# Patient Record
Sex: FEMALE | Race: WHITE | NOT HISPANIC OR LATINO
[De-identification: names, ages, dates, MRNs, and addresses within clinical notes are randomized per-mention and may not be internally consistent; named-entity substitution may affect disease eponyms.]

---

## 2019-04-26 ENCOUNTER — RECORD ABSTRACTING (OUTPATIENT)
Age: 69
End: 2019-04-26

## 2019-04-26 DIAGNOSIS — Z87.820 PERSONAL HISTORY OF TRAUMATIC BRAIN INJURY: ICD-10-CM

## 2019-05-06 ENCOUNTER — APPOINTMENT (OUTPATIENT)
Dept: FAMILY MEDICINE | Facility: CLINIC | Age: 69
End: 2019-05-06
Payer: MEDICARE

## 2019-05-06 VITALS
DIASTOLIC BLOOD PRESSURE: 64 MMHG | SYSTOLIC BLOOD PRESSURE: 122 MMHG | HEART RATE: 94 BPM | HEIGHT: 65 IN | WEIGHT: 135 LBS | RESPIRATION RATE: 14 BRPM | OXYGEN SATURATION: 97 % | BODY MASS INDEX: 22.49 KG/M2

## 2019-05-06 DIAGNOSIS — Z80.42 FAMILY HISTORY OF MALIGNANT NEOPLASM OF PROSTATE: ICD-10-CM

## 2019-05-06 DIAGNOSIS — K44.9 DIAPHRAGMATIC HERNIA W/OUT OBSTRUCTION OR GANGRENE: ICD-10-CM

## 2019-05-06 DIAGNOSIS — K83.5 BILIARY CYST: ICD-10-CM

## 2019-05-06 DIAGNOSIS — S06.0X9S CONCUSSION WITH LOSS OF CONSCIOUSNESS OF UNSPECIFIED DURATION, SEQUELA: ICD-10-CM

## 2019-05-06 DIAGNOSIS — K21.9 GASTRO-ESOPHAGEAL REFLUX DISEASE W/OUT ESOPHAGITIS: ICD-10-CM

## 2019-05-06 LAB — CYTOLOGY CVX/VAG DOC THIN PREP: NORMAL

## 2019-05-06 PROCEDURE — 36415 COLL VENOUS BLD VENIPUNCTURE: CPT

## 2019-05-06 PROCEDURE — 99214 OFFICE O/P EST MOD 30 MIN: CPT | Mod: 25

## 2019-05-06 NOTE — HEALTH RISK ASSESSMENT
[Intercurrent hospitalizations] : was admitted to the hospital  [Any fall with injury in past year] : Patient reported fall with injury in the past year [] : No [de-identified] : TBI [facility] Alize JONES [de-identified] : neurology [de-identified] : no [de-identified] : no [de-identified] : 29/24/018

## 2019-05-06 NOTE — PHYSICAL EXAM
[Well Nourished] : well nourished [Well Developed] : well developed [Normal Sclera/Conjunctiva] : normal sclera/conjunctiva [PERRL] : pupils equal round and reactive to light [EOMI] : extraocular movements intact [Normal Outer Ear/Nose] : the outer ears and nose were normal in appearance [Normal TMs] : both tympanic membranes were normal [Normal Oropharynx] : the oropharynx was normal [No JVD] : no jugular venous distention [No Lymphadenopathy] : no lymphadenopathy [Supple] : supple [Regular Rhythm] : with a regular rhythm [Normal Rate] : normal rate  [Clear to Auscultation] : lungs were clear to auscultation bilaterally [Normal S1, S2] : normal S1 and S2 [de-identified] : poor balance, fatigue

## 2019-05-06 NOTE — HISTORY OF PRESENT ILLNESS
[FreeTextEntry1] : Pt presents for follow up, has not been seen since 2016 [de-identified] : 70 yo wf here for follow up. She moved back to [place] with her son. \par \par I was in icu in  september and I got pulled by my dog and i just hit the ground and  i don’t remember much but there were witnesses. I had TBI. in [place].  I had MRI and CT and she had bleeding on brain and transferred her to [facility] in [facility] They brought in a neurology and said I had MS. They kept saying I had seizures. THey put me on seizure meds and they made me hallucinate. They released me and told me to follow up with neurology. i had blurred vision I could hardly walk and was so off balance. I hit my left side and the right side of my head hurt so much. By tristan I felt better but not 100% .  I get up in the am and have energy and by 11 I could sleep for hours . Also my magnesium was non existing so he put me on magnesium 500 mg every night. I stopped it 3 weeks ago. I got back to [place] 3 weeks ago. Headaches are better but still there. I used to shake so bad but i am better. My balance is still off.

## 2019-05-07 ENCOUNTER — TRANSCRIPTION ENCOUNTER (OUTPATIENT)
Age: 69
End: 2019-05-07

## 2019-05-07 LAB
ALBUMIN SERPL ELPH-MCNC: 4.3 G/DL
ALP BLD-CCNC: 129 U/L
ALT SERPL-CCNC: 17 U/L
ANION GAP SERPL CALC-SCNC: 12 MMOL/L
AST SERPL-CCNC: 22 U/L
BASOPHILS # BLD AUTO: 0.06 K/UL
BASOPHILS NFR BLD AUTO: 0.7 %
BILIRUB SERPL-MCNC: 0.3 MG/DL
BUN SERPL-MCNC: 14 MG/DL
CALCIUM SERPL-MCNC: 9.9 MG/DL
CHLORIDE SERPL-SCNC: 102 MMOL/L
CO2 SERPL-SCNC: 29 MMOL/L
CREAT SERPL-MCNC: 0.75 MG/DL
EOSINOPHIL # BLD AUTO: 0.25 K/UL
EOSINOPHIL NFR BLD AUTO: 2.8 %
ESTIMATED AVERAGE GLUCOSE: 126 MG/DL
GLUCOSE SERPL-MCNC: 111 MG/DL
HBA1C MFR BLD HPLC: 6 %
HCT VFR BLD CALC: 44.7 %
HGB BLD-MCNC: 13.9 G/DL
IMM GRANULOCYTES NFR BLD AUTO: 0.2 %
LYMPHOCYTES # BLD AUTO: 2.65 K/UL
LYMPHOCYTES NFR BLD AUTO: 29.9 %
MAGNESIUM SERPL-MCNC: 1.5 MG/DL
MAN DIFF?: NORMAL
MCHC RBC-ENTMCNC: 30.2 PG
MCHC RBC-ENTMCNC: 31.1 GM/DL
MCV RBC AUTO: 97.2 FL
MONOCYTES # BLD AUTO: 0.64 K/UL
MONOCYTES NFR BLD AUTO: 7.2 %
NEUTROPHILS # BLD AUTO: 5.24 K/UL
NEUTROPHILS NFR BLD AUTO: 59.2 %
PLATELET # BLD AUTO: 262 K/UL
POTASSIUM SERPL-SCNC: 4 MMOL/L
PROT SERPL-MCNC: 6.7 G/DL
RBC # BLD: 4.6 M/UL
RBC # FLD: 13.1 %
SODIUM SERPL-SCNC: 143 MMOL/L
TSH SERPL-ACNC: 2.65 UIU/ML
WBC # FLD AUTO: 8.86 K/UL

## 2019-06-24 DIAGNOSIS — R69 ILLNESS, UNSPECIFIED: ICD-10-CM

## 2019-08-14 ENCOUNTER — RX RENEWAL (OUTPATIENT)
Age: 69
End: 2019-08-14

## 2019-11-05 ENCOUNTER — RX RENEWAL (OUTPATIENT)
Age: 69
End: 2019-11-05

## 2019-11-11 ENCOUNTER — APPOINTMENT (OUTPATIENT)
Dept: FAMILY MEDICINE | Facility: CLINIC | Age: 69
End: 2019-11-11
Payer: MEDICARE

## 2019-11-11 VITALS
BODY MASS INDEX: 20.99 KG/M2 | OXYGEN SATURATION: 97 % | SYSTOLIC BLOOD PRESSURE: 112 MMHG | HEART RATE: 95 BPM | WEIGHT: 126 LBS | DIASTOLIC BLOOD PRESSURE: 70 MMHG | HEIGHT: 65 IN | RESPIRATION RATE: 14 BRPM

## 2019-11-11 DIAGNOSIS — Z12.39 ENCOUNTER FOR OTHER SCREENING FOR MALIGNANT NEOPLASM OF BREAST: ICD-10-CM

## 2019-11-11 PROCEDURE — 36415 COLL VENOUS BLD VENIPUNCTURE: CPT

## 2019-11-11 PROCEDURE — 99214 OFFICE O/P EST MOD 30 MIN: CPT | Mod: 25

## 2019-11-11 NOTE — REVIEW OF SYSTEMS
[Fatigue] : fatigue [Negative] : Respiratory [Recent Change In Weight] : ~T recent weight change [Headache] : headache [Anxiety] : anxiety [FreeTextEntry2] : lost 9 ounds

## 2019-11-11 NOTE — PHYSICAL EXAM
[Well Developed] : well developed [PERRL] : pupils equal round and reactive to light [EOMI] : extraocular movements intact [Normal Sclera/Conjunctiva] : normal sclera/conjunctiva [Normal Oropharynx] : the oropharynx was normal [No JVD] : no jugular venous distention [Normal TMs] : both tympanic membranes were normal [Supple] : supple [Clear to Auscultation] : lungs were clear to auscultation bilaterally [No Lymphadenopathy] : no lymphadenopathy [Regular Rhythm] : with a regular rhythm [Normal Rate] : normal rate  [Normal S1, S2] : normal S1 and S2 [Ill-Appearing] : ill-appearing [de-identified] : migraine facies [de-identified] :  nasal passages erythema and purulent discharge, frontal sinuses are tender bilaterally. postnasal drip. [de-identified] : poor balance, fatigue

## 2019-11-11 NOTE — HISTORY OF PRESENT ILLNESS
[FreeTextEntry1] : Pt presents for follow up,   [de-identified] : 70 yo wf here for follow up. \par Saturday and sunday I could hardly get up my head hurt so bad and I was nauseas too. 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 I am in such a situation that is financially strapped that I will have to move by January I will run out of money. My kids cant help me. i have to nowhere to go. I called the town. They said to call the housing and resource service. They gave me 8 apartment to call. I am so nervous I cant eat I cant sleep. \par \par \par On previous visit she She moved back to [place] with her son. \par \par I was in icu in  september and I got pulled by my dog and i just hit the ground and  i don’t remember much but there were witnesses. I had TBI. in [place].  I had MRI and CT and she had bleeding on brain and transferred [facility] in [facility] They brought in a neurology and said I had MS. They kept saying I had seizures. THey put me on seizure meds and they made me hallucinate. They released me and told me to follow up with neurology. i had blurred vision I could hardly walk and was so off balance. I hit my left side and the right side of my head hurt so much. By tristan I felt better but not 100% .  I get up in the am and have energy and by 11 I could sleep for hours . Also my magnesium was non existing so he put me on magnesium 500 mg every night. I stopped it 3 weeks ago. I got back to [place] 3 weeks ago. Headaches are better but still there. I used to shake so bad but i am better. My balance is still off. \par \par \par she never saw neurology. I had to put my mother in a senior place. I have a problem with my skilled nursing. I had only 1/2 the amount i was expected.

## 2019-11-11 NOTE — HEALTH RISK ASSESSMENT
[Intercurrent hospitalizations] : was admitted to the hospital  [No] : No [Any fall with injury in past year] : Patient reported fall with injury in the past year [] : No [de-identified] : TBI [facility] Alize JONES [de-identified] : no [Audit-CScore] : 0 [de-identified] : neurology [de-identified] : no [de-identified] : 29/24/018

## 2019-11-11 NOTE — ASSESSMENT
[FreeTextEntry1] : Labs drawn/ specimens obtained  in office on this date of service  for evaluation of   assessed conditions -   cbc magnesium    to be run at [facility].\par  \par Take antibiotics,  rest,  increase fluids, wash hands to prevent the spread of  infection . Take mucinex   over the counter. Take tylenol or advil for fever or body aches. Follow up if no better or worse respiratory symptoms.\par \par  will refer to [facility] - case mgmt \par \par We spent sufficient time to discuss aspects of care; questions were answered  to patient's satisfaction.The diagnosis and care plan were discussed with patient in detail.  Patient test results were  reviewed and explained in full. All questions and concerns  were answered to the best of my knowledge.\par

## 2019-11-12 LAB
ALBUMIN SERPL ELPH-MCNC: 4.4 G/DL
ALP BLD-CCNC: 131 U/L
ALT SERPL-CCNC: 16 U/L
ANION GAP SERPL CALC-SCNC: 16 MMOL/L
AST SERPL-CCNC: 21 U/L
BASOPHILS # BLD AUTO: 0.03 K/UL
BASOPHILS NFR BLD AUTO: 0.3 %
BILIRUB SERPL-MCNC: 0.4 MG/DL
BUN SERPL-MCNC: 8 MG/DL
CALCIUM SERPL-MCNC: 10.2 MG/DL
CHLORIDE SERPL-SCNC: 101 MMOL/L
CO2 SERPL-SCNC: 27 MMOL/L
CREAT SERPL-MCNC: 0.74 MG/DL
EOSINOPHIL # BLD AUTO: 0.09 K/UL
EOSINOPHIL NFR BLD AUTO: 1 %
ESTIMATED AVERAGE GLUCOSE: 117 MG/DL
GLUCOSE SERPL-MCNC: 107 MG/DL
HBA1C MFR BLD HPLC: 5.7 %
HCT VFR BLD CALC: 44.8 %
HGB BLD-MCNC: 14.2 G/DL
IMM GRANULOCYTES NFR BLD AUTO: 0.5 %
LYMPHOCYTES # BLD AUTO: 2.5 K/UL
LYMPHOCYTES NFR BLD AUTO: 28.3 %
MAGNESIUM SERPL-MCNC: 1.6 MG/DL
MAN DIFF?: NORMAL
MCHC RBC-ENTMCNC: 30.1 PG
MCHC RBC-ENTMCNC: 31.7 GM/DL
MCV RBC AUTO: 95.1 FL
MONOCYTES # BLD AUTO: 0.62 K/UL
MONOCYTES NFR BLD AUTO: 7 %
NEUTROPHILS # BLD AUTO: 5.54 K/UL
NEUTROPHILS NFR BLD AUTO: 62.9 %
PLATELET # BLD AUTO: 279 K/UL
POTASSIUM SERPL-SCNC: 3.6 MMOL/L
PROT SERPL-MCNC: 6.8 G/DL
RBC # BLD: 4.71 M/UL
RBC # FLD: 12.9 %
SODIUM SERPL-SCNC: 144 MMOL/L
WBC # FLD AUTO: 8.82 K/UL

## 2019-12-23 ENCOUNTER — APPOINTMENT (OUTPATIENT)
Dept: FAMILY MEDICINE | Facility: CLINIC | Age: 69
End: 2019-12-23
Payer: MEDICARE

## 2019-12-23 VITALS
HEIGHT: 65 IN | HEART RATE: 105 BPM | TEMPERATURE: 98.8 F | DIASTOLIC BLOOD PRESSURE: 80 MMHG | SYSTOLIC BLOOD PRESSURE: 132 MMHG | OXYGEN SATURATION: 95 % | WEIGHT: 126 LBS | RESPIRATION RATE: 14 BRPM | BODY MASS INDEX: 20.99 KG/M2

## 2019-12-23 PROCEDURE — 99213 OFFICE O/P EST LOW 20 MIN: CPT

## 2019-12-23 RX ORDER — NEBULIZER ACCESSORIES
KIT MISCELLANEOUS
Qty: 1 | Refills: 0 | Status: ACTIVE | OUTPATIENT
Start: 2019-12-23

## 2019-12-23 NOTE — HEALTH RISK ASSESSMENT
[No] : In the past 12 months have you used drugs other than those required for medical reasons? No [No falls in past year] : Patient reported no falls in the past year [de-identified] : no [] : No [de-identified] : no

## 2019-12-23 NOTE — HEALTH RISK ASSESSMENT
[No] : In the past 12 months have you used drugs other than those required for medical reasons? No [No falls in past year] : Patient reported no falls in the past year [] : No [de-identified] : no [de-identified] : no

## 2019-12-23 NOTE — PHYSICAL EXAM
[Ill-Appearing] : ill-appearing [Normal] : no jugular venous distention, supple, no lymphadenopathy and the thyroid was normal and there were no nodules present [Barky Cough] : a barky cough was heard [Scattered Wheezes] : scattered wheezing was heard [Rales / Crackles Left] : no rales or crackles were heard over the left lung [Rhonchi Bilateral] : rhonchi were heard diffusely over both lungs [Tachycardia] : tachycardic [Normal S1] : normal S1 [Normal S2] : normal S2 [de-identified] : coughing incessantly

## 2019-12-23 NOTE — REVIEW OF SYSTEMS
[Chills] : chills [Fatigue] : fatigue [Shortness Of Breath] : shortness of breath [Palpitations] : palpitations [Wheezing] : wheezing [Cough] : cough [Negative] : ENT

## 2019-12-23 NOTE — ASSESSMENT
[FreeTextEntry1] : REFER PATIENT TO ER  FOR FURTHER EVALUATION RO PNEUMONIA, HYPOXIA, BACK PAIN, SHORTNESS OF BREATH.\par PATIENT AGREES WITH PLAN

## 2019-12-23 NOTE — PHYSICAL EXAM
[Ill-Appearing] : ill-appearing [Normal] : no jugular venous distention, supple, no lymphadenopathy and the thyroid was normal and there were no nodules present [Barky Cough] : a barky cough was heard [Scattered Wheezes] : scattered wheezing was heard [Rales / Crackles Left] : no rales or crackles were heard over the left lung [Rhonchi Bilateral] : rhonchi were heard diffusely over both lungs [Tachycardia] : tachycardic [Normal S1] : normal S1 [Normal S2] : normal S2 [de-identified] : coughing incessantly

## 2019-12-23 NOTE — HISTORY OF PRESENT ILLNESS
[FreeTextEntry8] : pt c/o -ST, +Cough with green phlegm, +HA, +ear pain, +sinus pressure, +congestion, +temp, +SOB, +wheezing  X since thursday,.  My heart is beating fast for 2 days. I have back pain in the right upper yesterday. I cant breath I cant catch your breath\par

## 2019-12-26 ENCOUNTER — MEDICATION RENEWAL (OUTPATIENT)
Age: 69
End: 2019-12-26

## 2019-12-26 RX ORDER — LEVALBUTEROL HYDROCHLORIDE 0.63 MG/3ML
0.63 SOLUTION RESPIRATORY (INHALATION)
Refills: 0 | Status: COMPLETED | OUTPATIENT
Start: 2019-12-26

## 2019-12-26 RX ORDER — SOFT LENS DISINFECTANT
SOLUTION, NON-ORAL MISCELLANEOUS
Qty: 1 | Refills: 0 | Status: ACTIVE | COMMUNITY
Start: 2019-12-23 | End: 1900-01-01

## 2019-12-27 ENCOUNTER — MEDICATION RENEWAL (OUTPATIENT)
Age: 69
End: 2019-12-27

## 2020-01-02 ENCOUNTER — RX RENEWAL (OUTPATIENT)
Age: 70
End: 2020-01-02

## 2020-01-31 ENCOUNTER — RX RENEWAL (OUTPATIENT)
Age: 70
End: 2020-01-31

## 2020-04-11 ENCOUNTER — APPOINTMENT (OUTPATIENT)
Dept: FAMILY MEDICINE | Facility: CLINIC | Age: 70
End: 2020-04-11
Payer: MEDICARE

## 2020-04-11 PROCEDURE — G2012 BRIEF CHECK IN BY MD/QHP: CPT

## 2020-04-13 ENCOUNTER — RX RENEWAL (OUTPATIENT)
Age: 70
End: 2020-04-13

## 2020-04-13 RX ORDER — METHYLPREDNISOLONE 4 MG/1
4 TABLET ORAL
Qty: 21 | Refills: 0 | Status: COMPLETED | COMMUNITY
Start: 2019-11-11 | End: 2020-04-13

## 2020-04-24 ENCOUNTER — NON-APPOINTMENT (OUTPATIENT)
Age: 70
End: 2020-04-24

## 2020-04-24 ENCOUNTER — RX RENEWAL (OUTPATIENT)
Age: 70
End: 2020-04-24

## 2020-04-24 ENCOUNTER — APPOINTMENT (OUTPATIENT)
Dept: FAMILY MEDICINE | Facility: CLINIC | Age: 70
End: 2020-04-24
Payer: MEDICARE

## 2020-04-24 ENCOUNTER — TRANSCRIPTION ENCOUNTER (OUTPATIENT)
Age: 70
End: 2020-04-24

## 2020-04-24 VITALS — HEIGHT: 65 IN | SYSTOLIC BLOOD PRESSURE: 160 MMHG | DIASTOLIC BLOOD PRESSURE: 100 MMHG | RESPIRATION RATE: 14 BRPM

## 2020-04-24 VITALS
DIASTOLIC BLOOD PRESSURE: 90 MMHG | HEART RATE: 90 BPM | SYSTOLIC BLOOD PRESSURE: 160 MMHG | OXYGEN SATURATION: 97 % | RESPIRATION RATE: 20 BRPM

## 2020-04-24 DIAGNOSIS — J18.0 BRONCHOPNEUMONIA, UNSPECIFIED ORGANISM: ICD-10-CM

## 2020-04-24 PROCEDURE — 99214 OFFICE O/P EST MOD 30 MIN: CPT

## 2020-04-24 RX ORDER — OMEPRAZOLE 20 MG/1
20 CAPSULE, DELAYED RELEASE ORAL
Qty: 90 | Refills: 0 | Status: COMPLETED | COMMUNITY
Start: 2019-05-06

## 2020-04-24 RX ORDER — ALBUTEROL SULFATE 90 UG/1
108 (90 BASE) AEROSOL, METERED RESPIRATORY (INHALATION)
Qty: 18 | Refills: 0 | Status: COMPLETED | COMMUNITY
Start: 2019-12-23

## 2020-04-24 RX ORDER — PREDNISONE 20 MG/1
20 TABLET ORAL
Qty: 8 | Refills: 0 | Status: COMPLETED | COMMUNITY
Start: 2019-12-23

## 2020-04-24 NOTE — PHYSICAL EXAM
[Tremulous] : was tremulous [Normal] : no respiratory distress, lungs were clear to auscultation bilaterally and no accessory muscle use [Anxious] : anxious

## 2020-04-24 NOTE — HISTORY OF PRESENT ILLNESS
[FreeTextEntry8] : patient c/o high blood pressure\par \par I am renting  a  basement  apartment of a home . I have been there for three weeks. Since I was there it has been so noisy. The kids are noisy and the furniture is dragging on the floor. The kids ride their bikes in the house. I talked to the landlord and she said she would try to be more quiet . But it was not any better. I get up in the am and leave and sit in my car all day and then go in and the noise is unbearable. They wont let me end my lease. My bp is fine in the car. When I go in the house it goes up because I cant take the noise level. I cant even eat because of the noise.  SHe takes lisinopril 20 mg qd but then takes another when her bp is high and she will take xanax occasssionally\par I have another place set up . I am moving out.  I have to . I get such high blood pressure. I want to get checked. she denies cp . she recovered from the bronchitis in [facility] by mid January.\par

## 2020-04-24 NOTE — ASSESSMENT
[FreeTextEntry1] : EKG performed on this day in the office and reviewed by PHI Glover. \par follow up with cardiology for abnormal ekg. \par  take lisinopril 20 mg bid and add metoprolol er 50 mg qd for anxiety and bp effect. ( she did not tolerate norvasc in the past) \par Activity as tolerated\par Go to ER if worse chest pain or shortness of breath.\par  As per usual protocol the patient was advised in regards to the risks of driving when on medications with side effects of dizziness or drowsiness. Patient has been assessed  for increase risk for respiratory depression. Patient denies suicidal ideations or plan.  Istop checked.\par

## 2020-05-07 ENCOUNTER — APPOINTMENT (OUTPATIENT)
Dept: CARDIOLOGY | Facility: CLINIC | Age: 70
End: 2020-05-07
Payer: MEDICARE

## 2020-05-07 VITALS
HEART RATE: 94 BPM | WEIGHT: 129 LBS | DIASTOLIC BLOOD PRESSURE: 96 MMHG | OXYGEN SATURATION: 99 % | SYSTOLIC BLOOD PRESSURE: 150 MMHG | HEIGHT: 65 IN | BODY MASS INDEX: 21.49 KG/M2

## 2020-05-07 DIAGNOSIS — Z87.891 PERSONAL HISTORY OF NICOTINE DEPENDENCE: ICD-10-CM

## 2020-05-07 DIAGNOSIS — R79.0 ABNORMAL LVL OF BLOOD MINERAL: ICD-10-CM

## 2020-05-07 DIAGNOSIS — Z78.9 OTHER SPECIFIED HEALTH STATUS: ICD-10-CM

## 2020-05-07 PROCEDURE — 99204 OFFICE O/P NEW MOD 45 MIN: CPT

## 2020-05-07 NOTE — REVIEW OF SYSTEMS
[Shortness Of Breath] : shortness of breath [Dyspnea on exertion] : dyspnea during exertion [Chest Pain] : no chest pain [Palpitations] : no palpitations [Lower Ext Edema] : no extremity edema [Cough] : no cough [Wheezing] : no wheezing [Joint Pain] : joint pain [Joint Stiffness] : joint stiffness [Negative] : Heme/Lymph

## 2020-05-07 NOTE — PHYSICAL EXAM
[General Appearance - Well Developed] : well developed [Normal Appearance] : normal appearance [Well Groomed] : well groomed [General Appearance - Well Nourished] : well nourished [General Appearance - In No Acute Distress] : no acute distress [Eyelids - No Xanthelasma] : the eyelids demonstrated no xanthelasmas [Normal Conjunctiva] : the conjunctiva exhibited no abnormalities [Normal Oral Mucosa] : normal oral mucosa [No Oral Pallor] : no oral pallor [No Oral Cyanosis] : no oral cyanosis [Heart Rate And Rhythm] : heart rate and rhythm were normal [Edema] : no peripheral edema present [Heart Sounds] : normal S1 and S2 [FreeTextEntry1] : 1/6 systolic ejection murmur RUSB [Respiration, Rhythm And Depth] : normal respiratory rhythm and effort [Exaggerated Use Of Accessory Muscles For Inspiration] : no accessory muscle use [Auscultation Breath Sounds / Voice Sounds] : lungs were clear to auscultation bilaterally [Abnormal Walk] : normal gait [Gait - Sufficient For Exercise Testing] : the gait was sufficient for exercise testing [Cyanosis, Localized] : no localized cyanosis [Nail Clubbing] : no clubbing of the fingernails [Skin Color & Pigmentation] : normal skin color and pigmentation [Impaired Insight] : insight and judgment were intact [Skin Turgor] : normal skin turgor [] : no rash [Affect] : the affect was normal [Memory Recent] : recent memory was not impaired

## 2020-05-07 NOTE — DISCUSSION/SUMMARY
[FreeTextEntry1] : 1. Dyspnea: patient with risk factors CAD, which include uncontrolled HTN, HLD, post menopausal. My recommendation is to perform pharmacologic nuclear stress testing and echocardiogram. \par \par 2. Uncontrolled HTN: Recommend d/c of Toprol XL 50mg daily. I am recommending to start HCTZ 25mg daily and convert her Lisinopril 20mg BID to 40mg daily for better medication compliance. 1 week after starting HCTZ I recommend she get a BMP and magnesium level done. Patient has a history of hypomagnesemia, probably from long term use of omeprazole. I gave her magnesium oxide 400mg daily. If low magnesium, potassium, or sodium become an issue on HCTZ, I will switch to another agent. Goal BP is 130/80 or less. Recommend low salt diet. \par \par 3. Hyperlipidemia: recommend continuing atorvastatin 20mg daily. \par \par Follow up after testing to discuss result and follow up on her BP.

## 2020-05-07 NOTE — HISTORY OF PRESENT ILLNESS
[FreeTextEntry1] : 70 year old female with history of benign lung tumor s/p resection many years ago, HTN, HLD presents for evaluation of her hypertension and dyspnea. Patient states that her dyspnea has been present for 1 year. Over the past 1-2 months it is worsening in severity. Patient gets moderate SOB with ambulating up 1 flight of stairs. She has no associated chest pain or palpitations. As far as her HTN is concerned she had her Lisinopril increased from 20mg daily to BID dosing by her PCP about two weeks ago. She was also placed on Toprol XL 50mg daily. She states she was living in a basement apartment and it was causing her stress because the children who lived upstairs were extremely noisy. She moved out of the apartment and her BP has came down a little but on her home monitor she still gets BPs in the 140s-150s systolic.

## 2020-05-10 ENCOUNTER — NON-APPOINTMENT (OUTPATIENT)
Age: 70
End: 2020-05-10

## 2020-06-18 ENCOUNTER — APPOINTMENT (OUTPATIENT)
Dept: CARDIOLOGY | Facility: CLINIC | Age: 70
End: 2020-06-18

## 2020-06-24 ENCOUNTER — APPOINTMENT (OUTPATIENT)
Dept: CARDIOLOGY | Facility: CLINIC | Age: 70
End: 2020-06-24

## 2020-08-08 ENCOUNTER — RX RENEWAL (OUTPATIENT)
Age: 70
End: 2020-08-08

## 2020-08-19 ENCOUNTER — APPOINTMENT (OUTPATIENT)
Dept: FAMILY MEDICINE | Facility: CLINIC | Age: 70
End: 2020-08-19
Payer: MEDICARE

## 2020-08-19 VITALS — TEMPERATURE: 97.7 F

## 2020-08-19 VITALS
HEIGHT: 65 IN | HEART RATE: 94 BPM | DIASTOLIC BLOOD PRESSURE: 70 MMHG | TEMPERATURE: 97.7 F | SYSTOLIC BLOOD PRESSURE: 110 MMHG | WEIGHT: 114 LBS | BODY MASS INDEX: 18.99 KG/M2 | RESPIRATION RATE: 14 BRPM | OXYGEN SATURATION: 98 %

## 2020-08-19 PROCEDURE — 99495 TRANSJ CARE MGMT MOD F2F 14D: CPT | Mod: 25

## 2020-08-19 PROCEDURE — 36415 COLL VENOUS BLD VENIPUNCTURE: CPT

## 2020-08-19 RX ORDER — DULOXETINE HYDROCHLORIDE 30 MG/1
30 CAPSULE, DELAYED RELEASE PELLETS ORAL DAILY
Qty: 270 | Refills: 0 | Status: DISCONTINUED | COMMUNITY
Start: 2019-08-14 | End: 2020-08-19

## 2020-08-19 RX ORDER — HYDROCHLOROTHIAZIDE 25 MG/1
25 TABLET ORAL DAILY
Qty: 90 | Refills: 3 | Status: DISCONTINUED | COMMUNITY
Start: 2020-05-07 | End: 2020-08-19

## 2020-08-19 RX ORDER — ALPRAZOLAM 0.25 MG/1
0.25 TABLET ORAL
Qty: 60 | Refills: 0 | Status: DISCONTINUED | COMMUNITY
Start: 2019-11-11 | End: 2020-08-19

## 2020-08-19 RX ORDER — LISINOPRIL 20 MG/1
20 TABLET ORAL
Refills: 0 | Status: DISCONTINUED | COMMUNITY
Start: 1900-01-01 | End: 2020-08-19

## 2020-08-19 RX ORDER — LISINOPRIL 40 MG/1
40 TABLET ORAL DAILY
Qty: 90 | Refills: 3 | Status: DISCONTINUED | COMMUNITY
Start: 2020-05-07 | End: 2020-08-19

## 2020-08-19 NOTE — HEALTH RISK ASSESSMENT
[No] : In the past 12 months have you used drugs other than those required for medical reasons? No [Patient reported mammogram was normal] : Patient reported mammogram was normal [Patient declined colonoscopy] : Patient declined colonoscopy [HIV test declined] : HIV test declined [Hepatitis C test declined] : Hepatitis C test declined [Financial] : financial [Alone] : lives alone [Retired] : retired [High School] : high school [Single] : single [Fully functional (bathing, dressing, toileting, transferring, walking, feeding)] : Fully functional (bathing, dressing, toileting, transferring, walking, feeding) [Fully functional (using the telephone, shopping, preparing meals, housekeeping, doing laundry, using] : Fully functional and needs no help or supervision to perform IADLs (using the telephone, shopping, preparing meals, housekeeping, doing laundry, using transportation, managing medications and managing finances) [Feels Safe at Home] : Feels safe at home [Reports normal functional visual acuity (ie: able to read med bottle)] : Reports normal functional visual acuity [Carbon Monoxide Detector] : carbon monoxide detector [Smoke Detector] : smoke detector [Seat Belt] :  uses seat belt [Safety elements used in home] : safety elements used in home [Sunscreen] : uses sunscreen [Intercurrent hospitalizations] : was admitted to the hospital  [] : No [de-identified] : hepatectomy [de-identified] : surgeon, cardiology  Dr Bae [Audit-CScore] : 0 [de-identified] : PT [de-identified] : no [Change in mental status noted] : No change in mental status noted [Language] : denies difficulty with language [Behavior] : denies difficulty with behavior [Learning/Retaining New Information] : denies difficulty learning/retaining new information [Reasoning] : denies difficulty with reasoning [Spatial Ability and Orientation] : denies difficulty with spatial ability and orientation [Sexually Active] : not sexually active [Reports changes in hearing] : Reports no changes in hearing [Reports changes in vision] : Reports no changes in vision [Reports changes in dental health] : Reports no changes in dental health [Guns at Home] : no guns at home [Travel to Developing Areas] : does not  travel to developing areas [TB Exposure] : is not being exposed to tuberculosis [Caregiver Concerns] : does not have caregiver concerns [MammogramDate] : 11/19 [PapSmearDate] : 6/17 [BoneDensityDate] : 9/18

## 2020-08-19 NOTE — ASSESSMENT
[FreeTextEntry1] : ear irrigated  . ear looks ok. hx of cdiff. I don’t want to do any empiric antibiotics\par \par Labs drawn/ specimens obtained  in office on this date of service  for evaluation of   assessed conditions - sweating electrolytes  tft  mg  to be run at [facility].\par increase metoprolol 100 mg er qd ( she will take two of her 50 mg at once in am)\par dc lisinopril since we are increasing metoprolol\par monitor headache pounding heart and bp and pulse.\par she has headaches in the past but she is only experiencing ha in evening when she lays down.\par scar healing well. \par improve diet with nutrition fruit and chicken- no vegetables yet\par \par \par

## 2020-08-19 NOTE — PHYSICAL EXAM
[Normal] : the outer ears and nose were normal in appearance and the oropharynx was normal [Tachycardia] : tachycardic [Normal S1] : normal S1 [Normal S2] : normal S2 [de-identified] : weak

## 2020-08-19 NOTE — HISTORY OF PRESENT ILLNESS
[Admitted on: ___] : The patient was admitted on [unfilled] [Discharged on ___] : discharged on [unfilled] [FreeTextEntry2] : pt presents for a hospital f/u on cyst on liver, stomach, and kidney \par She had a Had a hepatic cyst it was removed. by Dr Fernández. \par She has excessive night sweats. she is drenched  she changes all her sheets. \par she has bad headaches when she lays down. \par her right ear is clogged. \par she has her heart beating loud in her neck and ear.\par She cant sleep  \par she was discharged on 8/5/20\par

## 2020-08-19 NOTE — REVIEW OF SYSTEMS
[Recent Change In Weight] : ~T recent weight change [Abdominal Pain] : abdominal pain [Negative] : Respiratory [Night Sweats] : night sweats [Earache] : earache [Headache] : headache [FreeTextEntry7] : surgical incision

## 2020-08-20 ENCOUNTER — TRANSCRIPTION ENCOUNTER (OUTPATIENT)
Age: 70
End: 2020-08-20

## 2020-08-20 LAB
ALBUMIN SERPL ELPH-MCNC: 4.1 G/DL
ALP BLD-CCNC: 176 U/L
ALT SERPL-CCNC: 20 U/L
ANION GAP SERPL CALC-SCNC: 17 MMOL/L
AST SERPL-CCNC: 32 U/L
BASOPHILS # BLD AUTO: 0.06 K/UL
BASOPHILS NFR BLD AUTO: 0.6 %
BILIRUB SERPL-MCNC: 0.2 MG/DL
BUN SERPL-MCNC: 14 MG/DL
CALCIUM SERPL-MCNC: 10.1 MG/DL
CHLORIDE SERPL-SCNC: 101 MMOL/L
CO2 SERPL-SCNC: 25 MMOL/L
CREAT SERPL-MCNC: 0.71 MG/DL
EOSINOPHIL # BLD AUTO: 0.14 K/UL
EOSINOPHIL NFR BLD AUTO: 1.5 %
ERYTHROCYTE [SEDIMENTATION RATE] IN BLOOD BY WESTERGREN METHOD: 17 MM/HR
FOLATE SERPL-MCNC: >20 NG/ML
GLUCOSE SERPL-MCNC: 98 MG/DL
HCT VFR BLD CALC: 38.8 %
HGB BLD-MCNC: 11.6 G/DL
IMM GRANULOCYTES NFR BLD AUTO: 0.3 %
IRON SATN MFR SERPL: 10 %
IRON SERPL-MCNC: 23 UG/DL
LYMPHOCYTES # BLD AUTO: 2.25 K/UL
LYMPHOCYTES NFR BLD AUTO: 24 %
MAN DIFF?: NORMAL
MCHC RBC-ENTMCNC: 29.9 GM/DL
MCHC RBC-ENTMCNC: 30.3 PG
MCV RBC AUTO: 101.3 FL
MONOCYTES # BLD AUTO: 0.91 K/UL
MONOCYTES NFR BLD AUTO: 9.7 %
NEUTROPHILS # BLD AUTO: 5.97 K/UL
NEUTROPHILS NFR BLD AUTO: 63.9 %
PLATELET # BLD AUTO: 522 K/UL
POTASSIUM SERPL-SCNC: 5.1 MMOL/L
PROT SERPL-MCNC: 7.2 G/DL
RBC # BLD: 3.83 M/UL
RBC # FLD: 14.6 %
SODIUM SERPL-SCNC: 143 MMOL/L
T3 SERPL-MCNC: 119 NG/DL
T3FREE SERPL-MCNC: 3.02 PG/ML
T4 FREE SERPL-MCNC: 1.4 NG/DL
TIBC SERPL-MCNC: 242 UG/DL
TSH SERPL-ACNC: 3.24 UIU/ML
UIBC SERPL-MCNC: 218 UG/DL
VIT B12 SERPL-MCNC: 713 PG/ML
WBC # FLD AUTO: 9.36 K/UL

## 2020-08-24 LAB
M TB IFN-G BLD-IMP: ABNORMAL
QUANTIFERON TB PLUS MITOGEN MINUS NIL: 0.12 IU/ML
QUANTIFERON TB PLUS NIL: 0.01 IU/ML
QUANTIFERON TB PLUS TB1 MINUS NIL: 0 IU/ML
QUANTIFERON TB PLUS TB2 MINUS NIL: 0 IU/ML

## 2020-08-27 ENCOUNTER — APPOINTMENT (OUTPATIENT)
Dept: FAMILY MEDICINE | Facility: CLINIC | Age: 70
End: 2020-08-27

## 2020-08-31 ENCOUNTER — APPOINTMENT (OUTPATIENT)
Dept: FAMILY MEDICINE | Facility: CLINIC | Age: 70
End: 2020-08-31
Payer: MEDICARE

## 2020-08-31 VITALS
OXYGEN SATURATION: 95 % | TEMPERATURE: 97.7 F | DIASTOLIC BLOOD PRESSURE: 70 MMHG | BODY MASS INDEX: 20.38 KG/M2 | RESPIRATION RATE: 14 BRPM | SYSTOLIC BLOOD PRESSURE: 112 MMHG | HEART RATE: 82 BPM | HEIGHT: 63 IN | WEIGHT: 115 LBS

## 2020-08-31 DIAGNOSIS — Z09 ENCOUNTER FOR FOLLOW-UP EXAMINATION AFTER COMPLETED TREATMENT FOR CONDITIONS OTHER THAN MALIGNANT NEOPLASM: ICD-10-CM

## 2020-08-31 PROCEDURE — 99213 OFFICE O/P EST LOW 20 MIN: CPT

## 2020-08-31 NOTE — ASSESSMENT
[FreeTextEntry1] : pt had keppra in the past and it made her hallucinate. \par I wanted to try gabapenting for headache or topamax for headache but pt was uneasy about it\par trial cyclobenzaprine for headache\par if no relief follow up neurology\par her bp is improved on metoprolol. \par We spent sufficient time to discuss aspects of care; questions were answered  to patient's satisfaction.The diagnosis and care plan were discussed with patient in detail.  Patient test results were  reviewed and explained in full. All questions and concerns  were answered to the best of my knowledge.\par

## 2020-08-31 NOTE — PHYSICAL EXAM
[Normal] : normal rate, regular rhythm, normal S1 and S2 and no murmur heard [de-identified] : right occipital pain and tension radiating up to top of head [de-identified] : scar, skin hangs over scar. it is not swollen.

## 2020-08-31 NOTE — HISTORY OF PRESENT ILLNESS
[FreeTextEntry8] : 71 yo wf here for folllow up on headaches. she has the headache mainly at night w nausea . she has changed her bp medications since last office visit.\par \par she takes her medications at 830 am. Her bp  is high in the am but the pulse is better.\par she stopped the magnesium\par \par this headache starts in the right occiput and it radiates to the mehreen of her head and it is only at night when she lays down .  last night was the absolute worse. she was awake midnight to 5 am . she cannot sleep. I had 2 mri of my brain and they were good. \par she did not try xanax\par she is sick to her stomach. \par I am still swollen in the abdomen I am curious if it is ok.

## 2020-09-02 ENCOUNTER — APPOINTMENT (OUTPATIENT)
Dept: FAMILY MEDICINE | Facility: CLINIC | Age: 70
End: 2020-09-02
Payer: MEDICARE

## 2020-09-02 VITALS — TEMPERATURE: 97.9 F

## 2020-09-02 PROCEDURE — 36415 COLL VENOUS BLD VENIPUNCTURE: CPT

## 2020-09-04 LAB
M TB IFN-G BLD-IMP: ABNORMAL
QUANTIFERON TB PLUS MITOGEN MINUS NIL: 0.49 IU/ML
QUANTIFERON TB PLUS NIL: 0.02 IU/ML
QUANTIFERON TB PLUS TB1 MINUS NIL: 0 IU/ML
QUANTIFERON TB PLUS TB2 MINUS NIL: 0 IU/ML

## 2020-09-09 ENCOUNTER — RX RENEWAL (OUTPATIENT)
Age: 70
End: 2020-09-09

## 2020-09-11 ENCOUNTER — NON-APPOINTMENT (OUTPATIENT)
Age: 70
End: 2020-09-11

## 2020-09-15 ENCOUNTER — TRANSCRIPTION ENCOUNTER (OUTPATIENT)
Age: 70
End: 2020-09-15

## 2020-09-16 ENCOUNTER — TRANSCRIPTION ENCOUNTER (OUTPATIENT)
Age: 70
End: 2020-09-16

## 2020-09-27 ENCOUNTER — RX RENEWAL (OUTPATIENT)
Age: 70
End: 2020-09-27

## 2020-10-16 ENCOUNTER — APPOINTMENT (OUTPATIENT)
Dept: FAMILY MEDICINE | Facility: CLINIC | Age: 70
End: 2020-10-16
Payer: MEDICARE

## 2020-10-16 VITALS
SYSTOLIC BLOOD PRESSURE: 110 MMHG | BODY MASS INDEX: 20.91 KG/M2 | RESPIRATION RATE: 14 BRPM | TEMPERATURE: 97.6 F | WEIGHT: 118 LBS | HEIGHT: 63 IN | HEART RATE: 50 BPM | DIASTOLIC BLOOD PRESSURE: 80 MMHG | OXYGEN SATURATION: 98 %

## 2020-10-16 DIAGNOSIS — R06.02 SHORTNESS OF BREATH: ICD-10-CM

## 2020-10-16 DIAGNOSIS — Z13.820 ENCOUNTER FOR SCREENING FOR OSTEOPOROSIS: ICD-10-CM

## 2020-10-16 DIAGNOSIS — Z11.1 ENCOUNTER FOR SCREENING FOR RESPIRATORY TUBERCULOSIS: ICD-10-CM

## 2020-10-16 DIAGNOSIS — R76.12 NONSPECIFIC REACTION TO CELL MEDIATED IMMUNITY MEASUREMENT OF GAMMA INTERFERON ANTIGEN RESPONSE W/OUT ACTIVE TUBERCULOSIS: ICD-10-CM

## 2020-10-16 DIAGNOSIS — R00.2 PALPITATIONS: ICD-10-CM

## 2020-10-16 DIAGNOSIS — Z86.79 PERSONAL HISTORY OF OTHER DISEASES OF THE CIRCULATORY SYSTEM: ICD-10-CM

## 2020-10-16 DIAGNOSIS — Z86.69 PERSONAL HISTORY OF OTHER DISEASES OF THE NERVOUS SYSTEM AND SENSE ORGANS: ICD-10-CM

## 2020-10-16 DIAGNOSIS — Z00.00 ENCOUNTER FOR GENERAL ADULT MEDICAL EXAMINATION W/OUT ABNORMAL FINDINGS: ICD-10-CM

## 2020-10-16 PROCEDURE — G0439: CPT

## 2020-10-16 PROCEDURE — 36415 COLL VENOUS BLD VENIPUNCTURE: CPT

## 2020-10-16 PROCEDURE — G0008: CPT

## 2020-10-16 PROCEDURE — 90662 IIV NO PRSV INCREASED AG IM: CPT

## 2020-10-16 NOTE — HISTORY OF PRESENT ILLNESS
[FreeTextEntry1] : medicare wellness [de-identified] : medicare wellness\par \par co confusion, headache dizzyness\par \par co left shoulder pain 4 m going to chiropractor for 6 weeks it is not getting better

## 2020-10-16 NOTE — REVIEW OF SYSTEMS
[Recent Change In Weight] : ~T recent weight change [Joint Pain] : joint pain [Headache] : headache [Negative] : Integumentary [Fatigue] : fatigue [Vision Problems] : vision problems [Heartburn] : heartburn [Dizziness] : dizziness [Memory Loss] : memory loss [Confusion] : confusion [Night Sweats] : no night sweats [Nausea] : no nausea [Abdominal Pain] : no abdominal pain [Constipation] : no constipation [Diarrhea] : diarrhea [Vomiting] : no vomiting [Melena] : no melena [Anxiety] : no anxiety [FreeTextEntry7] : one little surgical incision spot [FreeTextEntry9] : left shoulder blade into the arm for a while

## 2020-10-16 NOTE — PHYSICAL EXAM
[Bradycardia] : bradycardic [Normal S1] : normal S1 [Normal S2] : normal S2 [de-identified] : left shoulder pain very decreased rom and rotator cuff signs. severe pain weakness  [de-identified] : scar  painful right upper and right lower quad [Normal] : affect was normal and insight and judgment were intact [de-identified] : headache memory issues not noticeable right know

## 2020-10-16 NOTE — ASSESSMENT
[FreeTextEntry1] : Basic cardiovascular prevention measures are advised including regular exercise, surveillance medical examination, and prudent portion-controlled low fat diet, rich in a variety of vegetables with minimal added sugars, refined starches, and no artificially hydrogenated oils. Hypertension is a common condition that can lead to serious complication if untreated. Making dietary changes and losing weight are effective treatments for reducing blood pressure.  Other lifestyle changes that can help reduce blood pressure include stopping smoking, reducing stress, reducing alcohol consumption and exercising regularly.  These changes are effective when used alone, but often have the greatest benefit when used together.  Making changes in your diet like reducing sodium, the main source of sodium in the diet is the salt contained in packaged and processed foods and in foods from restaurants. People who have more than two drinks per day have an increased risk of high blood pressure compared to non drinkers. Eating more fruits and vegetables and low fat dairy products may also lower blood pressure.\par \par bradycardia noted it is due to the betablocker. she feels better no pounding heart no sweats no tension headache\par pt has cardiology\par last ekg 5/20 was good\par check  xrays left shoulder ro fx dislocation.. then  mri left shoulder ro tear . try to do light stretching heat /ice conservative therapy\par follow up surgeon she still has right mid and lower quad pain\par folllow up neuro for ha, dizzy, hx of tbi concussion\par follow up eye doctor\par  Information regarding flu shot reviewed. All questions answered.Flu shot .7 cc IM r     deltoid . No reaction noted. Advised patients to wash hands to reduce the spread of infection.\par Labs drawn/ specimens obtained  in office on this date of service  for evaluation of   assessed conditions -    physical  to be run at [facility].\par does not need renewals\par mammogram\par bone density\par  \par \par We spent sufficient time to discuss aspects of care; questions were answered  to patient's satisfaction.The diagnosis and care plan were discussed with patient in detail.  Patient test results were  reviewed and explained in full. All questions and concerns  were answered to the best of my knowledge.\par

## 2020-10-16 NOTE — HEALTH RISK ASSESSMENT
[Never (0 pts)] : Never (0 points) [Intercurrent hospitalizations] : was admitted to the hospital  [No falls in past year] : Patient reported no falls in the past year [Patient reported colonoscopy was normal] : Patient reported colonoscopy was normal [HIV test declined] : HIV test declined [Hepatitis C test declined] : Hepatitis C test declined [None] : None [Alone] : lives alone [# of Members in Household ___] :  household currently consist of [unfilled] member(s) [Smoke Detector] : smoke detector [Carbon Monoxide Detector] : carbon monoxide detector [Seat Belt] :  uses seat belt [Sunscreen] : uses sunscreen [Safety elements used in home] : safety elements used in home [Designated Healthcare Proxy] : Designated healthcare proxy [Relationship: ___] : Relationship: [unfilled] [Name: ___] : Health Care Proxy's Name: [unfilled]  [Aggressive treatment] : aggressive treatment [Fair] :  ~his/her~ mood as fair [No] : In the past 12 months have you used drugs other than those required for medical reasons? No [No Retinopathy] : No retinopathy [Patient reported PAP Smear was normal] : Patient reported PAP Smear was normal [] :  [# Of Children ___] : has [unfilled] children [Fully functional (bathing, dressing, toileting, transferring, walking, feeding)] : Fully functional (bathing, dressing, toileting, transferring, walking, feeding) [Feels Safe at Home] : Feels safe at home [Fully functional (using the telephone, shopping, preparing meals, housekeeping, doing laundry, using] : Fully functional and needs no help or supervision to perform IADLs (using the telephone, shopping, preparing meals, housekeeping, doing laundry, using transportation, managing medications and managing finances) [Reports changes in vision] : Reports changes in vision [FreeTextEntry1] : medicare [] : No [de-identified] : fair [de-identified] : no [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  [de-identified] :  removal of liver cyst [LowDoseCTScan] : 7/1/2020 [EyeExamDate] : 2020 [Language] : denies difficulty with language [Change in mental status noted] : No change in mental status noted [Behavior] : denies difficulty with behavior [Handling Complex Tasks] : denies difficulty handling complex tasks [Learning/Retaining New Information] : denies difficulty learning/retaining new information [Spatial Ability and Orientation] : denies difficulty with spatial ability and orientation [Sexually Active] : not sexually active [Reasoning] : denies difficulty with reasoning [Reports changes in hearing] : Reports no changes in hearing [Reports changes in dental health] : Reports no changes in dental health [Guns at Home] : no guns at home [TB Exposure] : is not being exposed to tuberculosis [Caregiver Concerns] : does not have caregiver concerns [MammogramDate] : 10/2020 [MammogramComments] : ordered [BoneDensityDate] : 2020 [PapSmearDate] : 2019 [ColonoscopyDate] : 2012 [BoneDensityComments] : ordered [de-identified] : blurry [AdvancecareDate] : 10/16/20 [FreeTextEntry4] : [id number]

## 2020-10-18 ENCOUNTER — TRANSCRIPTION ENCOUNTER (OUTPATIENT)
Age: 70
End: 2020-10-18

## 2020-10-18 LAB
ALBUMIN SERPL ELPH-MCNC: 4.3 G/DL
ALP BLD-CCNC: 129 U/L
ALT SERPL-CCNC: 16 U/L
ANION GAP SERPL CALC-SCNC: 13 MMOL/L
AST SERPL-CCNC: 34 U/L
BASOPHILS # BLD AUTO: 0.07 K/UL
BASOPHILS NFR BLD AUTO: 0.9 %
BILIRUB SERPL-MCNC: 0.2 MG/DL
BUN SERPL-MCNC: 22 MG/DL
CALCIUM SERPL-MCNC: 10.2 MG/DL
CHLORIDE SERPL-SCNC: 102 MMOL/L
CHOLEST SERPL-MCNC: 178 MG/DL
CHOLEST/HDLC SERPL: 2.7 RATIO
CO2 SERPL-SCNC: 26 MMOL/L
CREAT SERPL-MCNC: 0.96 MG/DL
EOSINOPHIL # BLD AUTO: 0.16 K/UL
EOSINOPHIL NFR BLD AUTO: 2.1 %
ESTIMATED AVERAGE GLUCOSE: 120 MG/DL
FERRITIN SERPL-MCNC: 28 NG/ML
GLUCOSE SERPL-MCNC: 80 MG/DL
HBA1C MFR BLD HPLC: 5.8 %
HCT VFR BLD CALC: 45.3 %
HDLC SERPL-MCNC: 67 MG/DL
HGB BLD-MCNC: 13.7 G/DL
IMM GRANULOCYTES NFR BLD AUTO: 0.3 %
IRON SATN MFR SERPL: 12 %
IRON SERPL-MCNC: 42 UG/DL
LDLC SERPL CALC-MCNC: 91 MG/DL
LYMPHOCYTES # BLD AUTO: 3.75 K/UL
LYMPHOCYTES NFR BLD AUTO: 48.6 %
MAGNESIUM SERPL-MCNC: 1.7 MG/DL
MAN DIFF?: NORMAL
MCHC RBC-ENTMCNC: 28.7 PG
MCHC RBC-ENTMCNC: 30.2 GM/DL
MCV RBC AUTO: 95 FL
MONOCYTES # BLD AUTO: 0.51 K/UL
MONOCYTES NFR BLD AUTO: 6.6 %
NEUTROPHILS # BLD AUTO: 3.2 K/UL
NEUTROPHILS NFR BLD AUTO: 41.5 %
PLATELET # BLD AUTO: 213 K/UL
POTASSIUM SERPL-SCNC: 5 MMOL/L
PROT SERPL-MCNC: 6.7 G/DL
RBC # BLD: 4.77 M/UL
RBC # FLD: 13.9 %
SODIUM SERPL-SCNC: 141 MMOL/L
T3 SERPL-MCNC: 110 NG/DL
T3FREE SERPL-MCNC: 3.11 PG/ML
T4 FREE SERPL-MCNC: 1.1 NG/DL
TIBC SERPL-MCNC: 362 UG/DL
TRIGL SERPL-MCNC: 98 MG/DL
TSH SERPL-ACNC: 5.45 UIU/ML
UIBC SERPL-MCNC: 320 UG/DL
WBC # FLD AUTO: 7.71 K/UL

## 2020-10-20 ENCOUNTER — LABORATORY RESULT (OUTPATIENT)
Age: 70
End: 2020-10-20

## 2020-10-24 ENCOUNTER — TRANSCRIPTION ENCOUNTER (OUTPATIENT)
Age: 70
End: 2020-10-24

## 2020-10-28 ENCOUNTER — TRANSCRIPTION ENCOUNTER (OUTPATIENT)
Age: 70
End: 2020-10-28

## 2020-10-28 ENCOUNTER — RX RENEWAL (OUTPATIENT)
Age: 70
End: 2020-10-28

## 2020-10-31 ENCOUNTER — RX RENEWAL (OUTPATIENT)
Age: 70
End: 2020-10-31

## 2020-11-03 ENCOUNTER — TRANSCRIPTION ENCOUNTER (OUTPATIENT)
Age: 70
End: 2020-11-03

## 2020-11-17 ENCOUNTER — TRANSCRIPTION ENCOUNTER (OUTPATIENT)
Age: 70
End: 2020-11-17

## 2020-11-18 ENCOUNTER — TRANSCRIPTION ENCOUNTER (OUTPATIENT)
Age: 70
End: 2020-11-18

## 2020-12-23 ENCOUNTER — APPOINTMENT (OUTPATIENT)
Dept: FAMILY MEDICINE | Facility: CLINIC | Age: 70
End: 2020-12-23

## 2020-12-26 ENCOUNTER — RX RENEWAL (OUTPATIENT)
Age: 70
End: 2020-12-26

## 2021-01-23 ENCOUNTER — RX RENEWAL (OUTPATIENT)
Age: 71
End: 2021-01-23

## 2021-01-31 ENCOUNTER — TRANSCRIPTION ENCOUNTER (OUTPATIENT)
Age: 71
End: 2021-01-31

## 2021-01-31 ENCOUNTER — RX RENEWAL (OUTPATIENT)
Age: 71
End: 2021-01-31

## 2021-02-17 NOTE — REVIEW OF SYSTEMS
normal gait and station , no tenderness or deformities present [Abdominal Pain] : abdominal pain [Headache] : headache [Negative] : Musculoskeletal

## 2021-02-19 ENCOUNTER — TRANSCRIPTION ENCOUNTER (OUTPATIENT)
Age: 71
End: 2021-02-19

## 2021-04-27 ENCOUNTER — TRANSCRIPTION ENCOUNTER (OUTPATIENT)
Age: 71
End: 2021-04-27

## 2021-04-27 ENCOUNTER — RX RENEWAL (OUTPATIENT)
Age: 71
End: 2021-04-27

## 2021-05-01 ENCOUNTER — RX RENEWAL (OUTPATIENT)
Age: 71
End: 2021-05-01

## 2021-07-12 ENCOUNTER — APPOINTMENT (OUTPATIENT)
Dept: FAMILY MEDICINE | Facility: CLINIC | Age: 71
End: 2021-07-12
Payer: MEDICARE

## 2021-07-12 VITALS
WEIGHT: 128 LBS | BODY MASS INDEX: 22.68 KG/M2 | OXYGEN SATURATION: 96 % | DIASTOLIC BLOOD PRESSURE: 80 MMHG | RESPIRATION RATE: 14 BRPM | SYSTOLIC BLOOD PRESSURE: 126 MMHG | HEIGHT: 63 IN | HEART RATE: 62 BPM

## 2021-07-12 VITALS — TEMPERATURE: 97 F

## 2021-07-12 PROCEDURE — 99213 OFFICE O/P EST LOW 20 MIN: CPT | Mod: 25

## 2021-07-12 PROCEDURE — G0442 ANNUAL ALCOHOL SCREEN 15 MIN: CPT | Mod: 59

## 2021-07-12 PROCEDURE — G0444 DEPRESSION SCREEN ANNUAL: CPT | Mod: 59

## 2021-07-12 RX ORDER — TOBRAMYCIN 3 MG/ML
0.3 SOLUTION/ DROPS OPHTHALMIC 3 TIMES DAILY
Qty: 1 | Refills: 0 | Status: COMPLETED | COMMUNITY
Start: 2021-02-19 | End: 2021-07-12

## 2021-07-12 RX ORDER — LISINOPRIL 10 MG/1
10 TABLET ORAL
Qty: 90 | Refills: 0 | Status: DISCONTINUED | COMMUNITY
Start: 2020-09-09 | End: 2021-07-12

## 2021-07-12 RX ORDER — POTASSIUM CHLORIDE 600 MG/1
8 CAPSULE, EXTENDED RELEASE ORAL DAILY
Refills: 0 | Status: COMPLETED | COMMUNITY
End: 2021-07-12

## 2021-07-12 RX ORDER — METOPROLOL SUCCINATE 100 MG/1
100 TABLET, EXTENDED RELEASE ORAL DAILY
Qty: 90 | Refills: 3 | Status: DISCONTINUED | COMMUNITY
Start: 1900-01-01 | End: 2021-07-12

## 2021-07-12 RX ORDER — MAGNESIUM OXIDE 241.3 MG/1000MG
400 TABLET ORAL DAILY
Qty: 90 | Refills: 3 | Status: DISCONTINUED | COMMUNITY
Start: 2020-05-07 | End: 2021-07-12

## 2021-07-13 RX ORDER — CLOBETASOL PROPIONATE 0.5 MG/G
0.05 AEROSOL, FOAM TOPICAL
Qty: 1 | Refills: 3 | Status: DISCONTINUED | COMMUNITY
Start: 2021-07-12 | End: 2021-07-13

## 2021-07-13 NOTE — PHYSICAL EXAM
[Normal] : no respiratory distress, lungs were clear to auscultation bilaterally and no accessory muscle use [de-identified] : occipital pipelike swelling 3 mm horizontal on scalp no rash. terribly painful w palpation

## 2021-07-13 NOTE — ASSESSMENT
[FreeTextEntry1] : Take prednisone taper for swelling and itch. Apply foam sparingly twice a day. Medications and side effects reviewed. Avoid itching. Use cooler water with bathing.  Follow up if no better with dermatitis.\par

## 2021-07-13 NOTE — HEALTH RISK ASSESSMENT
[0] : 2) Feeling down, depressed, or hopeless: Not at all (0) [PHQ-2 Negative - No further assessment needed] : PHQ-2 Negative - No further assessment needed [I have developed a follow-up plan documented below in the note.] : I have developed a follow-up plan documented below in the note. [HCC1Iohme] : 0

## 2021-07-13 NOTE — HISTORY OF PRESENT ILLNESS
[FreeTextEntry8] : pt presents for itchy scalp . \par low on the scalp there is something straight across and it is massively itchy  it has been there for a month it is terrible painful too

## 2021-07-14 RX ORDER — CLOBETASOL PROPIONATE 0.5 MG/G
0.05 CREAM TOPICAL TWICE DAILY
Qty: 60 | Refills: 1 | Status: ACTIVE | COMMUNITY
Start: 2021-07-14 | End: 1900-01-01

## 2021-07-14 RX ORDER — CLOBETASOL PROPIONATE 0.05 G/ML
0.05 SPRAY TOPICAL TWICE DAILY
Qty: 1 | Refills: 6 | Status: DISCONTINUED | COMMUNITY
Start: 2021-07-13 | End: 2021-07-14

## 2021-07-17 ENCOUNTER — RX RENEWAL (OUTPATIENT)
Age: 71
End: 2021-07-17

## 2021-07-24 ENCOUNTER — RX RENEWAL (OUTPATIENT)
Age: 71
End: 2021-07-24

## 2021-07-31 ENCOUNTER — RX RENEWAL (OUTPATIENT)
Age: 71
End: 2021-07-31

## 2021-10-12 ENCOUNTER — RX RENEWAL (OUTPATIENT)
Age: 71
End: 2021-10-12

## 2021-10-14 ENCOUNTER — TRANSCRIPTION ENCOUNTER (OUTPATIENT)
Age: 71
End: 2021-10-14

## 2021-10-22 ENCOUNTER — TRANSCRIPTION ENCOUNTER (OUTPATIENT)
Age: 71
End: 2021-10-22

## 2021-10-22 ENCOUNTER — RX RENEWAL (OUTPATIENT)
Age: 71
End: 2021-10-22

## 2021-10-26 RX ORDER — LISINOPRIL 20 MG/1
20 TABLET ORAL
Qty: 180 | Refills: 0 | Status: DISCONTINUED | COMMUNITY
Start: 2020-04-13 | End: 2021-10-26

## 2021-10-27 ENCOUNTER — RX RENEWAL (OUTPATIENT)
Age: 71
End: 2021-10-27

## 2021-12-13 ENCOUNTER — APPOINTMENT (OUTPATIENT)
Dept: FAMILY MEDICINE | Facility: CLINIC | Age: 71
End: 2021-12-13
Payer: MEDICARE

## 2021-12-13 DIAGNOSIS — B96.89 ACUTE SINUSITIS, UNSPECIFIED: ICD-10-CM

## 2021-12-13 DIAGNOSIS — J01.90 ACUTE SINUSITIS, UNSPECIFIED: ICD-10-CM

## 2021-12-13 PROCEDURE — 99441: CPT | Mod: 95

## 2021-12-13 RX ORDER — PREDNISONE 20 MG/1
20 TABLET ORAL
Qty: 16 | Refills: 0 | Status: COMPLETED | COMMUNITY
Start: 2021-07-12 | End: 2021-12-13

## 2021-12-13 NOTE — ASSESSMENT
[FreeTextEntry1] : Please note this assessment was done using telemedicine as a result of social distancing due to the outbreak of COVID 19 .\par Discussed the following risk reducing strategies. - Wash hands with soap and water , use alcohol based hand  when hand washing is not an option. Maintain social distancing of at least 6 feet whenever possible , avoid hand shaking, avoid touching eyes, nose and mouth, cover mouth when coughing or sneezing, avoid close contact with sick people. seek medical help if fever, or worse symptoms cough chest pain, or shortness of breath.\par \par Take antibiotics,  rest,  increase fluids, wash hands to prevent the spread of  infection . Take mucinex   over the counter. Take tylenol or advil for fever or body aches. Follow up if no better or worse respiratory symptoms.\par

## 2021-12-13 NOTE — HISTORY OF PRESENT ILLNESS
[Home] : at home, [unfilled] , at the time of the visit. [Medical Office: ([facility])___] : at the medical office located in  [Verbal consent obtained from patient] : the patient, [unfilled] [FreeTextEntry8] : Patient initiated communication for concern via HIPAA secure platform  (Telemedicine )  for     sore throat                 using           phone   .Reviewed quarantine instructions and self isolation to limit spread of disease  as per DOLORES guidelines.  Patient is an established patient and has verbalized consent to be treated via telemedicine .\par she has sore throat cough massive headache , sinus. she was w grandkids they are sick for 2 weeks. she prob got what her grandkids got. the kids and the father were neg for covid. she is vaccinated.

## 2021-12-13 NOTE — REVIEW OF SYSTEMS
[Fatigue] : fatigue [Sore Throat] : sore throat [Cough] : cough [Headache] : headache [Negative] : Gastrointestinal

## 2021-12-16 ENCOUNTER — TRANSCRIPTION ENCOUNTER (OUTPATIENT)
Age: 71
End: 2021-12-16

## 2021-12-28 ENCOUNTER — TRANSCRIPTION ENCOUNTER (OUTPATIENT)
Age: 71
End: 2021-12-28

## 2022-01-10 ENCOUNTER — RX RENEWAL (OUTPATIENT)
Age: 72
End: 2022-01-10

## 2022-01-20 ENCOUNTER — RX RENEWAL (OUTPATIENT)
Age: 72
End: 2022-01-20

## 2022-02-17 ENCOUNTER — RX RENEWAL (OUTPATIENT)
Age: 72
End: 2022-02-17

## 2022-04-11 ENCOUNTER — RX RENEWAL (OUTPATIENT)
Age: 72
End: 2022-04-11

## 2022-04-19 ENCOUNTER — RX RENEWAL (OUTPATIENT)
Age: 72
End: 2022-04-19

## 2022-04-21 ENCOUNTER — APPOINTMENT (OUTPATIENT)
Dept: FAMILY MEDICINE | Facility: CLINIC | Age: 72
End: 2022-04-21
Payer: MEDICARE

## 2022-04-21 PROCEDURE — 99442: CPT | Mod: CS,95

## 2022-04-21 NOTE — ASSESSMENT
[FreeTextEntry1] : Please note this assessment was done using telemedicine as a result of social distancing due to the outbreak of COVID 19 .\par If you test positive for Covid -19  ( isolate) : Everyone regardless of vaccination status, Stay home for 5 days. If you have no symptoms or your symptoms are resolving after 5 days, you can leave your house. Continue to wear a mask around others for additional days. If you have a fever, continue to stay home until your fever resolves. \par \par If you were exposed to someone with Covid 19-(quarantine):  If you: Have been boosted or completed the primary series of Pfizer or Moderna vaccine within the last 6 months or completed the primary series of J&J vaccine within the last 2 months - wear a mask around others for 10 days. Test on day 5 if possible. If you develop symptoms get a test and stay home. \par If you completed the primary series of Pfizer or Moderna vaccine over 5 months ago and are not boosted or completed the primary series of J&J over 2 months ago and are not boosted or are unvaccinated - stay home for 5 days . After that continue to wear a mask around others for 5 additional days. If you cant quarantine, you must wear a mask for 10 days. Test on day 5 if possible. If you develop symptoms get a test and stay home.  [facility] Covid support line [phone]  8 am-10 pm 7 days a week.\par pt does not want treatment. SHe is going to continue conservative supportive care, tylenol advil fluids rest.

## 2022-04-21 NOTE — REVIEW OF SYSTEMS
[Fatigue] : fatigue [Joint Stiffness] : joint stiffness [Muscle Pain] : muscle pain [Headache] : headache [Negative] : Respiratory

## 2022-04-21 NOTE — HISTORY OF PRESENT ILLNESS
[Home] : at home, [unfilled] , at the time of the visit. [Medical Office: ([facility])___] : at the medical office located in  [Verbal consent obtained from patient] : the patient, [unfilled] [FreeTextEntry8] : Patient initiated communication for concern via HIPAA secure platform  (Telemedicine )  for        covid              using    phone          .Reviewed quarantine instructions and self isolation to limit spread of disease  as per DOLORES guidelines.  Patient is an established patient and has verbalized consent to be treated via telemedicine .\par co covid ha sick to stomach back and legs hurting tired no shortness of breath no cp or coughing\par he was w brother on sunday. . he was exposed on Friday.  sister tested was pos . she tested home kit and she was positive tuesday . I felt symptomatic Sunday. .

## 2022-05-18 ENCOUNTER — RX RENEWAL (OUTPATIENT)
Age: 72
End: 2022-05-18

## 2022-06-20 ENCOUNTER — APPOINTMENT (OUTPATIENT)
Dept: FAMILY MEDICINE | Facility: CLINIC | Age: 72
End: 2022-06-20
Payer: MEDICARE

## 2022-06-20 ENCOUNTER — TRANSCRIPTION ENCOUNTER (OUTPATIENT)
Age: 72
End: 2022-06-20

## 2022-06-20 DIAGNOSIS — J20.9 ACUTE BRONCHITIS, UNSPECIFIED: ICD-10-CM

## 2022-06-20 PROCEDURE — 99442: CPT | Mod: 95

## 2022-06-20 RX ORDER — PREDNISONE 10 MG/1
10 TABLET ORAL
Qty: 10 | Refills: 0 | Status: COMPLETED | COMMUNITY
Start: 2022-03-22

## 2022-06-20 NOTE — HISTORY OF PRESENT ILLNESS
[Verbal consent obtained from patient] : the patient, [unfilled] [Other Location: e.g. School (Enter Location, City,State)___] : at [unfilled], at the time of the visit. [Medical Office: ([facility])___] : at the medical office located in  [FreeTextEntry8] : Patient initiated communication for concern via HIPAA secure platform  (Telemedicine )  for     bronchitis                 using      telehealth        .Reviewed quarantine instructions and self isolation to limit spread of disease  as per DOLORES guidelines.  Patient is an established patient and has verbalized consent to be treated via telemedicine .\par  Since Friday she was coughing and has nasal congestion w    green mucous . Head is pounding . We went to walk in [place]  cxr  was questionable.\par she prescribed zpack. mucinex kept her up all night. \par covid was negative

## 2022-06-20 NOTE — ASSESSMENT
[FreeTextEntry1] : Please note this assessment was done using telemedicine as a result of social distancing due to the outbreak of COVID 19 .\par Take antibiotics,  rest,  increase fluids, wash hands to prevent the spread of  infection . Take mucinex   over the counter. Take tylenol or advil for fever or body aches. Follow up if no better or worse respiratory symptoms.\par Take prednisone for wheezing. Reviewed prednisone instructions and taper. \par \par \par

## 2022-06-20 NOTE — PHYSICAL EXAM
[Ill-Appearing] : ill-appearing [Wet Cough] : a wet cough was heard [Scattered Wheezes] : scattered wheezing was heard

## 2022-06-24 ENCOUNTER — TRANSCRIPTION ENCOUNTER (OUTPATIENT)
Age: 72
End: 2022-06-24

## 2022-07-14 ENCOUNTER — APPOINTMENT (OUTPATIENT)
Dept: FAMILY MEDICINE | Facility: CLINIC | Age: 72
End: 2022-07-14

## 2022-07-14 ENCOUNTER — RX RENEWAL (OUTPATIENT)
Age: 72
End: 2022-07-14

## 2022-07-14 PROCEDURE — 99443: CPT | Mod: 95

## 2022-07-14 NOTE — HISTORY OF PRESENT ILLNESS
[Home] : at home, [unfilled] , at the time of the visit. [Medical Office: ([facility])___] : at the medical office located in  [Verbal consent obtained from patient] : the patient, [unfilled] [FreeTextEntry1] : acute symptoms high bp [de-identified] : Patient initiated communication for concern via HIPAA secure platform  (Telemedicine )  for    palpitations headache                 using          phone    .Reviewed quarantine instructions and self isolation to limit spread of disease  as per DOLORES guidelines.  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 she is going home now. she has improved symptoms. she has no chest pain. she has no sob. it is getting better She denies symptoms of stroke \par she wanted to know if she can take extra metoprolol

## 2022-07-15 ENCOUNTER — TRANSCRIPTION ENCOUNTER (OUTPATIENT)
Age: 72
End: 2022-07-15

## 2022-07-17 ENCOUNTER — RX RENEWAL (OUTPATIENT)
Age: 72
End: 2022-07-17

## 2022-07-19 ENCOUNTER — RX RENEWAL (OUTPATIENT)
Age: 72
End: 2022-07-19

## 2022-09-06 ENCOUNTER — TRANSCRIPTION ENCOUNTER (OUTPATIENT)
Age: 72
End: 2022-09-06

## 2022-09-15 DIAGNOSIS — R93.1 ABNORMAL FINDINGS ON DIAGNOSTIC IMAGING OF HEART AND CORONARY CIRCULATION: ICD-10-CM

## 2022-09-15 DIAGNOSIS — I70.0 ATHEROSCLEROSIS OF AORTA: ICD-10-CM

## 2022-09-15 DIAGNOSIS — Z92.89 PERSONAL HISTORY OF OTHER MEDICAL TREATMENT: ICD-10-CM

## 2022-10-11 ENCOUNTER — RX RENEWAL (OUTPATIENT)
Age: 72
End: 2022-10-11

## 2022-10-14 ENCOUNTER — APPOINTMENT (OUTPATIENT)
Dept: FAMILY MEDICINE | Facility: CLINIC | Age: 72
End: 2022-10-14

## 2022-10-14 VITALS
HEART RATE: 65 BPM | DIASTOLIC BLOOD PRESSURE: 70 MMHG | OXYGEN SATURATION: 100 % | WEIGHT: 123 LBS | RESPIRATION RATE: 14 BRPM | HEIGHT: 63 IN | BODY MASS INDEX: 21.79 KG/M2 | SYSTOLIC BLOOD PRESSURE: 102 MMHG

## 2022-10-14 VITALS — TEMPERATURE: 97.3 F

## 2022-10-14 DIAGNOSIS — Z00.00 ENCOUNTER FOR GENERAL ADULT MEDICAL EXAMINATION W/OUT ABNORMAL FINDINGS: ICD-10-CM

## 2022-10-14 DIAGNOSIS — S06.9XAA UNSPECIFIED INTRACRANIAL INJURY WITH LOSS OF CONSCIOUSNESS STATUS UNKNOWN, INITIAL ENCOUNTER: ICD-10-CM

## 2022-10-14 PROCEDURE — 90662 IIV NO PRSV INCREASED AG IM: CPT

## 2022-10-14 PROCEDURE — 69210 REMOVE IMPACTED EAR WAX UNI: CPT

## 2022-10-14 PROCEDURE — 99214 OFFICE O/P EST MOD 30 MIN: CPT | Mod: 25

## 2022-10-14 PROCEDURE — G0008: CPT

## 2022-10-14 RX ORDER — LISINOPRIL 40 MG/1
40 TABLET ORAL
Refills: 0 | Status: DISCONTINUED | COMMUNITY
End: 2022-10-14

## 2022-10-14 RX ORDER — AZITHROMYCIN 250 MG/1
250 TABLET, FILM COATED ORAL
Qty: 1 | Refills: 1 | Status: COMPLETED | COMMUNITY
Start: 2019-11-11 | End: 2022-10-14

## 2022-10-14 RX ORDER — PREDNISONE 20 MG/1
20 TABLET ORAL
Qty: 16 | Refills: 0 | Status: COMPLETED | COMMUNITY
Start: 2022-06-20 | End: 2022-10-14

## 2022-10-14 NOTE — HEALTH RISK ASSESSMENT
[Never] : Never [No] : In the past 12 months have you used drugs other than those required for medical reasons? No [Any fall with injury in past year] : Patient reported fall with injury in the past year [1] : 2) Feeling down, depressed, or hopeless for several days (1) [PHQ-2 Negative - No further assessment needed] : PHQ-2 Negative - No further assessment needed [I have developed a follow-up plan documented below in the note.] : I have developed a follow-up plan documented below in the note. 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

## 2022-10-14 NOTE — PHYSICAL EXAM
[Normal] : normal gait, coordination grossly intact, no focal deficits and deep tendon reflexes were 2+ and symmetric [de-identified] : clogged  ears

## 2022-10-14 NOTE — ASSESSMENT
[FreeTextEntry1] : ear irrigated good results\par \par bp low . take lisinopril 10 mg\par  she is on 20 mg\par \par anxiety- hyper- take cymbalta 60 mg . she was on 90 mg\par \par poor memory hx tbi. concussion. trial memantine 10 mg qd \par consider mri brain no contrast\par follow up 1 m\par \par  Information regarding flu shot reviewed. All questions answered.Flu shot .7 cc IM  r    deltoid . No reaction noted. Advised patients to wash hands to reduce the spread of infection.\par

## 2022-10-15 ENCOUNTER — TRANSCRIPTION ENCOUNTER (OUTPATIENT)
Age: 72
End: 2022-10-15

## 2022-10-16 LAB
RAPID RVP RESULT: NOT DETECTED
SARS-COV-2 RNA PNL RESP NAA+PROBE: NOT DETECTED

## 2022-10-17 ENCOUNTER — RX RENEWAL (OUTPATIENT)
Age: 72
End: 2022-10-17

## 2022-10-26 ENCOUNTER — APPOINTMENT (OUTPATIENT)
Dept: FAMILY MEDICINE | Facility: CLINIC | Age: 72
End: 2022-10-26

## 2022-11-14 ENCOUNTER — RX RENEWAL (OUTPATIENT)
Age: 72
End: 2022-11-14

## 2022-11-14 ENCOUNTER — TRANSCRIPTION ENCOUNTER (OUTPATIENT)
Age: 72
End: 2022-11-14

## 2022-11-15 ENCOUNTER — TRANSCRIPTION ENCOUNTER (OUTPATIENT)
Age: 72
End: 2022-11-15

## 2022-11-18 ENCOUNTER — APPOINTMENT (OUTPATIENT)
Dept: FAMILY MEDICINE | Facility: CLINIC | Age: 72
End: 2022-11-18

## 2022-11-22 ENCOUNTER — RX RENEWAL (OUTPATIENT)
Age: 72
End: 2022-11-22

## 2022-12-14 ENCOUNTER — APPOINTMENT (OUTPATIENT)
Dept: FAMILY MEDICINE | Facility: CLINIC | Age: 72
End: 2022-12-14

## 2022-12-14 PROCEDURE — 99213 OFFICE O/P EST LOW 20 MIN: CPT | Mod: 95

## 2022-12-14 NOTE — ASSESSMENT
[FreeTextEntry1] : pt diagnosed w influenza by urgent care\par she is wheezing \par Take prednisone for wheezing. Reviewed prednisone instructions and taper. \par \par take tamiflu for influenza\par We spent sufficient time to discuss aspects of care; questions were answered  to patient's satisfaction.The diagnosis and care plan were discussed with patient in detail.  Patient test results were  reviewed and explained in full. All questions and concerns  were answered to the best of my knowledge.\par 
Improved

## 2022-12-14 NOTE — REVIEW OF SYSTEMS
[Fever] : fever [Chills] : chills [Shortness Of Breath] : shortness of breath [Wheezing] : wheezing [Cough] : cough

## 2022-12-14 NOTE — HISTORY OF PRESENT ILLNESS
[FreeTextEntry8] : Patient initiated communication for concern via HIPAA secure platform  (Telemedicine )  for       cough /  influenza               using    telemedicine          .Reviewed quarantine instructions and self isolation to limit spread of disease  as per DOLORES guidelines.  Patient is an established patient and has verbalized consent to be treated via telemedicine .\par she was feeling sick and went to urgent care in [place] and she has the flu 12/10 she gave her the zpack and benzonate and cefzil. \par "benzonatate "  she could only afford a zpack  she is feeling better than she was but the cough is bad

## 2022-12-16 ENCOUNTER — TRANSCRIPTION ENCOUNTER (OUTPATIENT)
Age: 72
End: 2022-12-16

## 2022-12-21 RX ORDER — ALBUTEROL SULFATE 2.5 MG/3ML
(2.5 MG/3ML) SOLUTION RESPIRATORY (INHALATION)
Qty: 1 | Refills: 0 | Status: ACTIVE | COMMUNITY
Start: 2019-12-26 | End: 1900-01-01

## 2022-12-22 ENCOUNTER — TRANSCRIPTION ENCOUNTER (OUTPATIENT)
Age: 72
End: 2022-12-22

## 2022-12-22 DIAGNOSIS — J45.909 UNSPECIFIED ASTHMA, UNCOMPLICATED: ICD-10-CM

## 2022-12-22 RX ORDER — ALBUTEROL SULFATE 90 UG/1
108 (90 BASE) INHALANT RESPIRATORY (INHALATION)
Qty: 1 | Refills: 11 | Status: ACTIVE | COMMUNITY
Start: 2022-12-22

## 2022-12-22 RX ORDER — ALBUTEROL SULFATE 90 UG/1
108 (90 BASE) INHALANT RESPIRATORY (INHALATION)
Qty: 1 | Refills: 11 | Status: ACTIVE | COMMUNITY
Start: 2022-12-22 | End: 1900-01-01

## 2022-12-23 ENCOUNTER — TRANSCRIPTION ENCOUNTER (OUTPATIENT)
Age: 72
End: 2022-12-23

## 2022-12-27 ENCOUNTER — TRANSCRIPTION ENCOUNTER (OUTPATIENT)
Age: 72
End: 2022-12-27

## 2023-01-11 ENCOUNTER — RX RENEWAL (OUTPATIENT)
Age: 73
End: 2023-01-11

## 2023-01-19 ENCOUNTER — APPOINTMENT (OUTPATIENT)
Dept: FAMILY MEDICINE | Facility: CLINIC | Age: 73
End: 2023-01-19
Payer: MEDICARE

## 2023-01-19 VITALS
RESPIRATION RATE: 12 BRPM | DIASTOLIC BLOOD PRESSURE: 80 MMHG | OXYGEN SATURATION: 98 % | HEIGHT: 63 IN | WEIGHT: 128 LBS | HEART RATE: 56 BPM | SYSTOLIC BLOOD PRESSURE: 126 MMHG | BODY MASS INDEX: 22.68 KG/M2

## 2023-01-19 VITALS — TEMPERATURE: 97.6 F
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
## 2023-01-19 DIAGNOSIS — Z87.09 PERSONAL HISTORY OF OTHER DISEASES OF THE RESPIRATORY SYSTEM: ICD-10-CM

## 2023-01-19 DIAGNOSIS — R73.09 OTHER ABNORMAL GLUCOSE: ICD-10-CM

## 2023-01-19 DIAGNOSIS — Z92.29 PERSONAL HISTORY OF OTHER DRUG THERAPY: ICD-10-CM

## 2023-01-19 DIAGNOSIS — L29.9 PRURITUS, UNSPECIFIED: ICD-10-CM

## 2023-01-19 DIAGNOSIS — H93.8X9 OTHER SPECIFIED DISORDERS OF EAR, UNSPECIFIED EAR: ICD-10-CM

## 2023-01-19 DIAGNOSIS — Z86.69 PERSONAL HISTORY OF OTHER DISEASES OF THE NERVOUS SYSTEM AND SENSE ORGANS: ICD-10-CM
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
## 2023-01-19 PROCEDURE — 99213 OFFICE O/P EST LOW 20 MIN: CPT | Mod: 25

## 2023-01-19 PROCEDURE — 69210 REMOVE IMPACTED EAR WAX UNI: CPT

## 2023-01-20 LAB
RAPID RVP RESULT: NOT DETECTED
SARS-COV-2 RNA PNL RESP NAA+PROBE: NOT DETECTED

## 2023-01-27 NOTE — ASSESSMENT
Attempted to reach patient. No answer. Left VM to call clinic back at [phone]. When pt returns call, phone nurse please relay message from Dr. Gatica below. If pt agreeable, ok to route back to En nurse pool to enter reminder. Thank you   [FreeTextEntry1] : Please note this assessment was done using telemedicine as a result of social distancing due to the outbreak of COVID 19 .\par check bp pulse. \par 144/102 p 93 ok to take metoprolol er 50 mg again today\par  \par Activity as tolerated\par Go to ER if worse chest pain or shortness of breath.or headache persists or signs of stroke, \par

## 2023-01-31 ENCOUNTER — TRANSCRIPTION ENCOUNTER (OUTPATIENT)
Age: 73
End: 2023-01-31

## 2023-02-10 ENCOUNTER — RX RENEWAL (OUTPATIENT)
Age: 73
End: 2023-02-10

## 2023-02-10 ENCOUNTER — TRANSCRIPTION ENCOUNTER (OUTPATIENT)
Age: 73
End: 2023-02-10

## 2023-02-10 RX ORDER — OSELTAMIVIR PHOSPHATE 75 MG/1
75 CAPSULE ORAL TWICE DAILY
Qty: 10 | Refills: 0 | Status: COMPLETED | COMMUNITY
Start: 2022-12-14 | End: 2023-02-10

## 2023-02-12 ENCOUNTER — TRANSCRIPTION ENCOUNTER (OUTPATIENT)
Age: 73
End: 2023-02-12

## 2023-03-06 ENCOUNTER — APPOINTMENT (OUTPATIENT)
Dept: FAMILY MEDICINE | Facility: CLINIC | Age: 73
End: 2023-03-06
Payer: MEDICARE

## 2023-03-06 DIAGNOSIS — F41.9 ANXIETY DISORDER, UNSPECIFIED: ICD-10-CM

## 2023-03-06 PROCEDURE — 99443: CPT | Mod: 95
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
## 2023-04-11 ENCOUNTER — RX RENEWAL (OUTPATIENT)
Age: 73
End: 2023-04-11

## 2023-04-12 ENCOUNTER — TRANSCRIPTION ENCOUNTER (OUTPATIENT)
Age: 73
End: 2023-04-12

## 2023-04-14 ENCOUNTER — RX RENEWAL (OUTPATIENT)
Age: 73
End: 2023-04-14

## 2023-04-18 ENCOUNTER — RX RENEWAL (OUTPATIENT)
Age: 73
End: 2023-04-18

## 2023-04-20 ENCOUNTER — RX RENEWAL (OUTPATIENT)
Age: 73
End: 2023-04-20

## 2023-05-16 ENCOUNTER — RX RENEWAL (OUTPATIENT)
Age: 73
End: 2023-05-16

## 2023-05-17 ENCOUNTER — TRANSCRIPTION ENCOUNTER (OUTPATIENT)
Age: 73
End: 2023-05-17

## 2023-05-31 ENCOUNTER — TRANSCRIPTION ENCOUNTER (OUTPATIENT)
Age: 73
End: 2023-05-31

## 2023-06-01 ENCOUNTER — APPOINTMENT (OUTPATIENT)
Dept: FAMILY MEDICINE | Facility: CLINIC | Age: 73
End: 2023-06-01
Payer: MEDICARE

## 2023-06-01 ENCOUNTER — TRANSCRIPTION ENCOUNTER (OUTPATIENT)
Age: 73
End: 2023-06-01

## 2023-06-01 DIAGNOSIS — K52.9 NONINFECTIVE GASTROENTERITIS AND COLITIS, UNSPECIFIED: ICD-10-CM

## 2023-06-01 DIAGNOSIS — R19.7 DIARRHEA, UNSPECIFIED: ICD-10-CM

## 2023-06-01 DIAGNOSIS — U07.1 COVID-19: ICD-10-CM

## 2023-06-01 PROCEDURE — 99441: CPT | Mod: 95

## 2023-06-01 RX ORDER — METHYLPREDNISOLONE 4 MG/1
4 TABLET ORAL
Qty: 1 | Refills: 0 | Status: DISCONTINUED | COMMUNITY
Start: 2022-12-22 | End: 2023-06-01

## 2023-06-01 RX ORDER — AZITHROMYCIN 250 MG/1
250 TABLET, FILM COATED ORAL
Qty: 1 | Refills: 0 | Status: DISCONTINUED | COMMUNITY
Start: 2022-12-16 | End: 2023-06-01

## 2023-06-01 RX ORDER — AMOXICILLIN AND CLAVULANATE POTASSIUM 875; 125 MG/1; MG/1
875-125 TABLET, COATED ORAL
Qty: 20 | Refills: 0 | Status: DISCONTINUED | COMMUNITY
Start: 2023-01-19 | End: 2023-06-01

## 2023-06-01 RX ORDER — PREDNISONE 20 MG/1
20 TABLET ORAL
Qty: 15 | Refills: 0 | Status: DISCONTINUED | COMMUNITY
Start: 2022-12-14 | End: 2023-06-01

## 2023-06-01 NOTE — PLAN
[FreeTextEntry1] : Since she was on antibiotic 1 month ago I ordered stool testing to be done to rule out C. difficile.  She was advised to drink electrolyte water or Gatorade and to follow a brat and lactose-free diet.  She was advised that the use of Imodium may prolong her symptoms but that she may take Imodium 1-2 times a day for severe diarrhea if needed.  She will be contacted with the stool test result and will follow-up for any worsening symptoms.

## 2023-06-01 NOTE — HISTORY OF PRESENT ILLNESS
[Home] : at home, [unfilled] , at the time of the visit. [Medical Office: ([facility])___] : at the medical office located in  [Verbal consent obtained from patient] : the patient, [unfilled] [FreeTextEntry8] : The patient telephoned and requested a call back to discuss their health.  The visit was performed over the telephone as the patient was unable to be seen in the office due to the COVID 19 pandemic.  This past Sunday she started having loose stools, abdominal pain and nausea.  She started to feel worse the next day and also had body aches.  She had a low-grade fever of 99.9 degrees on Sunday.  She is still feeling nauseous although has not vomited and is still passing very frequent watery stools.  She denies any malodorous stools.  She took a Z-Rey antibiotic 1 month ago.  Her 2 grandchildren have also been sick with stomach aches and cold symptoms although she denies have any cold symptoms or self.  She is not able to eat anything but is drinking water.  She took 2 Imodium tablets early this morning and it has helped slow down the stool frequency.\par

## 2023-07-10 ENCOUNTER — RX RENEWAL (OUTPATIENT)
Age: 73
End: 2023-07-10

## 2023-07-27 ENCOUNTER — TRANSCRIPTION ENCOUNTER (OUTPATIENT)
Age: 73
End: 2023-07-27

## 2023-08-16 ENCOUNTER — RX RENEWAL (OUTPATIENT)
Age: 73
End: 2023-08-16

## 2023-08-17 ENCOUNTER — RX RENEWAL (OUTPATIENT)
Age: 73
End: 2023-08-17

## 2023-10-07 ENCOUNTER — RX RENEWAL (OUTPATIENT)
Age: 73
End: 2023-10-07

## 2023-10-08 ENCOUNTER — RX RENEWAL (OUTPATIENT)
Age: 73
End: 2023-10-08

## 2023-10-20 ENCOUNTER — RX RENEWAL (OUTPATIENT)
Age: 73
End: 2023-10-20

## 2023-12-01 ENCOUNTER — APPOINTMENT (OUTPATIENT)
Dept: FAMILY MEDICINE | Facility: CLINIC | Age: 73
End: 2023-12-01
Payer: MEDICARE

## 2023-12-01 VITALS
HEIGHT: 63 IN | SYSTOLIC BLOOD PRESSURE: 112 MMHG | BODY MASS INDEX: 21.97 KG/M2 | HEART RATE: 71 BPM | DIASTOLIC BLOOD PRESSURE: 70 MMHG | WEIGHT: 124 LBS | RESPIRATION RATE: 12 BRPM | OXYGEN SATURATION: 99 % | TEMPERATURE: 98 F

## 2023-12-01 DIAGNOSIS — Z23 ENCOUNTER FOR IMMUNIZATION: ICD-10-CM

## 2023-12-01 DIAGNOSIS — E03.9 HYPOTHYROIDISM, UNSPECIFIED: ICD-10-CM

## 2023-12-01 DIAGNOSIS — E78.5 HYPERLIPIDEMIA, UNSPECIFIED: ICD-10-CM

## 2023-12-01 PROCEDURE — 99213 OFFICE O/P EST LOW 20 MIN: CPT | Mod: 25

## 2023-12-01 PROCEDURE — G0008: CPT

## 2023-12-01 PROCEDURE — 90662 IIV NO PRSV INCREASED AG IM: CPT

## 2023-12-01 RX ORDER — MEMANTINE HYDROCHLORIDE 10 MG/1
10 TABLET, FILM COATED ORAL
Qty: 90 | Refills: 3 | Status: DISCONTINUED | COMMUNITY
Start: 2022-10-14 | End: 2023-12-01

## 2024-01-06 ENCOUNTER — RX RENEWAL (OUTPATIENT)
Age: 74
End: 2024-01-06

## 2024-01-09 ENCOUNTER — RX RENEWAL (OUTPATIENT)
Age: 74
End: 2024-01-09

## 2024-01-10 ENCOUNTER — APPOINTMENT (OUTPATIENT)
Dept: FAMILY MEDICINE | Facility: CLINIC | Age: 74
End: 2024-01-10

## 2024-01-10 ENCOUNTER — TRANSCRIPTION ENCOUNTER (OUTPATIENT)
Age: 74
End: 2024-01-10

## 2024-01-12 ENCOUNTER — INPATIENT (INPATIENT)
Facility: HOSPITAL | Age: 74
LOS: 11 days | Discharge: HOME | DRG: 64 | End: 2024-01-24
Attending: NEUROLOGICAL SURGERY | Admitting: PSYCHIATRY & NEUROLOGY
Payer: MEDICARE

## 2024-01-12 VITALS
SYSTOLIC BLOOD PRESSURE: 144 MMHG | OXYGEN SATURATION: 100 % | HEART RATE: 103 BPM | TEMPERATURE: 97 F | DIASTOLIC BLOOD PRESSURE: 77 MMHG | RESPIRATION RATE: 18 BRPM

## 2024-01-12 DIAGNOSIS — I62.9 NONTRAUMATIC INTRACRANIAL HEMORRHAGE, UNSPECIFIED: ICD-10-CM

## 2024-01-12 DIAGNOSIS — Z86.79 PERSONAL HISTORY OF OTHER DISEASES OF THE CIRCULATORY SYSTEM: ICD-10-CM

## 2024-01-12 LAB
ALBUMIN SERPL ELPH-MCNC: 4.2 G/DL — SIGNIFICANT CHANGE UP (ref 3.3–5)
ALBUMIN SERPL ELPH-MCNC: 4.2 G/DL — SIGNIFICANT CHANGE UP (ref 3.3–5)
ALP SERPL-CCNC: 88 U/L — SIGNIFICANT CHANGE UP (ref 40–120)
ALP SERPL-CCNC: 88 U/L — SIGNIFICANT CHANGE UP (ref 40–120)
ALT FLD-CCNC: 27 U/L — SIGNIFICANT CHANGE UP (ref 10–45)
ALT FLD-CCNC: 27 U/L — SIGNIFICANT CHANGE UP (ref 10–45)
ANION GAP SERPL CALC-SCNC: 13 MMOL/L — SIGNIFICANT CHANGE UP (ref 5–17)
ANION GAP SERPL CALC-SCNC: 13 MMOL/L — SIGNIFICANT CHANGE UP (ref 5–17)
APTT BLD: 30 SEC — SIGNIFICANT CHANGE UP (ref 24.5–35.6)
APTT BLD: 30 SEC — SIGNIFICANT CHANGE UP (ref 24.5–35.6)
AST SERPL-CCNC: 33 U/L — SIGNIFICANT CHANGE UP (ref 10–40)
AST SERPL-CCNC: 33 U/L — SIGNIFICANT CHANGE UP (ref 10–40)
BILIRUB SERPL-MCNC: 0.8 MG/DL — SIGNIFICANT CHANGE UP (ref 0.2–1.2)
BILIRUB SERPL-MCNC: 0.8 MG/DL — SIGNIFICANT CHANGE UP (ref 0.2–1.2)
BLD GP AB SCN SERPL QL: NEGATIVE — SIGNIFICANT CHANGE UP
BLD GP AB SCN SERPL QL: NEGATIVE — SIGNIFICANT CHANGE UP
BUN SERPL-MCNC: 7 MG/DL — SIGNIFICANT CHANGE UP (ref 7–23)
BUN SERPL-MCNC: 7 MG/DL — SIGNIFICANT CHANGE UP (ref 7–23)
CALCIUM SERPL-MCNC: 9.7 MG/DL — SIGNIFICANT CHANGE UP (ref 8.4–10.5)
CALCIUM SERPL-MCNC: 9.7 MG/DL — SIGNIFICANT CHANGE UP (ref 8.4–10.5)
CHLORIDE SERPL-SCNC: 101 MMOL/L — SIGNIFICANT CHANGE UP (ref 96–108)
CHLORIDE SERPL-SCNC: 101 MMOL/L — SIGNIFICANT CHANGE UP (ref 96–108)
CO2 SERPL-SCNC: 24 MMOL/L — SIGNIFICANT CHANGE UP (ref 22–31)
CO2 SERPL-SCNC: 24 MMOL/L — SIGNIFICANT CHANGE UP (ref 22–31)
CREAT SERPL-MCNC: 0.61 MG/DL — SIGNIFICANT CHANGE UP (ref 0.5–1.3)
CREAT SERPL-MCNC: 0.61 MG/DL — SIGNIFICANT CHANGE UP (ref 0.5–1.3)
EGFR: 95 ML/MIN/1.73M2 — SIGNIFICANT CHANGE UP
EGFR: 95 ML/MIN/1.73M2 — SIGNIFICANT CHANGE UP
GLUCOSE SERPL-MCNC: 99 MG/DL — SIGNIFICANT CHANGE UP (ref 70–99)
GLUCOSE SERPL-MCNC: 99 MG/DL — SIGNIFICANT CHANGE UP (ref 70–99)
HCT VFR BLD CALC: 38.3 % — SIGNIFICANT CHANGE UP (ref 34.5–45)
HCT VFR BLD CALC: 38.3 % — SIGNIFICANT CHANGE UP (ref 34.5–45)
HGB BLD-MCNC: 13.4 G/DL — SIGNIFICANT CHANGE UP (ref 11.5–15.5)
HGB BLD-MCNC: 13.4 G/DL — SIGNIFICANT CHANGE UP (ref 11.5–15.5)
INR BLD: 1.08 RATIO — SIGNIFICANT CHANGE UP (ref 0.85–1.18)
INR BLD: 1.08 RATIO — SIGNIFICANT CHANGE UP (ref 0.85–1.18)
MAGNESIUM SERPL-MCNC: 1.5 MG/DL — LOW (ref 1.6–2.6)
MAGNESIUM SERPL-MCNC: 1.5 MG/DL — LOW (ref 1.6–2.6)
MCHC RBC-ENTMCNC: 32 PG — SIGNIFICANT CHANGE UP (ref 27–34)
MCHC RBC-ENTMCNC: 32 PG — SIGNIFICANT CHANGE UP (ref 27–34)
MCHC RBC-ENTMCNC: 35 GM/DL — SIGNIFICANT CHANGE UP (ref 32–36)
MCHC RBC-ENTMCNC: 35 GM/DL — SIGNIFICANT CHANGE UP (ref 32–36)
MCV RBC AUTO: 91.4 FL — SIGNIFICANT CHANGE UP (ref 80–100)
MCV RBC AUTO: 91.4 FL — SIGNIFICANT CHANGE UP (ref 80–100)
NRBC # BLD: 0 /100 WBCS — SIGNIFICANT CHANGE UP (ref 0–0)
NRBC # BLD: 0 /100 WBCS — SIGNIFICANT CHANGE UP (ref 0–0)
PHOSPHATE SERPL-MCNC: 1.9 MG/DL — LOW (ref 2.5–4.5)
PHOSPHATE SERPL-MCNC: 1.9 MG/DL — LOW (ref 2.5–4.5)
PLATELET # BLD AUTO: 235 K/UL — SIGNIFICANT CHANGE UP (ref 150–400)
PLATELET # BLD AUTO: 235 K/UL — SIGNIFICANT CHANGE UP (ref 150–400)
POTASSIUM SERPL-MCNC: 3.5 MMOL/L — SIGNIFICANT CHANGE UP (ref 3.5–5.3)
POTASSIUM SERPL-MCNC: 3.5 MMOL/L — SIGNIFICANT CHANGE UP (ref 3.5–5.3)
POTASSIUM SERPL-SCNC: 3.5 MMOL/L — SIGNIFICANT CHANGE UP (ref 3.5–5.3)
POTASSIUM SERPL-SCNC: 3.5 MMOL/L — SIGNIFICANT CHANGE UP (ref 3.5–5.3)
PROT SERPL-MCNC: 6.8 G/DL — SIGNIFICANT CHANGE UP (ref 6–8.3)
PROT SERPL-MCNC: 6.8 G/DL — SIGNIFICANT CHANGE UP (ref 6–8.3)
PROTHROM AB SERPL-ACNC: 11.3 SEC — SIGNIFICANT CHANGE UP (ref 9.5–13)
PROTHROM AB SERPL-ACNC: 11.3 SEC — SIGNIFICANT CHANGE UP (ref 9.5–13)
RBC # BLD: 4.19 M/UL — SIGNIFICANT CHANGE UP (ref 3.8–5.2)
RBC # BLD: 4.19 M/UL — SIGNIFICANT CHANGE UP (ref 3.8–5.2)
RBC # FLD: 11.8 % — SIGNIFICANT CHANGE UP (ref 10.3–14.5)
RBC # FLD: 11.8 % — SIGNIFICANT CHANGE UP (ref 10.3–14.5)
RH IG SCN BLD-IMP: NEGATIVE — SIGNIFICANT CHANGE UP
RH IG SCN BLD-IMP: NEGATIVE — SIGNIFICANT CHANGE UP
SODIUM SERPL-SCNC: 138 MMOL/L — SIGNIFICANT CHANGE UP (ref 135–145)
SODIUM SERPL-SCNC: 138 MMOL/L — SIGNIFICANT CHANGE UP (ref 135–145)
WBC # BLD: 13.24 K/UL — HIGH (ref 3.8–10.5)
WBC # BLD: 13.24 K/UL — HIGH (ref 3.8–10.5)
WBC # FLD AUTO: 13.24 K/UL — HIGH (ref 3.8–10.5)
WBC # FLD AUTO: 13.24 K/UL — HIGH (ref 3.8–10.5)

## 2024-01-12 PROCEDURE — 36620 INSERTION CATHETER ARTERY: CPT

## 2024-01-12 PROCEDURE — 99291 CRITICAL CARE FIRST HOUR: CPT

## 2024-01-12 RX ORDER — FAMOTIDINE 10 MG/ML
20 INJECTION INTRAVENOUS DAILY
Refills: 0 | Status: DISCONTINUED | OUTPATIENT
Start: 2024-01-12 | End: 2024-01-16

## 2024-01-12 RX ORDER — POTASSIUM CHLORIDE 20 MEQ
40 PACKET (EA) ORAL ONCE
Refills: 0 | Status: COMPLETED | OUTPATIENT
Start: 2024-01-12 | End: 2024-01-12

## 2024-01-12 RX ORDER — NIMODIPINE 60 MG/10ML
30 SOLUTION ORAL
Refills: 0 | Status: DISCONTINUED | OUTPATIENT
Start: 2024-01-12 | End: 2024-01-12

## 2024-01-12 RX ORDER — DULOXETINE HYDROCHLORIDE 30 MG/1
60 CAPSULE, DELAYED RELEASE ORAL DAILY
Refills: 0 | Status: DISCONTINUED | OUTPATIENT
Start: 2024-01-12 | End: 2024-01-24

## 2024-01-12 RX ORDER — ATORVASTATIN CALCIUM 80 MG/1
20 TABLET, FILM COATED ORAL AT BEDTIME
Refills: 0 | Status: DISCONTINUED | OUTPATIENT
Start: 2024-01-12 | End: 2024-01-24

## 2024-01-12 RX ORDER — LISINOPRIL 2.5 MG/1
10 TABLET ORAL DAILY
Refills: 0 | Status: DISCONTINUED | OUTPATIENT
Start: 2024-01-12 | End: 2024-01-13

## 2024-01-12 RX ORDER — OMEPRAZOLE 10 MG/1
1 CAPSULE, DELAYED RELEASE ORAL
Refills: 0 | DISCHARGE

## 2024-01-12 RX ORDER — METOPROLOL TARTRATE 50 MG
1 TABLET ORAL
Refills: 0 | DISCHARGE

## 2024-01-12 RX ORDER — ATORVASTATIN CALCIUM 80 MG/1
1 TABLET, FILM COATED ORAL
Refills: 0 | DISCHARGE

## 2024-01-12 RX ORDER — ACETAMINOPHEN 500 MG
1000 TABLET ORAL ONCE
Refills: 0 | Status: COMPLETED | OUTPATIENT
Start: 2024-01-12 | End: 2024-01-12

## 2024-01-12 RX ORDER — LEVOTHYROXINE SODIUM 125 MCG
1 TABLET ORAL
Refills: 0 | DISCHARGE

## 2024-01-12 RX ORDER — NICARDIPINE HYDROCHLORIDE 30 MG/1
5 CAPSULE, EXTENDED RELEASE ORAL
Qty: 40 | Refills: 0 | Status: DISCONTINUED | OUTPATIENT
Start: 2024-01-12 | End: 2024-01-12

## 2024-01-12 RX ORDER — LEVOTHYROXINE SODIUM 125 MCG
50 TABLET ORAL DAILY
Refills: 0 | Status: DISCONTINUED | OUTPATIENT
Start: 2024-01-12 | End: 2024-01-17

## 2024-01-12 RX ORDER — SODIUM CHLORIDE 9 MG/ML
500 INJECTION INTRAMUSCULAR; INTRAVENOUS; SUBCUTANEOUS ONCE
Refills: 0 | Status: COMPLETED | OUTPATIENT
Start: 2024-01-12 | End: 2024-01-12

## 2024-01-12 RX ORDER — METOPROLOL TARTRATE 50 MG
25 TABLET ORAL EVERY 12 HOURS
Refills: 0 | Status: DISCONTINUED | OUTPATIENT
Start: 2024-01-12 | End: 2024-01-12

## 2024-01-12 RX ORDER — LEVETIRACETAM 250 MG/1
500 TABLET, FILM COATED ORAL
Refills: 0 | Status: DISCONTINUED | OUTPATIENT
Start: 2024-01-12 | End: 2024-01-17

## 2024-01-12 RX ORDER — MAGNESIUM SULFATE 500 MG/ML
2 VIAL (ML) INJECTION ONCE
Refills: 0 | Status: COMPLETED | OUTPATIENT
Start: 2024-01-12 | End: 2024-01-12

## 2024-01-12 RX ORDER — POTASSIUM PHOSPHATE, MONOBASIC POTASSIUM PHOSPHATE, DIBASIC 236; 224 MG/ML; MG/ML
30 INJECTION, SOLUTION INTRAVENOUS ONCE
Refills: 0 | Status: COMPLETED | OUTPATIENT
Start: 2024-01-12 | End: 2024-01-12

## 2024-01-12 RX ORDER — POLYETHYLENE GLYCOL 3350 17 G/17G
17 POWDER, FOR SOLUTION ORAL DAILY
Refills: 0 | Status: DISCONTINUED | OUTPATIENT
Start: 2024-01-12 | End: 2024-01-14

## 2024-01-12 RX ORDER — DULOXETINE HYDROCHLORIDE 30 MG/1
1 CAPSULE, DELAYED RELEASE ORAL
Refills: 0 | DISCHARGE

## 2024-01-12 RX ORDER — NIMODIPINE 60 MG/10ML
60 SOLUTION ORAL EVERY 4 HOURS
Refills: 0 | Status: DISCONTINUED | OUTPATIENT
Start: 2024-01-12 | End: 2024-01-12

## 2024-01-12 RX ORDER — SENNA PLUS 8.6 MG/1
2 TABLET ORAL AT BEDTIME
Refills: 0 | Status: DISCONTINUED | OUTPATIENT
Start: 2024-01-12 | End: 2024-01-14

## 2024-01-12 RX ORDER — CHLORHEXIDINE GLUCONATE 213 G/1000ML
1 SOLUTION TOPICAL DAILY
Refills: 0 | Status: DISCONTINUED | OUTPATIENT
Start: 2024-01-12 | End: 2024-01-17

## 2024-01-12 RX ORDER — LISINOPRIL 2.5 MG/1
1 TABLET ORAL
Refills: 0 | DISCHARGE

## 2024-01-12 RX ORDER — SODIUM CHLORIDE 9 MG/ML
1000 INJECTION INTRAMUSCULAR; INTRAVENOUS; SUBCUTANEOUS
Refills: 0 | Status: DISCONTINUED | OUTPATIENT
Start: 2024-01-12 | End: 2024-01-12

## 2024-01-12 RX ORDER — LISINOPRIL 2.5 MG/1
10 TABLET ORAL DAILY
Refills: 0 | Status: DISCONTINUED | OUTPATIENT
Start: 2024-01-12 | End: 2024-01-12

## 2024-01-12 RX ORDER — NIMODIPINE 60 MG/10ML
30 SOLUTION ORAL
Refills: 0 | Status: DISCONTINUED | OUTPATIENT
Start: 2024-01-12 | End: 2024-01-13

## 2024-01-12 RX ORDER — METOPROLOL TARTRATE 50 MG
25 TABLET ORAL EVERY 12 HOURS
Refills: 0 | Status: DISCONTINUED | OUTPATIENT
Start: 2024-01-12 | End: 2024-01-13

## 2024-01-12 RX ADMIN — SODIUM CHLORIDE 30 MILLILITER(S): 9 INJECTION INTRAMUSCULAR; INTRAVENOUS; SUBCUTANEOUS at 19:18

## 2024-01-12 RX ADMIN — Medication 400 MILLIGRAM(S): at 21:26

## 2024-01-12 RX ADMIN — NIMODIPINE 60 MILLIGRAM(S): 60 SOLUTION ORAL at 18:00

## 2024-01-12 RX ADMIN — POTASSIUM PHOSPHATE, MONOBASIC POTASSIUM PHOSPHATE, DIBASIC 83.33 MILLIMOLE(S): 236; 224 INJECTION, SOLUTION INTRAVENOUS at 23:23

## 2024-01-12 RX ADMIN — ATORVASTATIN CALCIUM 20 MILLIGRAM(S): 80 TABLET, FILM COATED ORAL at 21:17

## 2024-01-12 RX ADMIN — SODIUM CHLORIDE 1000 MILLILITER(S): 9 INJECTION INTRAMUSCULAR; INTRAVENOUS; SUBCUTANEOUS at 23:25

## 2024-01-12 RX ADMIN — Medication 1000 MILLIGRAM(S): at 21:41

## 2024-01-12 RX ADMIN — SODIUM CHLORIDE 30 MILLILITER(S): 9 INJECTION INTRAMUSCULAR; INTRAVENOUS; SUBCUTANEOUS at 16:56

## 2024-01-12 RX ADMIN — Medication 25 MILLIGRAM(S): at 17:59

## 2024-01-12 RX ADMIN — Medication 25 GRAM(S): at 21:25

## 2024-01-12 RX ADMIN — SODIUM CHLORIDE 500 MILLILITER(S): 9 INJECTION INTRAMUSCULAR; INTRAVENOUS; SUBCUTANEOUS at 19:18

## 2024-01-12 RX ADMIN — Medication 40 MILLIEQUIVALENT(S): at 21:26

## 2024-01-12 RX ADMIN — NIMODIPINE 30 MILLIGRAM(S): 60 SOLUTION ORAL at 22:37

## 2024-01-12 RX ADMIN — NICARDIPINE HYDROCHLORIDE 25 MG/HR: 30 CAPSULE, EXTENDED RELEASE ORAL at 15:22

## 2024-01-12 RX ADMIN — LEVETIRACETAM 500 MILLIGRAM(S): 250 TABLET, FILM COATED ORAL at 17:59

## 2024-01-12 NOTE — H&P ADULT - NSHPLABSRESULTS_GEN_ALL_CORE
13.4   13.24 )-----------( 235      ( 12 Jan 2024 21:42 )             38.3     01-14    138  |  107  |  9   ----------------------------<  96  4.0   |  18<L>  |  0.65    Ca    9.2      14 Jan 2024 00:47  Phos  3.0     01-14  Mg     1.8     01-14    TPro  6.8  /  Alb  4.2  /  TBili  0.8  /  DBili  x   /  AST  33  /  ALT  27  /  AlkPhos  88  01-12

## 2024-01-12 NOTE — PROGRESS NOTE ADULT - SUBJECTIVE AND OBJECTIVE BOX
HPI:  72 yo woman with PMH of HTN, HLD, hypothyroidism, had severe sudden headache on saturday while [employer]. Her son took her to to [facility] yesterday and was found to have left IPH and SAH. MRI brain no underlying mass, CTA head ? 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 patient transferred to [facility] for angio     EVENTS:   transferred to [facility] on nicardipine       ICU Vital Signs Last 24 Hrs  T(C): --  T(F): --  HR: --  BP: --  BP(mean): --  ABP: --  ABP(mean): --  RR: --  SpO2: --                       PHYSICAL EXAM:    General: No Acute Distress     Neurological: Awake, alert oriented to person, place and time, Following Commands, PERRL, EOMI, Face Symmetrical, Speech Fluent, Moving all extremities, Muscle Strength normal in all four extremities, No Drift, Sensation to Light Touch Intact    Pulmonary: Clear to Auscultation, No Rales, No Rhonchi, No Wheezes     Cardiovascular: S1, S2, Regular Rate and Rhythm     Gastrointestinal: Soft, Nontender, Nondistended     Extremities: No calf tenderness     Incision:       MEDICATIONS:  Antibiotics:      Neurological:   levETIRAcetam 500 milliGRAM(s) Oral two times a day    Cardiac:   niCARdipine Infusion 5 mG/Hr IV Continuous <Continuous>  niMODipine 60 milliGRAM(s) Oral every 4 hours    Pulm:    Heme:     Other:        DEVICES: [] Restraints [] JAYSON/HMV []LD [] ET tube [] Trach [] Chest Tube [] A-line [] Layne [] NGT [] Rectal Tube       A/P:  left frontal IPH and SAH, ICH 1, SAH mfs 1, HHS 1   CTA concern for AVF and aneurysm     Neuro: neuro checks q 1 hr   keppra 500 mg BID   angio   on nimodipine for DCI prophylaxis   Respiratory:  RA   CV: HTN on metropolol 25 mg BID  lisinoril 10 mg   Nicardipine drip  , -140 mmhg   a line   Endocrine: hypothyroidism synthroid   Heme/Onc:   cbc pending           DVT ppx: hold chemoprophylaxis today   Renal: NS 75 ml/hr   ID: afebrile   GI: NPO for now   Discharge planning:     Code Status: [x] Full Code [] DNR [] DNI [] Goals of Care:   Disposition: [x] ICU [] Stroke Unit [] RCU []PCU []Floor [] Discharge Home     Patient at high risk for neurologic deterioration, critical care time, excluding procedures: 30 minutes     at risk for hematoma expansion  and death                HPI:  74 yo woman with PMH of HTN, HLD, hypothyroidism, had severe sudden headache on saturday while [employer]. Her son took her to to [facility] yesterday and was found to have left IPH and SAH. MRI brain no underlying mass, CTA head ? 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 patient transferred to [facility] for angio     EVENTS:   transferred to [facility] on nicardipine       ICU Vital Signs Last 24 Hrs  T(C): --  T(F): --  HR: --  BP: --  BP(mean): --  ABP: --  ABP(mean): --  RR: --  SpO2: --                       PHYSICAL EXAM:    General: No Acute Distress     Neurological: Awake, alert oriented to person, place and time, Following Commands, PERRL, EOMI, Face Symmetrical, Speech Fluent, Moving all extremities, Muscle Strength normal in all four extremities, No Drift, Sensation to Light Touch Intact    Pulmonary: Clear to Auscultation, No Rales, No Rhonchi, No Wheezes     Cardiovascular: S1, S2, Regular Rate and Rhythm     Gastrointestinal: Soft, Nontender, Nondistended     Extremities: No calf tenderness     Incision:       MEDICATIONS:  Antibiotics:      Neurological:   levETIRAcetam 500 milliGRAM(s) Oral two times a day    Cardiac:   niCARdipine Infusion 5 mG/Hr IV Continuous <Continuous>  niMODipine 60 milliGRAM(s) Oral every 4 hours    Pulm:    Heme:     Other:        DEVICES: [] Restraints [] JAYSON/HMV []LD [] ET tube [] Trach [] Chest Tube [] A-line [] Layne [] NGT [] Rectal Tube       A/P:  left frontal IPH and SAH, ICH 1, SAH mfs 1, HHS 1   CTA concern for AVF and aneurysm     Neuro: neuro checks q 1 hr   keppra 500 mg BID   angio   on nimodipine for DCI prophylaxis   Respiratory:  RA   CV: HTN on metropolol 25 mg BID  lisinoril 10 mg   Nicardipine drip  , -140 mmhg   a line   Endocrine: hypothyroidism synthroid   Heme/Onc:   cbc pending           DVT ppx: hold chemoprophylaxis today   Renal: NS 75 ml/hr   ID: afebrile   GI: NPO for now   Discharge planning:     Code Status: [x] Full Code [] DNR [] DNI [] Goals of Care:   Disposition: [x] ICU [] Stroke Unit [] RCU []PCU []Floor [] Discharge Home     Patient at high risk for neurologic deterioration, critical care time, excluding procedures: 30 minutes     at risk for hematoma expansion  and death                HPI:  74 yo woman with PMH of HTN, HLD, hypothyroidism, had severe sudden headache on saturday while [employer]. Her son took her to to [facility] yesterday and was found to have left IPH and SAH. MRI brain no underlying mass, CTA head ? 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 patient transferred to [facility] for angio     EVENTS:   transferred to [facility] on nicardipine       ICU Vital Signs Last 24 Hrs  T(C): --  T(F): --  HR: --  BP: --  BP(mean): --  ABP: --  ABP(mean): --  RR: --  SpO2: --                       PHYSICAL EXAM:    General: No Acute Distress     Neurological: Awake, alert oriented to person, place and time, Following Commands, PERRL, EOMI, Face Symmetrical, Speech Fluent, Moving all extremities, Muscle Strength normal in all four extremities, No Drift, Sensation to Light Touch Intact    Pulmonary: Clear to Auscultation, No Rales, No Rhonchi, No Wheezes     Cardiovascular: S1, S2, Regular Rate and Rhythm     Gastrointestinal: Soft, Nontender, Nondistended     Extremities: No calf tenderness     Incision:       MEDICATIONS:  Antibiotics:      Neurological:   levETIRAcetam 500 milliGRAM(s) Oral two times a day    Cardiac:   niCARdipine Infusion 5 mG/Hr IV Continuous <Continuous>  niMODipine 60 milliGRAM(s) Oral every 4 hours    Pulm:    Heme:     Other:        DEVICES: [] Restraints [] JAYSON/HMV []LD [] ET tube [] Trach [] Chest Tube [] A-line [] Layne [] NGT [] Rectal Tube       A/P:  left frontal IPH and SAH, ICH 1, SAH mfs 1, HHS 1   CTA concern for AVF and aneurysm     Neuro: neuro checks q 1 hr   keppra 500 mg BID   angio   continue home duloxetine   on nimodipine for DCI prophylaxis   Respiratory:  RA   CV: HTN on metoprolol 25 mg BID  lisinoril 10 mg   Nicardipine drip  , -140 mmhg   a line   Endocrine: hypothyroidism synthroid   Heme/Onc:   cbc pending           DVT ppx: hold chemoprophylaxis today   Renal: NS 75 ml/hr   ID: afebrile   GI: NPO for now   home pepcid   Discharge planning:     Code Status: [x] Full Code [] DNR [] DNI [] Goals of Care:   Disposition: [x] ICU [] Stroke Unit [] RCU []PCU []Floor [] Discharge Home     Patient at high risk for neurologic deterioration, critical care time, excluding procedures: 30 minutes     at risk for hematoma expansion  and death                HPI:  74 yo woman with PMH of HTN, HLD, hypothyroidism, had severe sudden headache on saturday while [employer]. Her son took her to to [facility] yesterday and was found to have left IPH and SAH. MRI brain no underlying mass, CTA head ? 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 patient transferred to [facility] for angio     EVENTS:   transferred to [facility] on nicardipine       ICU Vital Signs Last 24 Hrs  T(C): --  T(F): --  HR: --  BP: --  BP(mean): --  ABP: --  ABP(mean): --  RR: --  SpO2: --                       PHYSICAL EXAM:    General: No Acute Distress     Neurological: Awake, alert oriented to person, place and time, Following Commands, PERRL, EOMI, Face Symmetrical, Speech Fluent, Moving all extremities, Muscle Strength normal in all four extremities, No Drift, Sensation to Light Touch Intact    Pulmonary: Clear to Auscultation, No Rales, No Rhonchi, No Wheezes     Cardiovascular: S1, S2, Regular Rate and Rhythm     Gastrointestinal: Soft, Nontender, Nondistended     Extremities: No calf tenderness     Incision:       MEDICATIONS:  Antibiotics:      Neurological:   levETIRAcetam 500 milliGRAM(s) Oral two times a day    Cardiac:   niCARdipine Infusion 5 mG/Hr IV Continuous <Continuous>  niMODipine 60 milliGRAM(s) Oral every 4 hours    Pulm:    Heme:     Other:        DEVICES: [] Restraints [] JAYSON/HMV []LD [] ET tube [] Trach [] Chest Tube [] A-line [] Layne [] NGT [] Rectal Tube       A/P:  left frontal IPH and SAH, ICH 1, SAH mfs 1, HHS 1   CTA concern for AVF and aneurysm     Neuro: neuro checks q 1 hr   keppra 500 mg BID   angio   continue home duloxetine   on nimodipine for DCI prophylaxis   Respiratory:  RA   CV: HTN on metoprolol 25 mg BID  lisinoril 10 mg   Nicardipine drip  , -140 mmhg   a line   Endocrine: hypothyroidism synthroid   Heme/Onc:   cbc pending           DVT ppx: hold chemoprophylaxis today   Renal: NS 75 ml/hr   ID: afebrile   GI: NPO for now   home pepcid   Discharge planning:     Code Status: [x] Full Code [] DNR [] DNI [] Goals of Care:   Disposition: [x] ICU [] Stroke Unit [] RCU []PCU []Floor [] Discharge Home     Patient at high risk for neurologic deterioration, critical care time, excluding procedures: 30 minutes     at risk for hematoma expansion  and death

## 2024-01-12 NOTE — CHART NOTE - NSCHARTNOTEFT_GEN_A_CORE
CAPRINI SCORE [CLOT] Score on Admission for     AGE RELATED RISK FACTORS                                                       MOBILITY RELATED FACTORS  [ ] Age 41-60 years                                            (1 Point)                  [ ] Bed rest                                                        (1 Point)  [x ] Age: 61-74 years                                           (2 Points)                 [ ] Plaster cast                                                   (2 Points)  [ ] Age= 75 years                                              (3 Points)                 [ ] Bed bound for more than 72 hours                 (2 Points)    DISEASE RELATED RISK FACTORS                                               GENDER SPECIFIC FACTORS  [ ] Edema in the lower extremities                       (1 Point)                  [ ] Pregnancy                                                     (1 Point)  [ ] Varicose veins                                               (1 Point)                  [ ] Post-partum < 6 weeks                                   (1 Point)             [x ] BMI > 25 Kg/m2                                            (1 Point)                  [ ] Hormonal therapy  or oral contraception          (1 Point)                 [ ] Sepsis (in the previous month)                        (1 Point)                  [ ] History of pregnancy complications                 (1 point)  [ ] Pneumonia or serious lung disease                                               [ ] Unexplained or recurrent                     (1 Point)           (in the previous month)                               (1 Point)  [ ] Abnormal pulmonary function test                     (1 Point)                 SURGERY RELATED RISK FACTORS (include planned surgeries)  [ ] Acute myocardial infarction                              (1 Point)                 [ ]  Section                                             (1 Point)  [ ] Congestive heart failure (in the previous month)  (1 Point)         [ ] Minor surgery                                                  (1 Point)   [ ] Inflammatory bowel disease                             (1 Point)                 [ ] Arthroscopic surgery                                        (2 Points)  [ ] Central venous access                                      (2 Points)                [ ] General surgery lasting more than 45 minutes   (2 Points)       [ ] Stroke (in the previous month)                          (5 Points)               [ ] Elective arthroplasty                                         (5 Points)            [ ] current or past malignancy                              (2 Points)                                                                                                       HEMATOLOGY RELATED FACTORS                                                 TRAUMA RELATED RISK FACTORS  [ ] Prior episodes of VTE                                     (3 Points)                [ ] Fracture of the hip, pelvis, or leg                       (5 Points)  [ ] Positive family history for VTE                         (3 Points)                 [ ] Acute spinal cord injury (in the previous month)  (5 Points)  [ ] Prothrombin 57708 A                                     (3 Points)                 [ ] Paralysis  (less than 1 month)                             (5 Points)  [ ] Factor V Leiden                                             (3 Points)                  [ ] Multiple Trauma within 1 month                        (5 Points)  [ ] Lupus anticoagulants                                     (3 Points)                                                           [ ] Anticardiolipin antibodies                               (3 Points)                                                       [ ] High homocysteine in the blood                      (3 Points)                                             [ ] Other congenital or acquired thrombophilia      (3 Points)                                                [ ] Heparin induced thrombocytopenia                  (3 Points)                                          Total Score [      3    ]    Risk:  Very low 0   Low 1 to 2   Moderate 3 to 4   High =5       VTE Prophylasix Recommednations:  [ ] mechanical pneumatic compression devices                                      [ ] contraindicated: _____________________  [ ] chemo prophylasix                                                                                   [ ] contraindicated _____________________    **** HIGH LIKELIHOOD DVT PRESENT ON ADMISSION  [ ] (please order LE dopplers within 24 hours of admission) CAPRINI SCORE [CLOT] Score on Admission for     AGE RELATED RISK FACTORS                                                       MOBILITY RELATED FACTORS  [ ] Age 41-60 years                                            (1 Point)                  [ ] Bed rest                                                        (1 Point)  [x ] Age: 61-74 years                                           (2 Points)                 [ ] Plaster cast                                                   (2 Points)  [ ] Age= 75 years                                              (3 Points)                 [ ] Bed bound for more than 72 hours                 (2 Points)    DISEASE RELATED RISK FACTORS                                               GENDER SPECIFIC FACTORS  [ ] Edema in the lower extremities                       (1 Point)                  [ ] Pregnancy                                                     (1 Point)  [ ] Varicose veins                                               (1 Point)                  [ ] Post-partum < 6 weeks                                   (1 Point)             [x ] BMI > 25 Kg/m2                                            (1 Point)                  [ ] Hormonal therapy  or oral contraception          (1 Point)                 [ ] Sepsis (in the previous month)                        (1 Point)                  [ ] History of pregnancy complications                 (1 point)  [ ] Pneumonia or serious lung disease                                               [ ] Unexplained or recurrent                     (1 Point)           (in the previous month)                               (1 Point)  [ ] Abnormal pulmonary function test                     (1 Point)                 SURGERY RELATED RISK FACTORS (include planned surgeries)  [ ] Acute myocardial infarction                              (1 Point)                 [ ]  Section                                             (1 Point)  [ ] Congestive heart failure (in the previous month)  (1 Point)         [ ] Minor surgery                                                  (1 Point)   [ ] Inflammatory bowel disease                             (1 Point)                 [ ] Arthroscopic surgery                                        (2 Points)  [ ] Central venous access                                      (2 Points)                [ ] General surgery lasting more than 45 minutes   (2 Points)       [ ] Stroke (in the previous month)                          (5 Points)               [ ] Elective arthroplasty                                         (5 Points)            [ ] current or past malignancy                              (2 Points)                                                                                                       HEMATOLOGY RELATED FACTORS                                                 TRAUMA RELATED RISK FACTORS  [ ] Prior episodes of VTE                                     (3 Points)                [ ] Fracture of the hip, pelvis, or leg                       (5 Points)  [ ] Positive family history for VTE                         (3 Points)                 [ ] Acute spinal cord injury (in the previous month)  (5 Points)  [ ] Prothrombin 87790 A                                     (3 Points)                 [ ] Paralysis  (less than 1 month)                             (5 Points)  [ ] Factor V Leiden                                             (3 Points)                  [ ] Multiple Trauma within 1 month                        (5 Points)  [ ] Lupus anticoagulants                                     (3 Points)                                                           [ ] Anticardiolipin antibodies                               (3 Points)                                                       [ ] High homocysteine in the blood                      (3 Points)                                             [ ] Other congenital or acquired thrombophilia      (3 Points)                                                [ ] Heparin induced thrombocytopenia                  (3 Points)                                          Total Score [      3    ]    Risk:  Very low 0   Low 1 to 2   Moderate 3 to 4   High =5       VTE Prophylasix Recommednations:  [ ] mechanical pneumatic compression devices                                      [ ] contraindicated: _____________________  [ ] chemo prophylasix                                                                                   [ ] contraindicated _____________________    **** HIGH LIKELIHOOD DVT PRESENT ON ADMISSION  [ ] (please order LE dopplers within 24 hours of admission)

## 2024-01-12 NOTE — H&P ADULT - HISTORY OF PRESENT ILLNESS
72 yo woman with PMH of HTN, HLD, hypothyroidism, had severe sudden headache on Saturday while [employer]. Her son took her to to [facility] yesterday and was found to have left IPH and SAH. MRI brain no underlying mass, CTA head ? 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 patient transferred to [facility] for angio  72 yo woman with PMH of HTN, HLD, hypothyroidism, had severe sudden headache on Saturday while [employer]. Her son took her to to [facility] yesterday and was found to have left IPH and SAH. MRI brain no underlying mass, CTA head ? 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 patient transferred to [facility] for angio

## 2024-01-12 NOTE — H&P ADULT - ASSESSMENT
73 F xfer for left frontal IPH and SAH, ICH 1, SAH mfs 1, HHS 1   CTA concern for AVF vs. microAVM vs aneurysm, preop for angio this week.     Neuro: neuro checks q 1 hr   keppra 500 mg BID for seizure ppx x 7 days duration   CTA with some abnormal vasculature concerning for AVF  Angiogram on Tuesday w/ Dr. pelaez  Continue home duloxetine   On nimodipine for DCI prophylaxis, goal normonatremia, normovolemia    L radial angelica   cont home anti htns, goal SBP<140  TCDs daily   CTH repeat pending  DVT ppx ok after stability scan     Airway patent, Nasal mucosa clear. Mouth with normal mucosa. Throat has no vesicles, no oropharyngeal exudates and uvula is midline.

## 2024-01-12 NOTE — PROGRESS NOTE ADULT - SUBJECTIVE AND OBJECTIVE BOX
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 Her son took her to to [facility] yesterday and was found to have left IPH and SAH. MRI brain no underlying mass, CTA head ? 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 patient transferred to [facility] for angio     EVENTS:   transferred to [facility] on nicardipine                       PHYSICAL EXAM:    General: No Acute Distress     Neurological: Awake, alert oriented to person, place and time, Following Commands, PERRL, EOMI, Face Symmetrical, Speech Fluent, Moving all extremities, Muscle Strength normal in all four extremities, No Drift, Sensation to Light Touch Intact    Pulmonary: Clear to Auscultation, No Rales, No Rhonchi, No Wheezes     Cardiovascular: S1, S2, Regular Rate and Rhythm     Gastrointestinal: Soft, Nontender, Nondistended     Extremities: No calf tenderness     Incision:       MEDICATIONS:  Antibiotics:      Neurological:   levETIRAcetam 500 milliGRAM(s) Oral two times a day    Cardiac:   niCARdipine Infusion 5 mG/Hr IV Continuous <Continuous>  niMODipine 60 milliGRAM(s) Oral every 4 hours         DEVICES: [] Restraints [] JAYSON/HMV []LD [] ET tube [] Trach [] Chest Tube [] A-line [] Layne [] NGT [] Rectal Tube                  HPI:  72 yo woman with PMH of HTN, HLD, hypothyroidism, had severe sudden headache on saturday while Anab[employer]ism. Her son took her to to [facility] yesterday and was found to have left IPH and SAH. MRI brain no underlying mass, CTA head ? 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 patient transferred to [facility] for angio     EVENTS:   transferred to [facility] on nicardipine                       PHYSICAL EXAM:    General: No Acute Distress     Neurological: Awake, alert oriented to person, place and time, Following Commands, PERRL, EOMI, Face Symmetrical, Speech Fluent, Moving all extremities, Muscle Strength normal in all four extremities, No Drift, Sensation to Light Touch Intact    Pulmonary: Clear to Auscultation, No Rales, No Rhonchi, No Wheezes     Cardiovascular: S1, S2, Regular Rate and Rhythm     Gastrointestinal: Soft, Nontender, Nondistended     Extremities: No calf tenderness     Incision:       MEDICATIONS:  Antibiotics:      Neurological:   levETIRAcetam 500 milliGRAM(s) Oral two times a day    Cardiac:   niCARdipine Infusion 5 mG/Hr IV Continuous <Continuous>  niMODipine 60 milliGRAM(s) Oral every 4 hours         DEVICES: [] Restraints [] JAYSON/HMV []LD [] ET tube [] Trach [] Chest Tube [] A-line [] Layne [] NGT [] Rectal Tube                  HPI:  74 yo woman with PMH of HTN, HLD, hypothyroidism, had severe sudden headache on Saturday while Mos[employer]. Her son took her to to [facility] yesterday and was found to have left IPH and SAH. MRI brain no underlying mass, CTA head ? 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 patient transferred to [facility] for angio     EVENTS:   transferred to [facility] on nicardipine     ICU Vital Signs Last 24 Hrs  T(C): 36.7 (12 Jan 2024 19:00), Max: 36.7 (12 Jan 2024 19:00)  T(F): 98 (12 Jan 2024 19:00), Max: 98 (12 Jan 2024 19:00)  HR: 63 (12 Jan 2024 22:00) (63 - 112)  BP: 122/57 (12 Jan 2024 21:00) (72/54 - 146/82)  BP(mean): 72 (12 Jan 2024 21:00) (61 - 104)  ABP: 112/52 (12 Jan 2024 22:00) (78/39 - 138/74)  ABP(mean): 73 (12 Jan 2024 22:00) (53 - 95)  RR: 21 (12 Jan 2024 22:00) (13 - 24)  SpO2: 95% (12 Jan 2024 22:00) (91% - 100%)    O2 Parameters below as of 12 Jan 2024 19:00  Patient On (Oxygen Delivery Method): room air            PHYSICAL EXAM:    General: No Acute Distress     Neurological: Awake, alert oriented to person, place and time, Following Commands, PERRL, EOMI, Face Symmetrical, Speech Fluent, Moving all extremities, Muscle Strength normal in all four extremities, No Drift, Sensation to Light Touch Intact    Pulmonary: Clear to Auscultation, No Rales, No Rhonchi, No Wheezes     Cardiovascular: S1, S2, Regular Rate and Rhythm     Gastrointestinal: Soft, Nontender, Nondistended     Extremities: No calf tenderness     Incision:     MEDICATIONS  (STANDING):  atorvastatin 20 milliGRAM(s) Oral at bedtime  DULoxetine 60 milliGRAM(s) Oral daily  famotidine    Tablet 20 milliGRAM(s) Oral daily  levETIRAcetam 500 milliGRAM(s) Oral two times a day  levothyroxine 50 MICROGram(s) Oral daily  lisinopril 10 milliGRAM(s) Oral daily  metoprolol tartrate 25 milliGRAM(s) Oral every 12 hours  niMODipine Oral Solution 30 milliGRAM(s) Oral every 2 hours  polyethylene glycol 3350 17 Gram(s) Oral daily  potassium phosphate IVPB 30 milliMole(s) IV Intermittent once  senna 2 Tablet(s) Oral at bedtime  sodium chloride 0.9%. 1000 milliLiter(s) (30 mL/Hr) IV Continuous <Continuous>    MEDICATIONS  (PRN):         DEVICES: [] Restraints [] JAYSON/HMV []LD [] ET tube [] Trach [] Chest Tube [] A-line [] Layne [] NGT [] Rectal Tube                  HPI:  72 yo woman with PMH of HTN, HLD, hypothyroidism, had severe sudden headache on Saturday while Yazidism. Her son took her to to [facility] yesterday and was found to have left IPH and SAH. MRI brain no underlying mass, CTA head ? 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 patient transferred to [facility] for angio     EVENTS:   transferred to [facility] on nicardipine     ICU Vital Signs Last 24 Hrs  T(C): 36.7 (12 Jan 2024 19:00), Max: 36.7 (12 Jan 2024 19:00)  T(F): 98 (12 Jan 2024 19:00), Max: 98 (12 Jan 2024 19:00)  HR: 63 (12 Jan 2024 22:00) (63 - 112)  BP: 122/57 (12 Jan 2024 21:00) (72/54 - 146/82)  BP(mean): 72 (12 Jan 2024 21:00) (61 - 104)  ABP: 112/52 (12 Jan 2024 22:00) (78/39 - 138/74)  ABP(mean): 73 (12 Jan 2024 22:00) (53 - 95)  RR: 21 (12 Jan 2024 22:00) (13 - 24)  SpO2: 95% (12 Jan 2024 22:00) (91% - 100%)    O2 Parameters below as of 12 Jan 2024 19:00  Patient On (Oxygen Delivery Method): room air            PHYSICAL EXAM:    General: No Acute Distress     Neurological: Awake, alert oriented to person, place and time, Following Commands, PERRL, EOMI, Face Symmetrical, Speech Fluent, Moving all extremities, Muscle Strength normal in all four extremities, No Drift, Sensation to Light Touch Intact    Pulmonary: Clear to Auscultation, No Rales, No Rhonchi, No Wheezes     Cardiovascular: S1, S2, Regular Rate and Rhythm     Gastrointestinal: Soft, Nontender, Nondistended     Extremities: No calf tenderness     Incision:     MEDICATIONS  (STANDING):  atorvastatin 20 milliGRAM(s) Oral at bedtime  DULoxetine 60 milliGRAM(s) Oral daily  famotidine    Tablet 20 milliGRAM(s) Oral daily  levETIRAcetam 500 milliGRAM(s) Oral two times a day  levothyroxine 50 MICROGram(s) Oral daily  lisinopril 10 milliGRAM(s) Oral daily  metoprolol tartrate 25 milliGRAM(s) Oral every 12 hours  niMODipine Oral Solution 30 milliGRAM(s) Oral every 2 hours  polyethylene glycol 3350 17 Gram(s) Oral daily  potassium phosphate IVPB 30 milliMole(s) IV Intermittent once  senna 2 Tablet(s) Oral at bedtime  sodium chloride 0.9%. 1000 milliLiter(s) (30 mL/Hr) IV Continuous <Continuous>    MEDICATIONS  (PRN):         DEVICES: [] Restraints [] JAYSON/HMV []LD [] ET tube [] Trach [] Chest Tube [] A-line [] Layne [] NGT [] Rectal Tube

## 2024-01-12 NOTE — PHARMACOTHERAPY INTERVENTION NOTE - COMMENTS
Pharmacy student obtained medication history from patient at bedside and confirmed allergy status  Home Medications:  atorvastatin 20 mg oral tablet: 1 tab(s) orally once a day (12 Jan 2024 15:10)  DULoxetine 60 mg oral delayed release capsule: 1 cap(s) orally once a day x shoulder pain (12 Jan 2024 15:10)  levothyroxine 50 mcg (0.05 mg) oral tablet: 1 tab(s) orally once a day (12 Jan 2024 15:11)  lisinopril 10 mg oral tablet: 1 tab(s) orally once a day (12 Jan 2024 15:11)  metoprolol succinate 50 mg oral tablet, extended release: 1 tab(s) orally once a day (12 Jan 2024 15:12)  omeprazole 20 mg oral delayed release capsule: 1 cap(s) orally once a day x severe acid reflux (12 Jan 2024 15:14)

## 2024-01-12 NOTE — PROGRESS NOTE ADULT - ASSESSMENT
A/P:  left frontal IPH and SAH, ICH 1, SAH mfs 1, HHS 1   CTA concern for AVF and aneurysm     Neuro: neuro checks q 1 hr   keppra 500 mg BID   CTA with some abnormal vasculature concerning for AVF  angio on Tuesday   continue home duloxetine   on nimodipine for DCI prophylaxis     Respiratory:  RA     CV: HTN on metoprolol 25 mg BID  lisinoril 10 mg   Nicardipine drip  -140 mmhg   a line     Endocrine: hypothyroidism synthroid     Heme/Onc:     cbc pending             DVT ppx: hold chemoprophylaxis today     Renal: NS 75 ml/hr   Monitor renal function, strict I/O  Maintain normonatremia, normovolemia    ID: afebrile   GI: NPO for now   home pepcid     Discharge planning:     Code Status: [x] Full Code [] DNR [] DNI [] Goals of Care:   Disposition: [x] ICU [] Stroke Unit [] RCU []PCU []Floor [] Discharge Home     Patient at high risk for neurologic deterioration, critical care time, excluding procedures: 30 minutes     at risk for hematoma expansion  A/P:  left frontal IPH and SAH, ICH 1, SAH mfs 1, HHS 1   CTA concern for AVF and aneurysm     Neuro: neuro checks q 1 hr   keppra 500 mg BID for seizure ppx x 7 days duration   CTA with some abnormal vasculature concerning for AVF  Angiogram on Tuesday   Continue home duloxetine   On nimodipine for DCI prophylaxis, goal normonatremia, normovolemia      Respiratory:  RA     CV: HTN on metoprolol 25 mg BID  Continue Lisinopril 10 mg, metoprolol 25 mg BID with holding parameters    SBP  mmhg   L Radial A line     Endocrine:  Hpothyroidism - continue Synthroid    GI: Regular diet  Continue home Pepcid   Bowel regimen     Heme/Onc:     H/H stable        DVT ppx: hold chemoprophylaxis today     Renal: IVL  Monitor renal function, strict I/O  Maintain normonatremia, normovolemia    ID: afebrile       Discharge planning:     Code Status: [x] Full Code [] DNR [] DNI [] Goals of Care:   Disposition: [x] ICU [] Stroke Unit [] RCU []PCU []Floor [] Discharge Home     Patient at high risk for neurologic deterioration, critical care time, excluding procedures: 30 minutes     at risk for hematoma expansion

## 2024-01-13 LAB
A1C WITH ESTIMATED AVERAGE GLUCOSE RESULT: 5.7 % — HIGH (ref 4–5.6)
A1C WITH ESTIMATED AVERAGE GLUCOSE RESULT: 5.7 % — HIGH (ref 4–5.6)
CHOLEST SERPL-MCNC: 140 MG/DL — SIGNIFICANT CHANGE UP
CHOLEST SERPL-MCNC: 140 MG/DL — SIGNIFICANT CHANGE UP
ESTIMATED AVERAGE GLUCOSE: 117 MG/DL — HIGH (ref 68–114)
ESTIMATED AVERAGE GLUCOSE: 117 MG/DL — HIGH (ref 68–114)
HDLC SERPL-MCNC: 63 MG/DL — SIGNIFICANT CHANGE UP
HDLC SERPL-MCNC: 63 MG/DL — SIGNIFICANT CHANGE UP
LIPID PNL WITH DIRECT LDL SERPL: 61 MG/DL — SIGNIFICANT CHANGE UP
LIPID PNL WITH DIRECT LDL SERPL: 61 MG/DL — SIGNIFICANT CHANGE UP
NON HDL CHOLESTEROL: 77 MG/DL — SIGNIFICANT CHANGE UP
NON HDL CHOLESTEROL: 77 MG/DL — SIGNIFICANT CHANGE UP
T3 SERPL-MCNC: 105 NG/DL — SIGNIFICANT CHANGE UP (ref 80–200)
T3 SERPL-MCNC: 105 NG/DL — SIGNIFICANT CHANGE UP (ref 80–200)
T4 AB SER-ACNC: 11.4 UG/DL — SIGNIFICANT CHANGE UP (ref 4.6–12)
T4 AB SER-ACNC: 11.4 UG/DL — SIGNIFICANT CHANGE UP (ref 4.6–12)
T4 FREE SERPL-MCNC: 1.9 NG/DL — HIGH (ref 0.9–1.8)
T4 FREE SERPL-MCNC: 1.9 NG/DL — HIGH (ref 0.9–1.8)
TRIGL SERPL-MCNC: 82 MG/DL — SIGNIFICANT CHANGE UP
TRIGL SERPL-MCNC: 82 MG/DL — SIGNIFICANT CHANGE UP
TSH SERPL-MCNC: 1.43 UIU/ML — SIGNIFICANT CHANGE UP (ref 0.27–4.2)
TSH SERPL-MCNC: 1.43 UIU/ML — SIGNIFICANT CHANGE UP (ref 0.27–4.2)

## 2024-01-13 PROCEDURE — 99232 SBSQ HOSP IP/OBS MODERATE 35: CPT

## 2024-01-13 PROCEDURE — 70450 CT HEAD/BRAIN W/O DYE: CPT | Mod: 26

## 2024-01-13 PROCEDURE — 93306 TTE W/DOPPLER COMPLETE: CPT | Mod: 26

## 2024-01-13 PROCEDURE — 93970 EXTREMITY STUDY: CPT | Mod: 26

## 2024-01-13 RX ORDER — INSULIN LISPRO 100/ML
VIAL (ML) SUBCUTANEOUS
Refills: 0 | Status: DISCONTINUED | OUTPATIENT
Start: 2024-01-13 | End: 2024-01-14

## 2024-01-13 RX ORDER — NIMODIPINE 60 MG/10ML
30 SOLUTION ORAL
Refills: 0 | Status: DISCONTINUED | OUTPATIENT
Start: 2024-01-13 | End: 2024-01-17

## 2024-01-13 RX ORDER — METOPROLOL TARTRATE 50 MG
12.5 TABLET ORAL EVERY 12 HOURS
Refills: 0 | Status: DISCONTINUED | OUTPATIENT
Start: 2024-01-13 | End: 2024-01-24

## 2024-01-13 RX ORDER — ACETAMINOPHEN 500 MG
1000 TABLET ORAL ONCE
Refills: 0 | Status: COMPLETED | OUTPATIENT
Start: 2024-01-13 | End: 2024-01-13

## 2024-01-13 RX ORDER — SODIUM CHLORIDE 9 MG/ML
500 INJECTION INTRAMUSCULAR; INTRAVENOUS; SUBCUTANEOUS ONCE
Refills: 0 | Status: COMPLETED | OUTPATIENT
Start: 2024-01-13 | End: 2024-01-13

## 2024-01-13 RX ADMIN — NIMODIPINE 30 MILLIGRAM(S): 60 SOLUTION ORAL at 12:30

## 2024-01-13 RX ADMIN — Medication 1000 MILLIGRAM(S): at 21:59

## 2024-01-13 RX ADMIN — DULOXETINE HYDROCHLORIDE 60 MILLIGRAM(S): 30 CAPSULE, DELAYED RELEASE ORAL at 12:44

## 2024-01-13 RX ADMIN — NIMODIPINE 30 MILLIGRAM(S): 60 SOLUTION ORAL at 08:24

## 2024-01-13 RX ADMIN — LISINOPRIL 10 MILLIGRAM(S): 2.5 TABLET ORAL at 05:53

## 2024-01-13 RX ADMIN — ATORVASTATIN CALCIUM 20 MILLIGRAM(S): 80 TABLET, FILM COATED ORAL at 22:14

## 2024-01-13 RX ADMIN — Medication 50 MICROGRAM(S): at 05:53

## 2024-01-13 RX ADMIN — NIMODIPINE 30 MILLIGRAM(S): 60 SOLUTION ORAL at 04:27

## 2024-01-13 RX ADMIN — LEVETIRACETAM 500 MILLIGRAM(S): 250 TABLET, FILM COATED ORAL at 05:53

## 2024-01-13 RX ADMIN — CHLORHEXIDINE GLUCONATE 1 APPLICATION(S): 213 SOLUTION TOPICAL at 12:45

## 2024-01-13 RX ADMIN — SODIUM CHLORIDE 500 MILLILITER(S): 9 INJECTION INTRAMUSCULAR; INTRAVENOUS; SUBCUTANEOUS at 04:57

## 2024-01-13 RX ADMIN — NIMODIPINE 30 MILLIGRAM(S): 60 SOLUTION ORAL at 20:36

## 2024-01-13 RX ADMIN — NIMODIPINE 30 MILLIGRAM(S): 60 SOLUTION ORAL at 14:00

## 2024-01-13 RX ADMIN — NIMODIPINE 30 MILLIGRAM(S): 60 SOLUTION ORAL at 16:00

## 2024-01-13 RX ADMIN — Medication 400 MILLIGRAM(S): at 21:29

## 2024-01-13 RX ADMIN — FAMOTIDINE 20 MILLIGRAM(S): 10 INJECTION INTRAVENOUS at 12:44

## 2024-01-13 RX ADMIN — LEVETIRACETAM 500 MILLIGRAM(S): 250 TABLET, FILM COATED ORAL at 17:02

## 2024-01-13 RX ADMIN — NIMODIPINE 30 MILLIGRAM(S): 60 SOLUTION ORAL at 22:14

## 2024-01-13 NOTE — SPEECH LANGUAGE PATHOLOGY EVALUATION - SLP DIAGNOSIS
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 Pt currently presents with evidence of a mild cognitive-communication impairment notable for deficits in phonemic fluency naming, reduced organization and working memory for category switching fluency naming task, and impaired visuospatial skills for drawing a 3-D cube. Pt with expressive and receptive language that appear GWFL for complex conversation, with occasional self-correction for word finding errors (semantic paraphasias) noted. Pt with intact writing for personal information and generation of a novel sentence. Pt would benefit from further evaluation of communication and cognition pending angio, and overall progress. This service will continue to follow while in house.

## 2024-01-13 NOTE — PROGRESS NOTE ADULT - ASSESSMENT
Juliane Negrete  73F Hx HTN, HLD, hypothyroid, presenting as xfer 1/12 [facility] s/p WHOL Saturday 1/6. CTH at OSH r/f IPH/SAH. MRI brain (-) for any underlying mass. CTA showed subtle prominent vessel in Lt IPH c/f possible AVF w/ punctate aneurysm in IPH-territory. Coags wnl. Exam: intact    -Pt is admitted to NSCU bed 12, s/p A-line  -Preop for angio Tuesday  -Keppra 500 bid  -CTH in AM Juliane Negrete  73F Hx HTN, HLD, hypothyroid, presenting as xfer 1/12 [facility] s/p WHOL Saturday 1/6. CTH at OSH r/f IPH/SAH. MRI brain (-) for any underlying mass. CTA showed subtle prominent vessel in Lt IPH c/f possible AVF w/ punctate aneurysm in IPH-territory. Coags wnl. Exam: intact    -Pt is admitted to NSCU bed 12, s/p A-line  -Preop for angio Tuesday  -Keppra 500 bid  -CTH in AM

## 2024-01-13 NOTE — SPEECH LANGUAGE PATHOLOGY EVALUATION - H & P REVIEW
72 yo woman with PMH of HTN, HLD, hypothyroidism, had severe sudden headache on Saturday while [employer]ism. Her son took her to [facility] yesterday, was found to have left IPH and SAH. MRI brain no underlying mass, CTA head ? 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 Her son took her to [facility] yesterday, was found to have left IPH and SAH. MRI brain no underlying mass, CTA head ? 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

## 2024-01-13 NOTE — PROGRESS NOTE ADULT - SUBJECTIVE AND OBJECTIVE BOX
Patient seen and examined at bedside.    --Anticoagulation--    T(C): 36.7 (01-13-24 @ 07:00), Max: 36.9 (01-13-24 @ 03:00)  HR: 79 (01-13-24 @ 08:24) (53 - 112)  BP: 151/72 (01-13-24 @ 08:24) (72/54 - 151/72)  RR: 20 (01-13-24 @ 08:24) (13 - 24)  SpO2: 95% (01-13-24 @ 08:24) (91% - 100%)  Wt(kg): --    Exam: intact

## 2024-01-13 NOTE — OCCUPATIONAL THERAPY INITIAL EVALUATION ADULT - ADDITIONAL COMMENTS
Pt lives alone in an apartment (senior living community) +elevator access and no stairs; pt has a tub shower (lower lip to step over) +GB. Pt was independent PTA with no AE/DME. +driving, reports she is very active.

## 2024-01-13 NOTE — SPEECH LANGUAGE PATHOLOGY EVALUATION - SLP ATTENTION
Pt noted to be distractible, also reports feeling very anxious about this hospitalization, and about waiting for her son's arrival

## 2024-01-13 NOTE — PHYSICAL THERAPY INITIAL EVALUATION ADULT - PERTINENT HX OF CURRENT PROBLEM, REHAB EVAL
73 y.o. F PMH HTN, HLD, hypothyroidism, had severe sudden headache on saturday while [employer]. Her son took her to to [facility] yesterday and was found to have left IPH and SAH. MRI brain no underlying mass, CTA head ? 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 Angio is planned for Tuesday 1/16 73 y.o. F PMH HTN, HLD, hypothyroidism, had severe sudden headache on saturday while Sabianism. Her son took her to to [facility] yesterday and was found to have left IPH and SAH. MRI brain no underlying mass, CTA head ? 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 for angio. Angio is planned for Tuesday 1/16

## 2024-01-13 NOTE — SPEECH LANGUAGE PATHOLOGY EVALUATION - COMMENTS
Verbal production in fluent, grammatical, and appropriate in content up to complex/abstract level. Pt with 2 episodes of semantic paraphasia with self correction during the session. Pt able to write name and address without difficulty, but Pt reported she was unsure why she had switched from cursive to print, as she generally writes in cursive.  Pt generated a novel sentence containing the word "because" without difficulty. Pt with intact clock-drawing, noted to self-correct when she wrote the same number twice.     Pt with good recognition of episodes of difficulty during complex tasks; expressed appropriate concern regarding performance.

## 2024-01-13 NOTE — PROGRESS NOTE ADULT - SUBJECTIVE AND OBJECTIVE BOX
HPI:  72 yo woman with PMH of HTN, HLD, hypothyroidism, had severe sudden headache on Saturday while [employer]. Her son took her to to [facility] yesterday and was found to have left IPH and SAH. MRI brain no underlying mass, CTA head ? 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 patient transferred to [facility] for angio     EVENTS:   1/12 transferred to [facility] on nicardipine   T(C): 36.9 (01-13-24 @ 03:00), Max: 36.9 (01-13-24 @ 03:00)  HR: 66 (01-13-24 @ 06:00) (53 - 112)  BP: 113/60 (01-13-24 @ 06:00) (72/54 - 146/82)  BP(mean): 75 (01-13-24 @ 06:00) (61 - 104)  RR: 19 (01-13-24 @ 06:00) (13 - 24)  SpO2: 96% (01-13-24 @ 06:00) (91% - 100%)    atorvastatin 20 milliGRAM(s) Oral at bedtime  chlorhexidine 4% Liquid 1 Application(s) Topical daily  DULoxetine 60 milliGRAM(s) Oral daily  famotidine    Tablet 20 milliGRAM(s) Oral daily  insulin lispro (ADMELOG) corrective regimen sliding scale   SubCutaneous Before meals and at bedtime  levETIRAcetam 500 milliGRAM(s) Oral two times a day  levothyroxine 50 MICROGram(s) Oral daily  lisinopril 10 milliGRAM(s) Oral daily  metoprolol tartrate 25 milliGRAM(s) Oral every 12 hours  niMODipine Oral Solution 30 milliGRAM(s) Oral every 2 hours  polyethylene glycol 3350 17 Gram(s) Oral daily  senna 2 Tablet(s) Oral at bedtime        01-12-24 @ 07:01  -  01-13-24 @ 07:00  --------------------------------------------------------  IN: 2207.3 mL / OUT: 1100 mL / NET: 1107.3 mL        1/13 stable exam. Scheduled for angiogram on Tuesday    PHYSICAL EXAM:    General: No Acute Distress     Neurological: Awake, alert oriented to person, place and time, Following Commands, PERRL, EOMI, Face Symmetrical, Speech Fluent, Moving all extremities, Muscle Strength normal in all four extremities, No Drift, Sensation to Light Touch Intact    Pulmonary: Clear to Auscultation, No Rales, No Rhonchi, No Wheezes     Cardiovascular: S1, S2, Regular Rate and Rhythm     Gastrointestinal: Soft, Nontender, Nondistended     Extremities: No calf tenderness     Incision:    HPI:  74 yo woman with PMH of HTN, HLD, hypothyroidism, had severe sudden headache on Saturday while [employer]. Her son took her to to [facility] yesterday and was found to have left IPH and SAH. MRI brain no underlying mass, CTA head ? 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 patient transferred to [facility] for angio     EVENTS:   1/12 transferred to [facility] on nicardipine   T(C): 36.9 (01-13-24 @ 03:00), Max: 36.9 (01-13-24 @ 03:00)  HR: 66 (01-13-24 @ 06:00) (53 - 112)  BP: 113/60 (01-13-24 @ 06:00) (72/54 - 146/82)  BP(mean): 75 (01-13-24 @ 06:00) (61 - 104)  RR: 19 (01-13-24 @ 06:00) (13 - 24)  SpO2: 96% (01-13-24 @ 06:00) (91% - 100%)    atorvastatin 20 milliGRAM(s) Oral at bedtime  chlorhexidine 4% Liquid 1 Application(s) Topical daily  DULoxetine 60 milliGRAM(s) Oral daily  famotidine    Tablet 20 milliGRAM(s) Oral daily  insulin lispro (ADMELOG) corrective regimen sliding scale   SubCutaneous Before meals and at bedtime  levETIRAcetam 500 milliGRAM(s) Oral two times a day  levothyroxine 50 MICROGram(s) Oral daily  lisinopril 10 milliGRAM(s) Oral daily  metoprolol tartrate 25 milliGRAM(s) Oral every 12 hours  niMODipine Oral Solution 30 milliGRAM(s) Oral every 2 hours  polyethylene glycol 3350 17 Gram(s) Oral daily  senna 2 Tablet(s) Oral at bedtime        01-12-24 @ 07:01  -  01-13-24 @ 07:00  --------------------------------------------------------  IN: 2207.3 mL / OUT: 1100 mL / NET: 1107.3 mL        1/13 stable exam. Scheduled for angiogram on Tuesday    PHYSICAL EXAM:    General: No Acute Distress     Neurological: Awake, alert oriented to person, place and time, Following Commands, PERRL, EOMI, Face Symmetrical, Speech Fluent, Moving all extremities, Muscle Strength normal in all four extremities, No Drift, Sensation to Light Touch Intact    Pulmonary: Clear to Auscultation, No Rales, No Rhonchi, No Wheezes     Cardiovascular: S1, S2, Regular Rate and Rhythm     Gastrointestinal: Soft, Nontender, Nondistended     Extremities: No calf tenderness     Incision:

## 2024-01-13 NOTE — PROGRESS NOTE ADULT - ATTENDING COMMENTS
74yo woman tx from [facility] frontal IPH and SAH   CTA c/f AVF    CTH this am unchanged  intact exam    IV tylenol given overnight  nimodipine held for hypotension  Mg, Kphos, K repleted    SBP ; d/c lisinopril, decrease metoprolol 12.5mg Q12  Q2 checks; Q4 overnight   OOB as tolerated  angiogram per neuroIR ? Tuesday  keppra for seizure ppx   regular diet   LBM 1/10, cont bowel regimen   cont synthroid   BLE dopplers pending  off DVT ppx for now for SAH/IPH c/f AVF  afebrile   not critically ill 40min spent 72yo woman tx from [facility] frontal IPH and SAH   CTA c/f AVF    CTH this am unchanged  intact exam    IV tylenol given overnight  nimodipine held for hypotension  Mg, Kphos, K repleted    SBP ; d/c lisinopril, decrease metoprolol 12.5mg Q12  Q2 checks; Q4 overnight   OOB as tolerated  angiogram per neuroIR ? Tuesday  keppra for seizure ppx   regular diet   LBM 1/10, cont bowel regimen   cont synthroid   BLE dopplers pending  off DVT ppx for now for SAH/IPH c/f AVF  afebrile   not critically ill 40min spent

## 2024-01-13 NOTE — PHYSICAL THERAPY INITIAL EVALUATION ADULT - WEIGHT-BEARING RESTRICTIONS: GAIT, REHAB EVAL
Speech Therapy Hospital Course    1/3/18: diet that had been initiated was ground-minced; re-assessed, rec mech soft/thin due to poorly fitting dentures  1/2/18: TAVR under MAC sedation, no intubation.  12/29/17 pt on puree/thin diet per MD. Clinical: REC easy chew/thin.  12/27/17 Presented to ED from rehab center (DC'd to rehab center after hospitalization 12/19-12/23 for decompensated CHF) with c/o worsening SOB and weakness. SpO2 was 70% on RA at [facility]. Placed on BiPAP. WBC 12.6. CXR shows diffuse b/l patchy pulmonary opacities and b/l pleural effusions. Findings are nonspecific and may be related to vascular congestion with extensive pulmonary edema. Multifocal extensive pneumonitis is not entirely excluded. Suspect respiratory failure d/t CHF. Weight: 51kg.    Baseline- was independent with ADLs, living alone, driving, until 3 weeks ago. Most recently at [facility] ([phone]). Attempting to reach to determine if receiving speech/why on pureed diet.  PMH-   Past Medical History:   Diagnosis Date   • Aortic stenosis    • Atrial fibrillation (CMS/HCC)    • Heart failure (CMS/HCC)    • [place] (hard of hearing)    • Osteoporosis       Past Surgical History:   Procedure Laterality Date   • FRACTURE FRAME COMPLEX PELVIC  2012   • JOINT REPLACEMENT      right knee   • REMOVAL GALLBLADDER        No prior h/o speech therapy per [facility] chart review.    full weight-bearing

## 2024-01-13 NOTE — SPEECH LANGUAGE PATHOLOGY EVALUATION - SLP SHORT TERM MEMORY
Pt with poor working memory for category switching task, had to be reminded multiple times of relevant categories; intact recall events of this hospitalization

## 2024-01-13 NOTE — SPEECH LANGUAGE PATHOLOGY EVALUATION - SLP PATIENT/FAMILY GOALS STATEMENT
Pt reports that she is very independent and provides care for her 94 yo mother who lives alone, as well as for her grandchildren while their parents are at work. She states that she retired several years ago from working at [employer] in the , and lives alone. Pt reports that she is very independent and provides care for her 96 yo mother who lives alone, as well as for her grandchildren while their parents are at work. She states that she retired several years ago from working at [employer] in the , and lives alone.

## 2024-01-13 NOTE — OCCUPATIONAL THERAPY INITIAL EVALUATION ADULT - ORIENTATION, REHAB EVAL
4/28 on Short Blessed test demonstrating normal cognition/oriented to person, place, time and situation

## 2024-01-13 NOTE — PHYSICAL THERAPY INITIAL EVALUATION ADULT - ADDITIONAL COMMENTS
Pt resides alone in a senior living apartment, no steps to enter building, +elevator inside. PTA pt was independent with all mobility & ADL's. Did not use an AD for ambulation.

## 2024-01-13 NOTE — PROGRESS NOTE ADULT - ASSESSMENT
A/P:  left frontal IPH and SAH, ICH 1, SAH mfs 1, HHS 1   CTA concern for AVF and aneurysm     Neuro: neuro checks q 1 hr   keppra 500 mg BID for seizure ppx x 7 days duration   CTA with some abnormal vasculature concerning for AVF  Angiogram on Tuesday   Continue home duloxetine   On nimodipine for DCI prophylaxis, goal normonatremia, normovolemia      Respiratory:  RA     CV: HTN on metoprolol 25 mg BID  Continue Lisinopril 10 mg, metoprolol 25 mg BID with holding parameters    SBP  mmhg   L Radial A line     Endocrine:  Hpothyroidism - continue Synthroid    GI: Regular diet  Continue home Pepcid   Bowel regimen     Heme/Onc:     H/H stable        DVT ppx: hold chemoprophylaxis today     Renal: IVL  Monitor renal function, strict I/O  Maintain normonatremia, normovolemia    ID: afebrile  A/P:  left frontal IPH and SAH, ICH 1, SAH mfs 1, HHS 1   CTA concern for AVF and aneurysm     Neuro: neuro checks q 2 hr   keppra 500 mg BID for seizure ppx x 7 days duration   CTA with some abnormal vasculature concerning for AVF  Angiogram on Tuesday   Continue home duloxetine   On nimodipine for DCI prophylaxis, goal normonatremia, normovolemia      Respiratory:  RA     CV: HTN on metoprolol 25 mg BID  Continue metoprolol 25 mg BID with holding parameters    SBP  mmhg   L Radial A line     Endocrine:  Hpothyroidism - continue Synthroid    GI: Regular diet  Continue home Pepcid   Bowel regimen     Heme/Onc:     H/H stable        DVT ppx: hold chemoprophylaxis today   LED-p    Renal: IVL  Monitor renal function, strict I/O  Maintain normonatremia, normovolemia    ID: afebrile

## 2024-01-13 NOTE — SPEECH LANGUAGE PATHOLOGY EVALUATION - SLP BEHAVIORAL OBSERVATIONS
cooperative; Pt reported feeling very anxious and tired due to not sleeping much over the last five days./within functional limits/alert

## 2024-01-13 NOTE — SPEECH LANGUAGE PATHOLOGY EVALUATION - SLP PERTINENT HISTORY OF CURRENT PROBLEM
left frontal IPH and SAH, ICH 1, SAH mfs 1, HHS 1; CTA concern for AVF and aneurysm; plan for angio Tuesday; Exam: intact

## 2024-01-13 NOTE — PHYSICAL THERAPY INITIAL EVALUATION ADULT - PREDICTED DURATION OF THERAPY (DAYS/WKS), PT EVAL
Referred by: Lucy Ricks MD    Where did today's visit take place. Met with patient in office to administer the Gordon Cognitive Assessment Screen and Geriatric Depression Scale (GDS) per best practice guidelines. Informed the patient results will be given by Dr Millicent MCNAMARA.      4M's    What Matters:    \"My daughter, my \"    Mentation:    The following cognitive assessments have been performed.   [facility]  Geriatric Depression Screening   Medical History:  See updated medical chart.   Social History: Patient is poor historian. Lives with  in senior housing. States I live with a bunch of old people. The patient reports 2 years of college. Unable to state any work histoy. Denies word finding or memory problems. However noted.    Mental Status examination: The patient is observed using profanity when speaking with staff. She was calm primarily for writer and screening, though became increasing agitated after screen and awaiting . Escalation of behaviors observed with raising of fist. .   GDS scored 1 out 15 denies feelings of helplessness, hopelessness or worthlessness. Denies anhedonia.   The patient scored 11 out 30 on the [place] Cognitive Assessment Screen  Visuospatial/Executive Function:  Scored 3 out 5 points  Naming: Scored 2 out 3 point  Forward Digit span: scored 0 out 1 point  Backward Digit Span: Score 1 out 2 points  Vigilance: Scored 1 out 1 point  Serial Seven Subtraction: Scored 0 out 3 points with 2 responses 9 and 14 given as answers.  Verbal Fluency: 0 out 1 point. Provided 6 answered. Two intrusions and 2 perseveration's.   Abstraction: Scored 0 out 2 points.   Delayed recall: Scored 0 out of 5 points identified 5 word with multiple choice cue.(0 points).  Orientation: Scored 2 out 6 points.   Pt complotted screening. Observed to be increasing in agitation and use of profanity and demanding to leave.  brought to room.   Mobility:    The following mobility assessments  have been performed/discussed.  Attempted Pace Walk. Patient unsteady and then refused further assessment.      Medications  Patient unable to state medications. /    Current Outpatient Medications   Medication Sig Dispense Refill   • polyethylene glycol (MIRALAX) 17 GM/SCOOP powder Take 510 Gm size bottle and mix according to physicians prep instructions days prior to colonoscopy. 510 g 0   • bisacodyl (DULCOLAX) 5 MG EC tablet Take  prior to colonoscopy per written physician's instructions. 3 tablet 0   • HYDROcodone-acetaminophen (NORCO) 5-325 MG per tablet Take 1 tablet by mouth every 6 hours as needed for Pain. (Patient taking differently: Take 1 tablet by mouth every 6 hours as needed for Pain. 1-2 tab daily) 20 tablet 0   • docusate sodium-sennosides (SENOKOT S) 50-8.6 MG per tablet Take 2 tablets by mouth daily as needed for Constipation. 30 tablet 0   • naLOXone (NARCAN) 4 MG/0.1ML nasal spray Spray the content of 1 device into 1 nostril. Call 911. May repeat with 2nd device in alternate nostril if no response in 2-3 minutes. 2 each 1   • lisinopril (ZESTRIL) 2.5 MG tablet Take 1 tablet by mouth daily. Do not start before May 27, 2022. 30 tablet 0   • pantoprazole (PROTONIX) 40 MG tablet Take 1 tablet by mouth every 12 hours. 60 tablet 0   • Multiple Vitamins-Minerals (ONE-A-DAY WOMENS PO) Take 1 tablet by mouth.     • Aspirin Buf,CaCarb-MgCarb-MgO, 81 MG Tab Take 81 mg by mouth daily.     • atorvastatin (LIPITOR) 40 MG tablet Take 40 mg by mouth daily.     • metoPROLOL succinate (TOPROL-XL) 25 MG 24 hr tablet Take 25 mg by mouth daily.     • mirtazapine (REMERON) 7.5 MG tablet 7.5 mg nightly.        No current facility-administered medications for this visit.          Resources    Was any resource material given to the patient today? Yes: Adult Protective Services  Notified. KARRIE Barnes.                3 weeks

## 2024-01-13 NOTE — OCCUPATIONAL THERAPY INITIAL EVALUATION ADULT - PERTINENT HX OF CURRENT PROBLEM, REHAB EVAL
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 Her son took her to to [facility] yesterday and was found to have left IPH and SAH. MRI brain no underlying mass, CTA head ? 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 patient transferred to [facility] for angio 72 yo woman with PMH of HTN, HLD, hypothyroidism, had severe sudden headache on Saturday while [employer]. Her son took her to to [facility] yesterday and was found to have left IPH and SAH. MRI brain no underlying mass, CTA head ? 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

## 2024-01-14 LAB
ANION GAP SERPL CALC-SCNC: 13 MMOL/L — SIGNIFICANT CHANGE UP (ref 5–17)
ANION GAP SERPL CALC-SCNC: 13 MMOL/L — SIGNIFICANT CHANGE UP (ref 5–17)
BUN SERPL-MCNC: 9 MG/DL — SIGNIFICANT CHANGE UP (ref 7–23)
BUN SERPL-MCNC: 9 MG/DL — SIGNIFICANT CHANGE UP (ref 7–23)
CALCIUM SERPL-MCNC: 9.2 MG/DL — SIGNIFICANT CHANGE UP (ref 8.4–10.5)
CALCIUM SERPL-MCNC: 9.2 MG/DL — SIGNIFICANT CHANGE UP (ref 8.4–10.5)
CHLORIDE SERPL-SCNC: 107 MMOL/L — SIGNIFICANT CHANGE UP (ref 96–108)
CHLORIDE SERPL-SCNC: 107 MMOL/L — SIGNIFICANT CHANGE UP (ref 96–108)
CO2 SERPL-SCNC: 18 MMOL/L — LOW (ref 22–31)
CO2 SERPL-SCNC: 18 MMOL/L — LOW (ref 22–31)
CREAT SERPL-MCNC: 0.65 MG/DL — SIGNIFICANT CHANGE UP (ref 0.5–1.3)
CREAT SERPL-MCNC: 0.65 MG/DL — SIGNIFICANT CHANGE UP (ref 0.5–1.3)
EGFR: 93 ML/MIN/1.73M2 — SIGNIFICANT CHANGE UP
EGFR: 93 ML/MIN/1.73M2 — SIGNIFICANT CHANGE UP
GLUCOSE SERPL-MCNC: 96 MG/DL — SIGNIFICANT CHANGE UP (ref 70–99)
GLUCOSE SERPL-MCNC: 96 MG/DL — SIGNIFICANT CHANGE UP (ref 70–99)
HCT VFR BLD CALC: 37.7 % — SIGNIFICANT CHANGE UP (ref 34.5–45)
HCT VFR BLD CALC: 37.7 % — SIGNIFICANT CHANGE UP (ref 34.5–45)
HGB BLD-MCNC: 13.1 G/DL — SIGNIFICANT CHANGE UP (ref 11.5–15.5)
HGB BLD-MCNC: 13.1 G/DL — SIGNIFICANT CHANGE UP (ref 11.5–15.5)
MAGNESIUM SERPL-MCNC: 1.8 MG/DL — SIGNIFICANT CHANGE UP (ref 1.6–2.6)
MAGNESIUM SERPL-MCNC: 1.8 MG/DL — SIGNIFICANT CHANGE UP (ref 1.6–2.6)
MCHC RBC-ENTMCNC: 32.3 PG — SIGNIFICANT CHANGE UP (ref 27–34)
MCHC RBC-ENTMCNC: 32.3 PG — SIGNIFICANT CHANGE UP (ref 27–34)
MCHC RBC-ENTMCNC: 34.7 GM/DL — SIGNIFICANT CHANGE UP (ref 32–36)
MCHC RBC-ENTMCNC: 34.7 GM/DL — SIGNIFICANT CHANGE UP (ref 32–36)
MCV RBC AUTO: 92.9 FL — SIGNIFICANT CHANGE UP (ref 80–100)
MCV RBC AUTO: 92.9 FL — SIGNIFICANT CHANGE UP (ref 80–100)
NRBC # BLD: 0 /100 WBCS — SIGNIFICANT CHANGE UP (ref 0–0)
NRBC # BLD: 0 /100 WBCS — SIGNIFICANT CHANGE UP (ref 0–0)
PHOSPHATE SERPL-MCNC: 3 MG/DL — SIGNIFICANT CHANGE UP (ref 2.5–4.5)
PHOSPHATE SERPL-MCNC: 3 MG/DL — SIGNIFICANT CHANGE UP (ref 2.5–4.5)
PLATELET # BLD AUTO: 242 K/UL — SIGNIFICANT CHANGE UP (ref 150–400)
PLATELET # BLD AUTO: 242 K/UL — SIGNIFICANT CHANGE UP (ref 150–400)
POTASSIUM SERPL-MCNC: 4 MMOL/L — SIGNIFICANT CHANGE UP (ref 3.5–5.3)
POTASSIUM SERPL-MCNC: 4 MMOL/L — SIGNIFICANT CHANGE UP (ref 3.5–5.3)
POTASSIUM SERPL-SCNC: 4 MMOL/L — SIGNIFICANT CHANGE UP (ref 3.5–5.3)
POTASSIUM SERPL-SCNC: 4 MMOL/L — SIGNIFICANT CHANGE UP (ref 3.5–5.3)
RBC # BLD: 4.06 M/UL — SIGNIFICANT CHANGE UP (ref 3.8–5.2)
RBC # BLD: 4.06 M/UL — SIGNIFICANT CHANGE UP (ref 3.8–5.2)
RBC # FLD: 12 % — SIGNIFICANT CHANGE UP (ref 10.3–14.5)
RBC # FLD: 12 % — SIGNIFICANT CHANGE UP (ref 10.3–14.5)
SODIUM SERPL-SCNC: 138 MMOL/L — SIGNIFICANT CHANGE UP (ref 135–145)
SODIUM SERPL-SCNC: 138 MMOL/L — SIGNIFICANT CHANGE UP (ref 135–145)
WBC # BLD: 11.66 K/UL — HIGH (ref 3.8–10.5)
WBC # BLD: 11.66 K/UL — HIGH (ref 3.8–10.5)
WBC # FLD AUTO: 11.66 K/UL — HIGH (ref 3.8–10.5)
WBC # FLD AUTO: 11.66 K/UL — HIGH (ref 3.8–10.5)

## 2024-01-14 PROCEDURE — 36620 INSERTION CATHETER ARTERY: CPT

## 2024-01-14 PROCEDURE — 93886 INTRACRANIAL COMPLETE STUDY: CPT | Mod: 26

## 2024-01-14 PROCEDURE — 99232 SBSQ HOSP IP/OBS MODERATE 35: CPT

## 2024-01-14 RX ORDER — ACETAMINOPHEN 500 MG
1000 TABLET ORAL ONCE
Refills: 0 | Status: COMPLETED | OUTPATIENT
Start: 2024-01-14 | End: 2024-01-14

## 2024-01-14 RX ORDER — ACETAMINOPHEN 500 MG
650 TABLET ORAL ONCE
Refills: 0 | Status: COMPLETED | OUTPATIENT
Start: 2024-01-14 | End: 2024-01-14

## 2024-01-14 RX ORDER — LOPERAMIDE HCL 2 MG
4 TABLET ORAL DAILY
Refills: 0 | Status: COMPLETED | OUTPATIENT
Start: 2024-01-14 | End: 2024-01-17

## 2024-01-14 RX ORDER — ENOXAPARIN SODIUM 100 MG/ML
40 INJECTION SUBCUTANEOUS
Refills: 0 | Status: DISCONTINUED | OUTPATIENT
Start: 2024-01-14 | End: 2024-01-14

## 2024-01-14 RX ORDER — MAGNESIUM SULFATE 500 MG/ML
1 VIAL (ML) INJECTION ONCE
Refills: 0 | Status: COMPLETED | OUTPATIENT
Start: 2024-01-14 | End: 2024-01-14

## 2024-01-14 RX ADMIN — NIMODIPINE 30 MILLIGRAM(S): 60 SOLUTION ORAL at 06:12

## 2024-01-14 RX ADMIN — NIMODIPINE 30 MILLIGRAM(S): 60 SOLUTION ORAL at 14:15

## 2024-01-14 RX ADMIN — CHLORHEXIDINE GLUCONATE 1 APPLICATION(S): 213 SOLUTION TOPICAL at 11:43

## 2024-01-14 RX ADMIN — Medication 100 GRAM(S): at 03:28

## 2024-01-14 RX ADMIN — Medication 12.5 MILLIGRAM(S): at 18:23

## 2024-01-14 RX ADMIN — DULOXETINE HYDROCHLORIDE 60 MILLIGRAM(S): 30 CAPSULE, DELAYED RELEASE ORAL at 11:40

## 2024-01-14 RX ADMIN — NIMODIPINE 30 MILLIGRAM(S): 60 SOLUTION ORAL at 20:26

## 2024-01-14 RX ADMIN — ATORVASTATIN CALCIUM 20 MILLIGRAM(S): 80 TABLET, FILM COATED ORAL at 22:20

## 2024-01-14 RX ADMIN — Medication 650 MILLIGRAM(S): at 18:23

## 2024-01-14 RX ADMIN — Medication 1000 MILLIGRAM(S): at 09:19

## 2024-01-14 RX ADMIN — NIMODIPINE 30 MILLIGRAM(S): 60 SOLUTION ORAL at 08:05

## 2024-01-14 RX ADMIN — LEVETIRACETAM 500 MILLIGRAM(S): 250 TABLET, FILM COATED ORAL at 06:13

## 2024-01-14 RX ADMIN — NIMODIPINE 30 MILLIGRAM(S): 60 SOLUTION ORAL at 16:15

## 2024-01-14 RX ADMIN — NIMODIPINE 30 MILLIGRAM(S): 60 SOLUTION ORAL at 00:14

## 2024-01-14 RX ADMIN — Medication 650 MILLIGRAM(S): at 18:53

## 2024-01-14 RX ADMIN — LEVETIRACETAM 500 MILLIGRAM(S): 250 TABLET, FILM COATED ORAL at 18:23

## 2024-01-14 RX ADMIN — Medication 4 MILLIGRAM(S): at 17:15

## 2024-01-14 RX ADMIN — NIMODIPINE 30 MILLIGRAM(S): 60 SOLUTION ORAL at 12:00

## 2024-01-14 RX ADMIN — NIMODIPINE 30 MILLIGRAM(S): 60 SOLUTION ORAL at 22:19

## 2024-01-14 RX ADMIN — Medication 50 MICROGRAM(S): at 06:13

## 2024-01-14 RX ADMIN — NIMODIPINE 30 MILLIGRAM(S): 60 SOLUTION ORAL at 18:05

## 2024-01-14 RX ADMIN — Medication 400 MILLIGRAM(S): at 09:04

## 2024-01-14 RX ADMIN — FAMOTIDINE 20 MILLIGRAM(S): 10 INJECTION INTRAVENOUS at 11:40

## 2024-01-14 NOTE — PROGRESS NOTE ADULT - SUBJECTIVE AND OBJECTIVE BOX
HPI:  74 yo woman with PMH of HTN, HLD, hypothyroidism, had severe sudden headache on Saturday while [employer]. Her son took her to to [facility] yesterday and was found to have left IPH and SAH. MRI brain no underlying mass, CTA head ? 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 patient transferred to [facility] for angio     24 hour Events:  1/14- No events. Patient's examination stable      Allergies    sulfa drugs (Hives)    Intolerances    opioid-like analgesics (Vomiting)      REVIEW OF SYSTEMS: [ ] Unable to Assess due to neurologic exam   [x] All ROS addressed below are non-contributory, except:  Neuro: [x] Headache [ ] Back pain [ ] Numbness [ ] Weakness [ ] Ataxia [ ] Dizziness [ ] Aphasia [ ] Dysarthria [ ] Visual disturbance  Resp: [ ] Shortness of breath/dyspnea, [ ] Orthopnea [ ] Cough  CV: [ ] Chest pain [ ] Palpitation [ ] Lightheadedness [ ] Syncope  Renal: [ ] Thirst [ ] Edema  GI: [ ] Nausea [ ] Emesis [ ] Abdominal pain [ ] Constipation [ ] Diarrhea  Hem: [ ] Hematemesis [ ] bright red blood per rectum  ID: [ ] Fever [ ] Chills [ ] Dysuria  ENT: [ ] Rhinorrhea      DEVICES:   [ ] Restraints [ ] ET tube [ ] central line [ ] arterial line [ ] hicks [ ] NGT/OGT [ ] EVD [ ] LD [ ] JAYSON/HMV [ ] Trach [ ] PEG [ ] Chest Tube     VITALS:   Vital Signs Last 24 Hrs  T(C): 36.9 (13 Jan 2024 23:00), Max: 36.9 (13 Jan 2024 03:00)  T(F): 98.5 (13 Jan 2024 23:00), Max: 98.5 (13 Jan 2024 03:00)  HR: 115 (14 Jan 2024 00:00) (53 - 115)  BP: 136/68 (14 Jan 2024 00:00) (94/82 - 156/70)  BP(mean): 86 (14 Jan 2024 00:00) (70 - 113)  RR: 17 (14 Jan 2024 00:00) (13 - 27)  SpO2: 95% (14 Jan 2024 00:00) (92% - 100%)    Parameters below as of 13 Jan 2024 12:39  Patient On (Oxygen Delivery Method): room air      CAPILLARY BLOOD GLUCOSE      POCT Blood Glucose.: 90 mg/dL (13 Jan 2024 22:16)  POCT Blood Glucose.: 91 mg/dL (13 Jan 2024 17:01)  POCT Blood Glucose.: 90 mg/dL (13 Jan 2024 11:58)  POCT Blood Glucose.: 93 mg/dL (13 Jan 2024 06:38)    I&O's Summary    12 Jan 2024 07:01  -  13 Jan 2024 07:00  --------------------------------------------------------  IN: 2207.3 mL / OUT: 1100 mL / NET: 1107.3 mL    13 Jan 2024 07:01  -  14 Jan 2024 00:49  --------------------------------------------------------  IN: 830 mL / OUT: 1100 mL / NET: -270 mL        Respiratory:        LABS:                        13.4   13.24 )-----------( 235      ( 12 Jan 2024 21:42 )             38.3     01-12    138  |  101  |  7   ----------------------------<  99  3.5   |  24  |  0.61             MEDICATION LEVELS:     IVF FLUIDS/MEDICATIONS:   MEDICATIONS  (STANDING):  atorvastatin 20 milliGRAM(s) Oral at bedtime  chlorhexidine 4% Liquid 1 Application(s) Topical daily  DULoxetine 60 milliGRAM(s) Oral daily  famotidine    Tablet 20 milliGRAM(s) Oral daily  insulin lispro (ADMELOG) corrective regimen sliding scale   SubCutaneous Before meals and at bedtime  levETIRAcetam 500 milliGRAM(s) Oral two times a day  levothyroxine 50 MICROGram(s) Oral daily  metoprolol tartrate 12.5 milliGRAM(s) Oral every 12 hours  niMODipine Oral Solution 30 milliGRAM(s) Oral every 2 hours  polyethylene glycol 3350 17 Gram(s) Oral daily  senna 2 Tablet(s) Oral at bedtime    MEDICATIONS  (PRN):        IMAGING:      EXAMINATION:  PHYSICAL EXAM:    Constitutional: No Acute Distress     Neurological: Awake, alert oriented to person, place and time, Following Commands, PERRL, EOMI, No Gaze Preference, Face Symmetrical, Speech Fluent, No dysmetria, No ataxia, No nystagmus     Motor exam:          Upper extremity                         Delt     Bicep     Tricep    HG                                                 R         5/5 5/5 5/5 5/5                                               L          5/5 5/5 5/5 5/5          Lower extremity                        HF         KF        KE       DF         PF                                                  R        5/5 5/5 5/5 5/5         5/5                                               L         5/5 5/5 5/5 5/5          5/5                                                 Sensation: [x] intact to light touch  [ ] decreased:     Pulmonary: Clear to Auscultation, No rales, No rhonchi, No wheezes     Cardiovascular: S1, S2, Regular rate and rhythm     Gastrointestinal: Soft, Non-tender, Non-distended     Extremities: No calf tenderness     Incision:    HPI:  72 yo woman with PMH of HTN, HLD, hypothyroidism, had severe sudden headache on Saturday while [employer]. Her son took her to to [facility] yesterday and was found to have left IPH and SAH. MRI brain no underlying mass, CTA head ? 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 patient transferred to [facility] for angio     24 hour Events:  1/14- No events. Patient's examination stable      Allergies    sulfa drugs (Hives)    Intolerances    opioid-like analgesics (Vomiting)      REVIEW OF SYSTEMS: [ ] Unable to Assess due to neurologic exam   [x] All ROS addressed below are non-contributory, except:  Neuro: [x] Headache [ ] Back pain [ ] Numbness [ ] Weakness [ ] Ataxia [ ] Dizziness [ ] Aphasia [ ] Dysarthria [ ] Visual disturbance  Resp: [ ] Shortness of breath/dyspnea, [ ] Orthopnea [ ] Cough  CV: [ ] Chest pain [ ] Palpitation [ ] Lightheadedness [ ] Syncope  Renal: [ ] Thirst [ ] Edema  GI: [ ] Nausea [ ] Emesis [ ] Abdominal pain [ ] Constipation [ ] Diarrhea  Hem: [ ] Hematemesis [ ] bright red blood per rectum  ID: [ ] Fever [ ] Chills [ ] Dysuria  ENT: [ ] Rhinorrhea      DEVICES:   [ ] Restraints [ ] ET tube [ ] central line [ ] arterial line [ ] hicks [ ] NGT/OGT [ ] EVD [ ] LD [ ] JAYSON/HMV [ ] Trach [ ] PEG [ ] Chest Tube     VITALS:   Vital Signs Last 24 Hrs  T(C): 36.9 (13 Jan 2024 23:00), Max: 36.9 (13 Jan 2024 03:00)  T(F): 98.5 (13 Jan 2024 23:00), Max: 98.5 (13 Jan 2024 03:00)  HR: 115 (14 Jan 2024 00:00) (53 - 115)  BP: 136/68 (14 Jan 2024 00:00) (94/82 - 156/70)  BP(mean): 86 (14 Jan 2024 00:00) (70 - 113)  RR: 17 (14 Jan 2024 00:00) (13 - 27)  SpO2: 95% (14 Jan 2024 00:00) (92% - 100%)    Parameters below as of 13 Jan 2024 12:39  Patient On (Oxygen Delivery Method): room air      CAPILLARY BLOOD GLUCOSE      POCT Blood Glucose.: 90 mg/dL (13 Jan 2024 22:16)  POCT Blood Glucose.: 91 mg/dL (13 Jan 2024 17:01)  POCT Blood Glucose.: 90 mg/dL (13 Jan 2024 11:58)  POCT Blood Glucose.: 93 mg/dL (13 Jan 2024 06:38)    I&O's Summary    12 Jan 2024 07:01  -  13 Jan 2024 07:00  --------------------------------------------------------  IN: 2207.3 mL / OUT: 1100 mL / NET: 1107.3 mL    13 Jan 2024 07:01  -  14 Jan 2024 00:49  --------------------------------------------------------  IN: 830 mL / OUT: 1100 mL / NET: -270 mL        Respiratory:        LABS:                        13.4   13.24 )-----------( 235      ( 12 Jan 2024 21:42 )             38.3     01-12    138  |  101  |  7   ----------------------------<  99  3.5   |  24  |  0.61             MEDICATION LEVELS:     IVF FLUIDS/MEDICATIONS:   MEDICATIONS  (STANDING):  atorvastatin 20 milliGRAM(s) Oral at bedtime  chlorhexidine 4% Liquid 1 Application(s) Topical daily  DULoxetine 60 milliGRAM(s) Oral daily  famotidine    Tablet 20 milliGRAM(s) Oral daily  insulin lispro (ADMELOG) corrective regimen sliding scale   SubCutaneous Before meals and at bedtime  levETIRAcetam 500 milliGRAM(s) Oral two times a day  levothyroxine 50 MICROGram(s) Oral daily  metoprolol tartrate 12.5 milliGRAM(s) Oral every 12 hours  niMODipine Oral Solution 30 milliGRAM(s) Oral every 2 hours  polyethylene glycol 3350 17 Gram(s) Oral daily  senna 2 Tablet(s) Oral at bedtime    MEDICATIONS  (PRN):        IMAGING:      EXAMINATION:  PHYSICAL EXAM:    Constitutional: No Acute Distress     Neurological: Awake, alert oriented to person, place and time, Following Commands, PERRL, EOMI, No Gaze Preference, Face Symmetrical, Speech Fluent, No dysmetria, No ataxia, No nystagmus     Motor exam:          Upper extremity                         Delt     Bicep     Tricep    HG                                                 R         5/5 5/5 5/5 5/5                                               L          5/5 5/5 5/5 5/5          Lower extremity                        HF         KF        KE       DF         PF                                                  R        5/5 5/5 5/5 5/5         5/5                                               L         5/5 5/5 5/5 5/5          5/5                                                 Sensation: [x] intact to light touch  [ ] decreased:     Pulmonary: Clear to Auscultation, No rales, No rhonchi, No wheezes     Cardiovascular: S1, S2, Regular rate and rhythm     Gastrointestinal: Soft, Non-tender, Non-distended     Extremities: No calf tenderness     Incision:

## 2024-01-14 NOTE — PROGRESS NOTE ADULT - ATTENDING COMMENTS
72yo woman tx from [facility] frontal IPH and SAH   CTA c/f AVF    CTH this am unchanged  intact exam    po tylenol prn for HA   nimodipine held for hypotension  Mg repleted    SBP ; d/c lisinopril, decrease metoprolol 12.5mg Q12  Q2 checks; Q4 overnight   OOB as tolerated  angiogram per neuroIR ? Tuesday  keppra for seizure ppx   RA  regular diet   LBM 1/14 x2 hold bowel regimen   cont synthroid   BLE dopplers pending  off DVT ppx for now for SAH/IPH c/f AVF  1/13 BLE dopplers negative   afebrile     not critically ill 40min spent 74yo woman tx from [facility] frontal IPH and SAH   CTA c/f AVF    CTH this am unchanged  intact exam    po tylenol prn for HA   nimodipine held for hypotension  Mg repleted    SBP ; d/c lisinopril, decrease metoprolol 12.5mg Q12  Q2 checks; Q4 overnight   OOB as tolerated  angiogram per neuroIR ? Tuesday  keppra for seizure ppx   RA  regular diet   LBM 1/14 x2 hold bowel regimen   cont synthroid   BLE dopplers pending  off DVT ppx for now for SAH/IPH c/f AVF  1/13 BLE dopplers negative   afebrile     not critically ill 40min spent

## 2024-01-14 NOTE — PROCEDURE NOTE - NSPROCDETAILS_GEN_ALL_CORE
location identified, draped/prepped, sterile technique used, needle inserted/introduced/positive blood return obtained via catheter/connected to a pressurized flush line/sutured in place/hemostasis with direct pressure, dressing applied/Seldinger technique/all materials/supplies accounted for at end of procedure
location identified, draped/prepped, sterile technique used, needle inserted/introduced/positive blood return obtained via catheter/connected to a pressurized flush line/sutured in place/hemostasis with direct pressure, dressing applied/all materials/supplies accounted for at end of procedure

## 2024-01-14 NOTE — PROGRESS NOTE ADULT - SUBJECTIVE AND OBJECTIVE BOX
Interval events:  No acute events on evening rounds.  SBP <140.   TCDs wnl.     VITALS:  T(C): , Max: 36.9 (01-13-24 @ 23:00)  HR:  (64 - 115)  BP:  (92/66 - 137/67)  ABP:  (62/45 - 172/97)  RR:  (11 - 23)  SpO2:  (92% - 99%)  Wt(kg): --      01-13-24 @ 07:01  -  01-14-24 @ 07:00  --------------------------------------------------------  IN: 1050 mL / OUT: 1100 mL / NET: -50 mL    01-14-24 @ 07:01  -  01-14-24 @ 22:08  --------------------------------------------------------  IN: 850 mL / OUT: 0 mL / NET: 850 mL      LABS:  Na: 138 (01-14 @ 00:47), 138 (01-12 @ 17:57)  K: 4.0 (01-14 @ 00:47), 3.5 (01-12 @ 17:57)  Cl: 107 (01-14 @ 00:47), 101 (01-12 @ 17:57)  CO2: 18 (01-14 @ 00:47), 24 (01-12 @ 17:57)  BUN: 9 (01-14 @ 00:47), 7 (01-12 @ 17:57)  Cr: 0.65 (01-14 @ 00:47), 0.61 (01-12 @ 17:57)  Glu: 96(01-14 @ 00:47), 99(01-12 @ 17:57)    Hgb: 13.4 (01-12 @ 21:42)  Hct: 38.3 (01-12 @ 21:42)  WBC: 13.24 (01-12 @ 21:42)  Plt: 235 (01-12 @ 21:42)    INR: 1.08 01-12-24 @ 18:59  PTT: 30.0 01-12-24 @ 18:59    MEDICATIONS:  atorvastatin 20 milliGRAM(s) Oral at bedtime  chlorhexidine 4% Liquid 1 Application(s) Topical daily  DULoxetine 60 milliGRAM(s) Oral daily  famotidine    Tablet 20 milliGRAM(s) Oral daily  levETIRAcetam 500 milliGRAM(s) Oral two times a day  levothyroxine 50 MICROGram(s) Oral daily  loperamide 4 milliGRAM(s) Oral daily PRN  metoprolol tartrate 12.5 milliGRAM(s) Oral every 12 hours  niMODipine Oral Solution 30 milliGRAM(s) Oral every 2 hours    EXAMINATION:  Please see exam from daytime.     Assessment/Plan:   72 yo woman with h/o HTN, HLD, hypothyroidism, had severe sudden headache on 1/6 with worsening on 1/11. 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     No change to plan from daytime  angiogram Tuesday as per nrsg  c/w Keppra ppx  off DVT ppx for now for SAH/IPH c/f AVF    Candida Coto  Neurocritical Care Attending

## 2024-01-14 NOTE — CHART NOTE - NSCHARTNOTEFT_GEN_A_CORE
Transcranial doppler exam #1 (1/14/24) 1300pm  mean velocity  cm/sec                              Left         Right  LUCINA                    x             x  MCA                   40           48  PCA                    [id number]       x  VERT                    33          34   BA                              37  Technically difficult study.  Poor bilateral temporal windows.  Official report to follow.  Vaibhav

## 2024-01-14 NOTE — PROGRESS NOTE ADULT - ASSESSMENT
A/P:  left frontal IPH and SAH, ICH 1, SAH mfs 1, HHS 1   CTA concern for AVF and aneurysm     Neuro: neuro checks q 2 hr   keppra 500 mg BID for seizure ppx x 7 days duration   CTA with some abnormal vasculature concerning for AVF  Angiogram on Tuesday   Continue home duloxetine   On nimodipine for DCI prophylaxis, goal normonatremia, normovolemia      Respiratory:  RA     CV: HTN on metoprolol 25 mg BID  Continue metoprolol 25 mg BID with holding parameters    SBP  mmhg   L Radial A line     Endocrine:  Hpothyroidism - continue Synthroid    GI: Regular diet  Continue home Pepcid   Bowel regimen     Heme/Onc:     H/H stable        DVT ppx: Start as the patient has had 2 stable CTH - SQL   LED- negative     Renal: IVL  Monitor renal function, strict I/O  Maintain normonatremia, normovolemia    ID: afebrile

## 2024-01-14 NOTE — PROCEDURE NOTE - NSINDICATIONS_GEN_A_CORE
blood sampling/cannulation purposes/critical patient/monitoring purposes
arterial puncture to obtain ABG's/blood sampling/cannulation purposes/critical patient/monitoring purposes

## 2024-01-14 NOTE — PROCEDURE NOTE - NSCHLORHEXIDINEBATH_GEN_A_CORE
Diet: Diabetes  Food is an important tool that you can use to control diabetes and stay healthy. Eating well-balanced meals in the correct amounts will help you control your blood glucose levels and prevent low blood sugar reactions. It will also help you reduce the health risks of diabetes. There is no one specific diet that is right for everyone with diabetes. But there are general guidelines to follow. A registered dietitian (RD) will create a tailored diet approach thats just right for you. He or she will also help you plan healthy meals and snacks. If you have any questions, call your dietitian for advice.     Guidelines for success  Talk with your healthcare provider before starting a diabetes diet or weight loss program. If you haven't talked with a dietitian yet, ask your provider for a referral. The following guidelines can help you succeed:  · Select foods from the 6 food groups below. Your dietitian will help you find food choices within each group. He or she will also show you serving sizes and how many servings you can have at each meal.  ¨ Grains, beans, and starchy vegetables  ¨ Vegetables  ¨ Fruit  ¨ Milk or yogurt  ¨ Meat, poultry, fish, or tofu  ¨ Healthy fats  · Check your blood sugar levels as directed by your provider. Take any medicine as prescribed by your provider.  · Learn to read food labels and pick the right portion sizes.  · Eat only the amount of food in your meal plan. Eat about the same amount of food at regular times each day. Dont skip meals. Eat meals 4 to 5 hours apart, with snacks in between.  · Limit alcohol. It raises blood sugar levels. Drink water or calorie-free diet drinks that use safe sweeteners.  · Eat less fat to help lower your risk of heart disease. Use nonfat or low-fat dairy products and lean meats. Avoid fried foods. Use cooking oils that are unsaturated, such as olive, canola, or peanut oil.  · Talk with your dietitian about safe sugar substitutes.  · Avoid  added salt. It can contribute to high blood pressure, which can cause heart disease. People with diabetes already have a risk of high blood pressure and heart disease.  · Stay at a healthy weight. If you need to lose weight, cut down on your portion sizes. But dont skip meals. Exercise is an important part of any weight management program. Talk with your provider about an exercise program thats right for you.  · For more information about the best diet plan for you, talk with a registered dietitian (RD). To find an RD in your area, contact:  ¨ Academy of Nutrition and Dietetics www.eatright.org  ¨ The American Diabetes Association [phone] www.diabetes.org  Date Last Reviewed: 8/1/2016 © 2000-2017 [employer]. [address] [place] [address], [place] [address]. All rights reserved. This information is not intended as a substitute for professional medical care. Always follow your healthcare professional's instructions.        Diabetes: Meal Planning    You can help keep your blood sugar level in your target range by eating healthy foods. Your healthcare team can help you create a low-fat, nutritious meal plan. Take an active role in your diabetes management by following your meal plan and working with your healthcare team.  Make your meal plan  A meal plan gives guidelines for the types and amounts of food you should eat. The goal is to balance food and insulin (or other diabetes medications) so your blood sugars will be in your target range. Your dietitian will help you make a flexible meal plan that includes many foods that you like.  Watch serving sizes  Your meal plan will group foods by servings. To learn how much a serving is, start by measuring food portions at each meal. Soon youll know what a serving looks like on your plate. Ask your healthcare provider about how to balance servings of different foods.  Eat from all the food groups  The basis of a healthy meal plan is variety (eating lots  of different foods). Choose lean meats, fresh fruits and vegetables, whole grains, and low-fat or nonfat dairy products. Eating a wide variety of foods provides the nutrients your body needs. It can also keep you from getting bored with your meal plan.  Learn about carbohydrates, fats, and protein  · Carbohydrates are starches, sugars, and fiber. They are found in many foods, including fruit, bread, pasta, milk, and sweets. Of all the foods you eat, carbohydrates have the most effect on your blood sugar. Your dietitian may teach you about carb counting, a way to figure out the number of carbohydrates in a meal.  · Fats have the most calories. They also have the most effect on your weight and your risk of heart disease. When you have diabetes, its important to control your weight and protect your heart. Foods that are high in fat include whole milk, cheese, snack foods, and desserts.  · Protein is important for building and repairing muscles and bones. Choose low-fat protein sources, such as fish, egg whites, and skinless chicken.  Reduce liquid sugars  Extra calories from sodas, sports drinks, and fruit drinks make it hard to keep blood sugar in range. Cut as many liquid sugars from your meal plan as you can.  This includes most fruit juices, which are often high in natural or added sugar. Instead, drink plenty of water and other sugar-free beverages.  Eat less fat  If you need to lose weight, try to reduce the amount of fat in your diet. This can also help lower your cholesterol level to keep blood vessels healthier. Cut fat by using only small amounts of liquid oil for cooking. Read food labels carefully to avoid foods with unhealthy trans fats.  Timing your meals  When it comes to blood sugar control, when you eat is as important as what you eat. You may need to eat several small meals spaced evenly throughout the day to stay in your target range. So dont skip breakfast or wait until late in the day to get most  of your calories. Doing so can cause your blood sugar to rise too high or fall too low.   Date Last Reviewed: 3/1/2016  © 7641-2621 [employer], [employer] [address] [place] [address], [place] [address]. All rights reserved. This information is not intended as a substitute for professional medical care. Always follow your healthcare professional's instructions.        Planning for Travel When You Have Diabetes    Taking care of your diabetes means developing a routine for things like meals, exercising, and taking medicine. But sometimes this routine is disrupted when you travel. Your healthcare team can help you work out a plan to prepare for unexpected situations. The tips below can help.  When you travel  During your trip, stick to your meal and exercise plans as much as you can:  · Wear an ID necklace or bracelet that says you have diabetes.  · Keep your diabetes kit with you, not in your luggage.  · Pack double the supplies you think you will need. Try not to put them all in the same bag.  · On train, bus, or airplane trips, take a walk in the aisle at least every 2 hours.  · Always carry a source of fast-acting sugar with you, such as glucose tablets or hard candies, but not sugar-free candy.  · Carry extra snacks, such as crackers, cheese, or fruit, in case meals are delayed.  · Drink plenty of water, especially when traveling by air.  · If youre traveling across more than two time zones, ask your healthcare provider how to adjust your medicine or insulin schedule.  · If you are traveling by air, do not remove the prescription label from the insulin bottles and supplies. TSA security agents may want to see these to allow you to carry them through security check. Let the TSA screeners know if you are wearing an insulin pump before going through body scanners or personal screenings.   Be prepared  Tips to being prepared while traveling:  · Keep a diabetes kit. It should include your blood glucose meter,  batteries, test strips, lancing device, fast-acting sugar, extra medicine, syringes if needed, and copies of prescriptions. Use a case designed to carry diabetes supplies. Or use a makeup case, a belt pouch, or briefcase.  · Take your diabetes kit with you everywhere, just like you take your wallet and keys. Try to find a case for your insulin that is heat-resistant if you will be visiting a warm region.   · Wear a bracelet or necklace that says you have diabetes.  · Store supplies at work as well as at home.  · Carry your healthcare providers phone number with you.  Date Last Reviewed: 6/1/2016  © 6986-5055 [employer]. [address] [place] [address], [place] [address]. All rights reserved. This information is not intended as a substitute for professional medical care. Always follow your healthcare professional's instructions.        Diabetes: Keeping Feet Healthy     Have your feet checked every time you see your healthcare provider, and at least once a year.     Diabetes can damage nerves in your feet and cause neuropathy. This condition makes it hard for you to feel injuries or sore spots. Diabetes can also change blood flow, making it harder for small problems, like a blister, to heal properly. In fact, minor injuries can quickly become serious infections that send you to the hospital. Practice self-care to protect your feet and keep them healthy.   Take special care  Here are tips for taking special care of your feet:  · Inspect your feet daily for problems such as redness, blisters, cracks, dry skin, or numbness. Use a mirror to see the bottoms of your feet. Or, ask for help.  · Manage your diabetes. Monitor and control your blood sugar. Take all your medicines as prescribed.  · Avoid walking barefoot, even indoors. Always wear socks inside your shoes.   · Wash your feet with warm water and mild soap. Dry well, especially between toes.  · Dont treat corns or calluses yourself. Talk to your healthcare  provider or podiatrist (a healthcare provider who specializes in foot care) if you need assistance trimming your toenails.  · Use moisturizing cream or lotion if you have dry skin, but dont use it between toes.  · Dont use heating pads on your feet. If you have neuropathy, you could get a burn and not feel it.  · Stop smoking. Smoking restricts blood flow and can make it harder for wounds to heal.  · Don't use sharp blades to trim your nails. Use a nail clipper and file instead.   Have regular checkups  Foot problems can develop quickly. So be sure to follow your healthcare teams schedule for regular checkups. During office visits, take off your shoes and socks as soon as you get in the exam room. Ask your healthcare provider to examine your feet for problems. This will make it easier to find and treat small skin irritations before they get worse. Regular checkups can also help keep track of the blood flow and feeling in your feet. If you have neuropathy, you may need to have checkups more often.  Wear proper footwear  Wearing proper footwear is very important. If areas of your feet have been damaged by too much pressure, your healthcare provider may recommend changing your footwear. In some cases, avoiding high heels or tight work boots may be all thats needed. Or, your healthcare provider may recommend special shoes or custom inserts. These help protect your feet and keep existing irritations from getting worse. If you need special footwear, ask your healthcare provider if you qualify for Medicare's custom-molded and extra-depth diabetic shoe and insert program.   Make sure shoes and socks fit  Any pair of shoes--new or old--should feel comfortable as soon as you put them on. There shouldnt be any rubbing when you walk. Wear the right shoe for any activity. For instance, a running shoe is designed to keep your feet injury-free while jogging. Buy shoes at the end of the day, when your feet are larger. Make  sure they provide support without feeling too loose. Make sure your socks fit, too. Wear soft, seamless, well-padded socks for activity. Cotton or microfiber socks are best to help to absorb sweat. To protect your feet, avoid shoes that are open-toed or open-heeled. If you have questions about what kinds of shoes and socks are best, talk to your healthcare team.  Get regular exercise  Regular exercise improves blood flow in your feet. It also increases foot strength and flexibility. Gentle exercises, like walking or riding a stationary bicycle, are best. You can also do special foot exercises. Just be sure to talk with your healthcare provider before starting any exercise program. Also mention if any exercise causes pain, redness, or other signs of foot problems.     Note: If you have any kind of break in the skin of your foot or ankle, keep the area clean. Then call your healthcare provider--especially if the area doesnt appear to be healing.   Date Last Reviewed: 6/1/2016  © 8469-3333 [employer], [employer]. [address] [place] [address]. All rights reserved. This information is not intended as a substitute for professional medical care. Always follow your healthcare professional's instructions.         2% wipes

## 2024-01-15 ENCOUNTER — NON-APPOINTMENT (OUTPATIENT)
Age: 74
End: 2024-01-15

## 2024-01-15 DIAGNOSIS — I60.9 NONTRAUMATIC SUBARACHNOID HEMORRHAGE, UNSPECIFIED: ICD-10-CM

## 2024-01-15 LAB
ANION GAP SERPL CALC-SCNC: 10 MMOL/L — SIGNIFICANT CHANGE UP (ref 5–17)
ANION GAP SERPL CALC-SCNC: 10 MMOL/L — SIGNIFICANT CHANGE UP (ref 5–17)
BUN SERPL-MCNC: 10 MG/DL — SIGNIFICANT CHANGE UP (ref 7–23)
BUN SERPL-MCNC: 10 MG/DL — SIGNIFICANT CHANGE UP (ref 7–23)
CALCIUM SERPL-MCNC: 9.9 MG/DL — SIGNIFICANT CHANGE UP (ref 8.4–10.5)
CALCIUM SERPL-MCNC: 9.9 MG/DL — SIGNIFICANT CHANGE UP (ref 8.4–10.5)
CHLORIDE SERPL-SCNC: 108 MMOL/L — SIGNIFICANT CHANGE UP (ref 96–108)
CHLORIDE SERPL-SCNC: 108 MMOL/L — SIGNIFICANT CHANGE UP (ref 96–108)
CO2 SERPL-SCNC: 22 MMOL/L — SIGNIFICANT CHANGE UP (ref 22–31)
CO2 SERPL-SCNC: 22 MMOL/L — SIGNIFICANT CHANGE UP (ref 22–31)
CREAT SERPL-MCNC: 0.82 MG/DL — SIGNIFICANT CHANGE UP (ref 0.5–1.3)
CREAT SERPL-MCNC: 0.82 MG/DL — SIGNIFICANT CHANGE UP (ref 0.5–1.3)
EGFR: 76 ML/MIN/1.73M2 — SIGNIFICANT CHANGE UP
EGFR: 76 ML/MIN/1.73M2 — SIGNIFICANT CHANGE UP
GLUCOSE SERPL-MCNC: 104 MG/DL — HIGH (ref 70–99)
GLUCOSE SERPL-MCNC: 104 MG/DL — HIGH (ref 70–99)
MAGNESIUM SERPL-MCNC: 1.9 MG/DL — SIGNIFICANT CHANGE UP (ref 1.6–2.6)
MAGNESIUM SERPL-MCNC: 1.9 MG/DL — SIGNIFICANT CHANGE UP (ref 1.6–2.6)
PHOSPHATE SERPL-MCNC: 3.5 MG/DL — SIGNIFICANT CHANGE UP (ref 2.5–4.5)
PHOSPHATE SERPL-MCNC: 3.5 MG/DL — SIGNIFICANT CHANGE UP (ref 2.5–4.5)
POTASSIUM SERPL-MCNC: 4.1 MMOL/L — SIGNIFICANT CHANGE UP (ref 3.5–5.3)
POTASSIUM SERPL-MCNC: 4.1 MMOL/L — SIGNIFICANT CHANGE UP (ref 3.5–5.3)
POTASSIUM SERPL-SCNC: 4.1 MMOL/L — SIGNIFICANT CHANGE UP (ref 3.5–5.3)
POTASSIUM SERPL-SCNC: 4.1 MMOL/L — SIGNIFICANT CHANGE UP (ref 3.5–5.3)
SODIUM SERPL-SCNC: 140 MMOL/L — SIGNIFICANT CHANGE UP (ref 135–145)
SODIUM SERPL-SCNC: 140 MMOL/L — SIGNIFICANT CHANGE UP (ref 135–145)

## 2024-01-15 PROCEDURE — 99232 SBSQ HOSP IP/OBS MODERATE 35: CPT

## 2024-01-15 RX ORDER — MAGNESIUM SULFATE 500 MG/ML
1 VIAL (ML) INJECTION ONCE
Refills: 0 | Status: COMPLETED | OUTPATIENT
Start: 2024-01-15 | End: 2024-01-15

## 2024-01-15 RX ORDER — SODIUM CHLORIDE 9 MG/ML
1000 INJECTION INTRAMUSCULAR; INTRAVENOUS; SUBCUTANEOUS ONCE
Refills: 0 | Status: COMPLETED | OUTPATIENT
Start: 2024-01-15 | End: 2024-01-15

## 2024-01-15 RX ORDER — ACETAMINOPHEN 500 MG
1000 TABLET ORAL ONCE
Refills: 0 | Status: COMPLETED | OUTPATIENT
Start: 2024-01-15 | End: 2024-01-15

## 2024-01-15 RX ORDER — ACETAMINOPHEN 500 MG
650 TABLET ORAL EVERY 6 HOURS
Refills: 0 | Status: DISCONTINUED | OUTPATIENT
Start: 2024-01-15 | End: 2024-01-18

## 2024-01-15 RX ORDER — SODIUM CHLORIDE 9 MG/ML
1000 INJECTION INTRAMUSCULAR; INTRAVENOUS; SUBCUTANEOUS
Refills: 0 | Status: DISCONTINUED | OUTPATIENT
Start: 2024-01-15 | End: 2024-01-17

## 2024-01-15 RX ADMIN — Medication 100 GRAM(S): at 03:23

## 2024-01-15 RX ADMIN — NIMODIPINE 30 MILLIGRAM(S): 60 SOLUTION ORAL at 14:52

## 2024-01-15 RX ADMIN — NIMODIPINE 30 MILLIGRAM(S): 60 SOLUTION ORAL at 12:29

## 2024-01-15 RX ADMIN — ATORVASTATIN CALCIUM 20 MILLIGRAM(S): 80 TABLET, FILM COATED ORAL at 21:48

## 2024-01-15 RX ADMIN — Medication 30 MILLILITER(S): at 15:30

## 2024-01-15 RX ADMIN — Medication 12.5 MILLIGRAM(S): at 17:27

## 2024-01-15 RX ADMIN — NIMODIPINE 30 MILLIGRAM(S): 60 SOLUTION ORAL at 21:48

## 2024-01-15 RX ADMIN — NIMODIPINE 30 MILLIGRAM(S): 60 SOLUTION ORAL at 08:47

## 2024-01-15 RX ADMIN — Medication 400 MILLIGRAM(S): at 08:30

## 2024-01-15 RX ADMIN — LEVETIRACETAM 500 MILLIGRAM(S): 250 TABLET, FILM COATED ORAL at 05:51

## 2024-01-15 RX ADMIN — FAMOTIDINE 20 MILLIGRAM(S): 10 INJECTION INTRAVENOUS at 12:31

## 2024-01-15 RX ADMIN — CHLORHEXIDINE GLUCONATE 1 APPLICATION(S): 213 SOLUTION TOPICAL at 21:48

## 2024-01-15 RX ADMIN — NIMODIPINE 30 MILLIGRAM(S): 60 SOLUTION ORAL at 03:20

## 2024-01-15 RX ADMIN — NIMODIPINE 30 MILLIGRAM(S): 60 SOLUTION ORAL at 16:26

## 2024-01-15 RX ADMIN — Medication 1000 MILLIGRAM(S): at 21:00

## 2024-01-15 RX ADMIN — SODIUM CHLORIDE 1000 MILLILITER(S): 9 INJECTION INTRAMUSCULAR; INTRAVENOUS; SUBCUTANEOUS at 01:40

## 2024-01-15 RX ADMIN — NIMODIPINE 30 MILLIGRAM(S): 60 SOLUTION ORAL at 20:32

## 2024-01-15 RX ADMIN — LEVETIRACETAM 500 MILLIGRAM(S): 250 TABLET, FILM COATED ORAL at 17:27

## 2024-01-15 RX ADMIN — DULOXETINE HYDROCHLORIDE 60 MILLIGRAM(S): 30 CAPSULE, DELAYED RELEASE ORAL at 12:31

## 2024-01-15 RX ADMIN — Medication 1000 MILLIGRAM(S): at 09:00

## 2024-01-15 RX ADMIN — NIMODIPINE 30 MILLIGRAM(S): 60 SOLUTION ORAL at 00:50

## 2024-01-15 RX ADMIN — Medication 400 MILLIGRAM(S): at 20:31

## 2024-01-15 RX ADMIN — Medication 50 MICROGRAM(S): at 05:51

## 2024-01-15 NOTE — PROGRESS NOTE ADULT - SUBJECTIVE AND OBJECTIVE BOX
Interval events:  No acute events on evening rounds.    VITALS:  T(C): , Max: 37.1 (01-15-24 @ 15:00)  HR:  (55 - 100)  BP:  (104/59 - 104/59)  ABP:  (92/49 - 144/70)  RR:  (13 - 21)  SpO2:  (94% - 100%)  Wt(kg): --      01-14-24 @ 07:01  -  01-15-24 @ 07:00  --------------------------------------------------------  IN: 2070 mL / OUT: 950 mL / NET: 1120 mL    01-15-24 @ 07:01  -  01-15-24 @ 21:37  --------------------------------------------------------  IN: 460 mL / OUT: 0 mL / NET: 460 mL      LABS:  Na: 140 (01-14 @ 23:34), 138 (01-14 @ 00:47)  K: 4.1 (01-14 @ 23:34), 4.0 (01-14 @ 00:47)  Cl: 108 (01-14 @ 23:34), 107 (01-14 @ 00:47)  CO2: 22 (01-14 @ 23:34), 18 (01-14 @ 00:47)  BUN: 10 (01-14 @ 23:34), 9 (01-14 @ 00:47)  Cr: 0.82 (01-14 @ 23:34), 0.65 (01-14 @ 00:47)  Glu: 104(01-14 @ 23:34), 96(01-14 @ 00:47)    Hgb: 13.1 (01-14 @ 23:34), 13.4 (01-12 @ 21:42)  Hct: 37.7 (01-14 @ 23:34), 38.3 (01-12 @ 21:42)  WBC: 11.66 (01-14 @ 23:34), 13.24 (01-12 @ 21:42)  Plt: 242 (01-14 @ 23:34), 235 (01-12 @ 21:42)    MEDICATIONS:  acetaminophen     Tablet .. 650 milliGRAM(s) Oral every 6 hours PRN  aluminum hydroxide/magnesium hydroxide/simethicone Suspension 30 milliLiter(s) Oral every 4 hours PRN  atorvastatin 20 milliGRAM(s) Oral at bedtime  chlorhexidine 4% Liquid 1 Application(s) Topical daily  DULoxetine 60 milliGRAM(s) Oral daily  famotidine    Tablet 20 milliGRAM(s) Oral daily  levETIRAcetam 500 milliGRAM(s) Oral two times a day  levothyroxine 50 MICROGram(s) Oral daily  loperamide 4 milliGRAM(s) Oral daily PRN  metoprolol tartrate 12.5 milliGRAM(s) Oral every 12 hours  niMODipine Oral Solution 30 milliGRAM(s) Oral every 2 hours  sodium chloride 0.9%. 1000 milliLiter(s) IV Continuous <Continuous>    EXAMINATION:  Please see exam from daytime.     Assessment/Plan:   74 yo woman with h/o HTN, HLD, hypothyroidism, had severe sudden headache on 1/6 with worsening on 1/11. 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     No change to plan from daytime  angiogram tomorrow  c/w Keppra ppx  NPO for angio tomorrow, on NS at 75cc/hr   off DVT ppx for now for SAH/IPH c/f AVF    Candida Coto  Neurocritical Care Attending

## 2024-01-15 NOTE — PROGRESS NOTE ADULT - ATTENDING COMMENTS
Subjective:     Los Cárdenas is a 7 y.o. male here with mother. Patient brought in for Otalgia (Has been swimming and now has been having ear pain off and on)      History of Present Illness:  Pt with c/o left ear pain off and on for 2 weeks  Some uri symptoms off and on, getting better. Now with just cough.       Review of Systems   Constitutional:  Negative for activity change, appetite change, fatigue, fever and unexpected weight change.   HENT:  Positive for ear pain. Negative for congestion, dental problem, nosebleeds, rhinorrhea and sneezing.    Respiratory:  Negative for cough.    Cardiovascular:  Negative for chest pain.   Gastrointestinal:  Negative for abdominal pain, constipation and diarrhea.   Genitourinary:  Negative for difficulty urinating.   Neurological:  Negative for weakness and headaches.   Hematological:  Negative for adenopathy.   Psychiatric/Behavioral:  Negative for behavioral problems, decreased concentration and sleep disturbance. The patient is not nervous/anxious and is not hyperactive.        Objective:     Physical Exam  Constitutional:       Appearance: He is well-developed.   HENT:      Right Ear: Tympanic membrane normal.      Left Ear: Tympanic membrane normal. There is impacted cerumen.      Ears:      Comments: Cerumen removed with a lighted curette then irrigated multiple times, tolerated procedure well.  Canal and Tm inflamed      Nose: Nose normal.      Mouth/Throat:      Mouth: Mucous membranes are moist.      Pharynx: Oropharynx is clear.   Eyes:      Conjunctiva/sclera: Conjunctivae normal.      Pupils: Pupils are equal, round, and reactive to light.   Cardiovascular:      Rate and Rhythm: Normal rate and regular rhythm.   Pulmonary:      Effort: Pulmonary effort is normal.      Breath sounds: Normal breath sounds.   Musculoskeletal:         General: Normal range of motion.   Skin:     General: Skin is warm.   Neurological:      Mental Status: He is alert.          Assessment:     1. Acute otitis externa of left ear, unspecified type    2. Impacted cerumen of left ear        Plan:     Los was seen today for otalgia.    Diagnoses and all orders for this visit:    Acute otitis externa of left ear, unspecified type    Impacted cerumen of left ear  -     Nursing communication    Other orders  -     neomycin-polymyxin-hydrocortisone (CORTISPORIN) otic solution; Place 3 drops into the left ear 3 (three) times daily. for 7 days      Patient Instructions   Recommend using ear drops 3x/day for 7 days  Ok to give tylenol or ibuprofen as needed for pain or fever, alternate every 3 hours if needed  Discussed management of wax build up         74yo woman tx from [place] SHENA frontal IPH and SAH   CTA c/f AVF  intact exam  po tylenol prn for HA   nimodipine held for hypotension  Mg repleted    SBP ; d/c lisinopril, decrease metoprolol 12.5mg Q12  L axillary angelica   Q4 checks   OOB as tolerated  angiogram per neuroIR ? Tuesday  keppra for seizure ppx   RA  regular diet   immodium prn   cont synthroid   BLE dopplers 1/13 negative   off DVT ppx for now for SAH/IPH c/f AVF  afebrile 72yo woman tx from [facility] SHENA frontal IPH and SAH   CTA c/f AVF  intact exam  po tylenol prn for HA   nimodipine held for hypotension  Mg repleted    SBP ; d/c lisinopril, decrease metoprolol 12.5mg Q12  L axillary angelica   Q4 checks   OOB as tolerated  angiogram per neuroIR ? Tuesday  keppra for seizure ppx   RA  regular diet   immodium prn   cont synthroid   BLE dopplers 1/13 negative   off DVT ppx for now for SAH/IPH c/f AVF  afebrile 74yo woman tx from [facility] SHENA frontal IPH and SAH   CTA c/f AVF  intact exam  po tylenol prn for HA   nimodipine held for hypotension  Mg repleted    SBP ; d/c lisinopril, decrease metoprolol 12.5mg Q12  L axillary angelica   Q4 checks   OOB as tolerated  angiogram per neuroIR ? Tuesday  keppra for seizure ppx   RA  regular diet   immodium prn   cont synthroid   BLE dopplers 1/13 negative   off DVT ppx for now for SAH/IPH c/f AVF  afebrile

## 2024-01-15 NOTE — PROGRESS NOTE ADULT - SUBJECTIVE AND OBJECTIVE BOX
Patient seen and examined at bedside.    --Anticoagulation--    T(C): 36.8 (01-14-24 @ 23:00), Max: 36.9 (01-14-24 @ 03:00)  HR: 62 (01-15-24 @ 01:00) (55 - 94)  BP: 104/88 (01-14-24 @ 17:00) (92/66 - 137/67)  RR: 21 (01-15-24 @ 01:00) (11 - 23)  SpO2: 96% (01-15-24 @ 01:00) (94% - 99%)  Wt(kg): --    Exam: intact

## 2024-01-15 NOTE — PROGRESS NOTE ADULT - ASSESSMENT
Juliane Negrete  73F Hx HTN, HLD, hypothyroid, presenting as xfer 1/12 [facility] s/p WHOL Saturday 1/6. CTH at OSH r/f IPH/SAH. MRI brain (-) for any underlying mass. CTA showed subtle prominent vessel in Lt IPH c/f possible AVF w/ punctate aneurysm in IPH-territory. Coags wnl. Exam: intact    Preop Angio Tuesday  LED-p  TCDs daily  Holding SQL       Juliane Negrete  73F Hx HTN, HLD, hypothyroid, presenting as xfer 1/12 [facility] s/p WHOL Saturday 1/6. CTH at OSH r/f IPH/SAH. MRI brain (-) for any underlying mass. CTA showed subtle prominent vessel in Lt IPH c/f possible AVF w/ punctate aneurysm in IPH-territory. Coags wnl. Exam: intact    Preop Angio Tuesday  LED-p  TCDs daily  Holding SQL       Juliane Negrete  73F Hx HTN, HLD, hypothyroid, presenting as xfer 1/12 [facility] s/p WHOL Saturday 1/6. CTH at OSH r/f IPH/SAH. MRI brain (-) for any underlying mass. CTA showed subtle prominent vessel in Lt IPH c/f possible AVF w/ punctate aneurysm in IPH-territory. Coags wnl. Exam: intact    Preop Angio Tuesday  LED-p  TCDs daily  Holding SQL

## 2024-01-15 NOTE — PROGRESS NOTE ADULT - SUBJECTIVE AND OBJECTIVE BOX
HPI:  74 yo woman with PMH of HTN, HLD, hypothyroidism, had severe sudden headache on Saturday while [employer]. Her son took her to to [facility] yesterday and was found to have left IPH and SAH. MRI brain no underlying mass, CTA head ? 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 patient transferred to [facility] for angio     24 hour Events:  1/14- No events. Patient's examination stable      Allergies    sulfa drugs (Hives)    Intolerances    opioid-like analgesics (Vomiting)      REVIEW OF SYSTEMS: [ ] Unable to Assess due to neurologic exam   [x] All ROS addressed below are non-contributory, except:  Neuro: [x] Headache [ ] Back pain [ ] Numbness [ ] Weakness [ ] Ataxia [ ] Dizziness [ ] Aphasia [ ] Dysarthria [ ] Visual disturbance  Resp: [ ] Shortness of breath/dyspnea, [ ] Orthopnea [ ] Cough  CV: [ ] Chest pain [ ] Palpitation [ ] Lightheadedness [ ] Syncope  Renal: [ ] Thirst [ ] Edema  GI: [ ] Nausea [ ] Emesis [ ] Abdominal pain [ ] Constipation [ ] Diarrhea  Hem: [ ] Hematemesis [ ] bright red blood per rectum  ID: [ ] Fever [ ] Chills [ ] Dysuria  ENT: [ ] Rhinorrhea      DEVICES:   [ ] Restraints [ ] ET tube [ ] central line [ ] arterial line [ ] hicks [ ] NGT/OGT [ ] EVD [ ] LD [ ] JAYSON/HMV [ ] Trach [ ] PEG [ ] Chest Tube     VITALS:   Vital Signs Last 24 Hrs  T(C): 36.9 (13 Jan 2024 23:00), Max: 36.9 (13 Jan 2024 03:00)  T(F): 98.5 (13 Jan 2024 23:00), Max: 98.5 (13 Jan 2024 03:00)  HR: 115 (14 Jan 2024 00:00) (53 - 115)  BP: 136/68 (14 Jan 2024 00:00) (94/82 - 156/70)  BP(mean): 86 (14 Jan 2024 00:00) (70 - 113)  RR: 17 (14 Jan 2024 00:00) (13 - 27)  SpO2: 95% (14 Jan 2024 00:00) (92% - 100%)    Parameters below as of 13 Jan 2024 12:39  Patient On (Oxygen Delivery Method): room air      CAPILLARY BLOOD GLUCOSE      POCT Blood Glucose.: 90 mg/dL (13 Jan 2024 22:16)  POCT Blood Glucose.: 91 mg/dL (13 Jan 2024 17:01)  POCT Blood Glucose.: 90 mg/dL (13 Jan 2024 11:58)  POCT Blood Glucose.: 93 mg/dL (13 Jan 2024 06:38)    I&O's Summary    12 Jan 2024 07:01  -  13 Jan 2024 07:00  --------------------------------------------------------  IN: 2207.3 mL / OUT: 1100 mL / NET: 1107.3 mL    13 Jan 2024 07:01  -  14 Jan 2024 00:49  --------------------------------------------------------  IN: 830 mL / OUT: 1100 mL / NET: -270 mL        Respiratory:        LABS:                        13.4   13.24 )-----------( 235      ( 12 Jan 2024 21:42 )             38.3     01-12    138  |  101  |  7   ----------------------------<  99  3.5   |  24  |  0.61             MEDICATION LEVELS:     IVF FLUIDS/MEDICATIONS:   MEDICATIONS  (STANDING):  atorvastatin 20 milliGRAM(s) Oral at bedtime  chlorhexidine 4% Liquid 1 Application(s) Topical daily  DULoxetine 60 milliGRAM(s) Oral daily  famotidine    Tablet 20 milliGRAM(s) Oral daily  insulin lispro (ADMELOG) corrective regimen sliding scale   SubCutaneous Before meals and at bedtime  levETIRAcetam 500 milliGRAM(s) Oral two times a day  levothyroxine 50 MICROGram(s) Oral daily  metoprolol tartrate 12.5 milliGRAM(s) Oral every 12 hours  niMODipine Oral Solution 30 milliGRAM(s) Oral every 2 hours  polyethylene glycol 3350 17 Gram(s) Oral daily  senna 2 Tablet(s) Oral at bedtime    MEDICATIONS  (PRN):        IMAGING:      EXAMINATION:  PHYSICAL EXAM:    Constitutional: No Acute Distress     Neurological: Awake, alert oriented to person, place and time, Following Commands, PERRL, EOMI, No Gaze Preference, Face Symmetrical, Speech Fluent, No dysmetria, No ataxia, No nystagmus     Motor exam:          Upper extremity                         Delt     Bicep     Tricep    HG                                                 R         5/5 5/5 5/5 5/5                                               L          5/5 5/5 5/5 5/5          Lower extremity                        HF         KF        KE       DF         PF                                                  R        5/5 5/5 5/5 5/5         5/5                                               L         5/5 5/5 5/5 5/5          5/5                                                 Sensation: [x] intact to light touch  [ ] decreased:     Pulmonary: Clear to Auscultation, No rales, No rhonchi, No wheezes     Cardiovascular: S1, S2, Regular rate and rhythm     Gastrointestinal: Soft, Non-tender, Non-distended     Extremities: No calf tenderness     Incision:    HPI:  72 yo woman with PMH of HTN, HLD, hypothyroidism, had severe sudden headache on Saturday while [employer]. Her son took her to to [facility] yesterday and was found to have left IPH and SAH. MRI brain no underlying mass, CTA head ? 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 patient transferred to [facility] for angio     24 hour Events:  1/14- No events. Patient's examination stable      Allergies    sulfa drugs (Hives)    Intolerances    opioid-like analgesics (Vomiting)      REVIEW OF SYSTEMS: [ ] Unable to Assess due to neurologic exam   [x] All ROS addressed below are non-contributory, except:  Neuro: [x] Headache [ ] Back pain [ ] Numbness [ ] Weakness [ ] Ataxia [ ] Dizziness [ ] Aphasia [ ] Dysarthria [ ] Visual disturbance  Resp: [ ] Shortness of breath/dyspnea, [ ] Orthopnea [ ] Cough  CV: [ ] Chest pain [ ] Palpitation [ ] Lightheadedness [ ] Syncope  Renal: [ ] Thirst [ ] Edema  GI: [ ] Nausea [ ] Emesis [ ] Abdominal pain [ ] Constipation [ ] Diarrhea  Hem: [ ] Hematemesis [ ] bright red blood per rectum  ID: [ ] Fever [ ] Chills [ ] Dysuria  ENT: [ ] Rhinorrhea      DEVICES:   [ ] Restraints [ ] ET tube [ ] central line [ ] arterial line [ ] hicks [ ] NGT/OGT [ ] EVD [ ] LD [ ] JAYSON/HMV [ ] Trach [ ] PEG [ ] Chest Tube     VITALS:   Vital Signs Last 24 Hrs  T(C): 36.9 (13 Jan 2024 23:00), Max: 36.9 (13 Jan 2024 03:00)  T(F): 98.5 (13 Jan 2024 23:00), Max: 98.5 (13 Jan 2024 03:00)  HR: 115 (14 Jan 2024 00:00) (53 - 115)  BP: 136/68 (14 Jan 2024 00:00) (94/82 - 156/70)  BP(mean): 86 (14 Jan 2024 00:00) (70 - 113)  RR: 17 (14 Jan 2024 00:00) (13 - 27)  SpO2: 95% (14 Jan 2024 00:00) (92% - 100%)    Parameters below as of 13 Jan 2024 12:39  Patient On (Oxygen Delivery Method): room air      CAPILLARY BLOOD GLUCOSE      POCT Blood Glucose.: 90 mg/dL (13 Jan 2024 22:16)  POCT Blood Glucose.: 91 mg/dL (13 Jan 2024 17:01)  POCT Blood Glucose.: 90 mg/dL (13 Jan 2024 11:58)  POCT Blood Glucose.: 93 mg/dL (13 Jan 2024 06:38)    I&O's Summary    12 Jan 2024 07:01  -  13 Jan 2024 07:00  --------------------------------------------------------  IN: 2207.3 mL / OUT: 1100 mL / NET: 1107.3 mL    13 Jan 2024 07:01  -  14 Jan 2024 00:49  --------------------------------------------------------  IN: 830 mL / OUT: 1100 mL / NET: -270 mL        Respiratory:        LABS:                        13.4   13.24 )-----------( 235      ( 12 Jan 2024 21:42 )             38.3     01-12    138  |  101  |  7   ----------------------------<  99  3.5   |  24  |  0.61             MEDICATION LEVELS:     IVF FLUIDS/MEDICATIONS:   MEDICATIONS  (STANDING):  atorvastatin 20 milliGRAM(s) Oral at bedtime  chlorhexidine 4% Liquid 1 Application(s) Topical daily  DULoxetine 60 milliGRAM(s) Oral daily  famotidine    Tablet 20 milliGRAM(s) Oral daily  insulin lispro (ADMELOG) corrective regimen sliding scale   SubCutaneous Before meals and at bedtime  levETIRAcetam 500 milliGRAM(s) Oral two times a day  levothyroxine 50 MICROGram(s) Oral daily  metoprolol tartrate 12.5 milliGRAM(s) Oral every 12 hours  niMODipine Oral Solution 30 milliGRAM(s) Oral every 2 hours  polyethylene glycol 3350 17 Gram(s) Oral daily  senna 2 Tablet(s) Oral at bedtime    MEDICATIONS  (PRN):        IMAGING:      EXAMINATION:  PHYSICAL EXAM:    Constitutional: No Acute Distress     Neurological: Awake, alert oriented to person, place and time, Following Commands, PERRL, EOMI, No Gaze Preference, Face Symmetrical, Speech Fluent, No dysmetria, No ataxia, No nystagmus     Motor exam:          Upper extremity                         Delt     Bicep     Tricep    HG                                                 R         5/5 5/5 5/5 5/5                                               L          5/5 5/5 5/5 5/5          Lower extremity                        HF         KF        KE       DF         PF                                                  R        5/5 5/5 5/5 5/5         5/5                                               L         5/5 5/5 5/5 5/5          5/5                                                 Sensation: [x] intact to light touch  [ ] decreased:     Pulmonary: Clear to Auscultation, No rales, No rhonchi, No wheezes     Cardiovascular: S1, S2, Regular rate and rhythm     Gastrointestinal: Soft, Non-tender, Non-distended     Extremities: No calf tenderness     Incision:    HPI:  74 yo woman with PMH of HTN, HLD, hypothyroidism, had severe sudden headache on Saturday while [employer]. Her son took her to to [facility] yesterday and was found to have left IPH and SAH. MRI brain no underlying mass, CTA head ? 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 patient transferred to [facility] for angio     24 hour Events:  1/14- No events. Patient's examination stable      Allergies    sulfa drugs (Hives)    Intolerances    opioid-like analgesics (Vomiting)      REVIEW OF SYSTEMS: [ ] Unable to Assess due to neurologic exam   [x] All ROS addressed below are non-contributory, except:  Neuro: [x] Headache [ ] Back pain [ ] Numbness [ ] Weakness [ ] Ataxia [ ] Dizziness [ ] Aphasia [ ] Dysarthria [ ] Visual disturbance  Resp: [ ] Shortness of breath/dyspnea, [ ] Orthopnea [ ] Cough  CV: [ ] Chest pain [ ] Palpitation [ ] Lightheadedness [ ] Syncope  Renal: [ ] Thirst [ ] Edema  GI: [ ] Nausea [ ] Emesis [ ] Abdominal pain [ ] Constipation [ ] Diarrhea  Hem: [ ] Hematemesis [ ] bright red blood per rectum  ID: [ ] Fever [ ] Chills [ ] Dysuria  ENT: [ ] Rhinorrhea      DEVICES:   [ ] Restraints [ ] ET tube [ ] central line [ ] arterial line [ ] hicks [ ] NGT/OGT [ ] EVD [ ] LD [ ] JAYSON/HMV [ ] Trach [ ] PEG [ ] Chest Tube     VITALS:   Vital Signs Last 24 Hrs  T(C): 36.9 (13 Jan 2024 23:00), Max: 36.9 (13 Jan 2024 03:00)  T(F): 98.5 (13 Jan 2024 23:00), Max: 98.5 (13 Jan 2024 03:00)  HR: 115 (14 Jan 2024 00:00) (53 - 115)  BP: 136/68 (14 Jan 2024 00:00) (94/82 - 156/70)  BP(mean): 86 (14 Jan 2024 00:00) (70 - 113)  RR: 17 (14 Jan 2024 00:00) (13 - 27)  SpO2: 95% (14 Jan 2024 00:00) (92% - 100%)    Parameters below as of 13 Jan 2024 12:39  Patient On (Oxygen Delivery Method): room air      CAPILLARY BLOOD GLUCOSE      POCT Blood Glucose.: 90 mg/dL (13 Jan 2024 22:16)  POCT Blood Glucose.: 91 mg/dL (13 Jan 2024 17:01)  POCT Blood Glucose.: 90 mg/dL (13 Jan 2024 11:58)  POCT Blood Glucose.: 93 mg/dL (13 Jan 2024 06:38)    I&O's Summary    12 Jan 2024 07:01  -  13 Jan 2024 07:00  --------------------------------------------------------  IN: 2207.3 mL / OUT: 1100 mL / NET: 1107.3 mL    13 Jan 2024 07:01  -  14 Jan 2024 00:49  --------------------------------------------------------  IN: 830 mL / OUT: 1100 mL / NET: -270 mL        Respiratory:        LABS:                        13.4   13.24 )-----------( 235      ( 12 Jan 2024 21:42 )             38.3     01-12    138  |  101  |  7   ----------------------------<  99  3.5   |  24  |  0.61             MEDICATION LEVELS:     IVF FLUIDS/MEDICATIONS:   MEDICATIONS  (STANDING):  atorvastatin 20 milliGRAM(s) Oral at bedtime  chlorhexidine 4% Liquid 1 Application(s) Topical daily  DULoxetine 60 milliGRAM(s) Oral daily  famotidine    Tablet 20 milliGRAM(s) Oral daily  insulin lispro (ADMELOG) corrective regimen sliding scale   SubCutaneous Before meals and at bedtime  levETIRAcetam 500 milliGRAM(s) Oral two times a day  levothyroxine 50 MICROGram(s) Oral daily  metoprolol tartrate 12.5 milliGRAM(s) Oral every 12 hours  niMODipine Oral Solution 30 milliGRAM(s) Oral every 2 hours  polyethylene glycol 3350 17 Gram(s) Oral daily  senna 2 Tablet(s) Oral at bedtime    MEDICATIONS  (PRN):        IMAGING:      EXAMINATION:  PHYSICAL EXAM:    Constitutional: No Acute Distress     Neurological: Awake, alert oriented to person, place and time, Following Commands, PERRL, EOMI, No Gaze Preference, Face Symmetrical, Speech Fluent, No dysmetria, No ataxia, No nystagmus     Motor exam:          Upper extremity                         Delt     Bicep     Tricep    HG                                                 R         5/5 5/5 5/5 5/5                                               L          5/5 5/5 5/5 5/5          Lower extremity                        HF         KF        KE       DF         PF                                                  R        5/5 5/5 5/5 5/5         5/5                                               L         5/5 5/5 5/5 5/5          5/5                                                 Sensation: [x] intact to light touch  [ ] decreased:     Pulmonary: Clear to Auscultation, No rales, No rhonchi, No wheezes     Cardiovascular: S1, S2, Regular rate and rhythm     Gastrointestinal: Soft, Non-tender, Non-distended     Extremities: No calf tenderness     Incision:    HPI:  74 yo woman with PMH of HTN, HLD, hypothyroidism, had severe sudden headache on Saturday while [employer]. Her son took her to to [facility] yesterday and was found to have left IPH and SAH. MRI brain no underlying mass, CTA head ? 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 patient transferred to [facility] for angio     24 hour Events:  1/14- No events. Patient's examination stable    1/15 no events    Allergies    sulfa drugs (Hives)    Intolerances    opioid-like analgesics (Vomiting)      REVIEW OF SYSTEMS: [ ] Unable to Assess due to neurologic exam   [x] All ROS addressed below are non-contributory, except:  Neuro: [x] Headache [ ] Back pain [ ] Numbness [ ] Weakness [ ] Ataxia [ ] Dizziness [ ] Aphasia [ ] Dysarthria [ ] Visual disturbance  Resp: [ ] Shortness of breath/dyspnea, [ ] Orthopnea [ ] Cough  CV: [ ] Chest pain [ ] Palpitation [ ] Lightheadedness [ ] Syncope  Renal: [ ] Thirst [ ] Edema  GI: [ ] Nausea [ ] Emesis [ ] Abdominal pain [ ] Constipation [ ] Diarrhea  Hem: [ ] Hematemesis [ ] bright red blood per rectum  ID: [ ] Fever [ ] Chills [ ] Dysuria  ENT: [ ] Rhinorrhea      DEVICES:   [ ] Restraints [ ] ET tube [ ] central line [ ] arterial line [ ] hicks [ ] NGT/OGT [ ] EVD [ ] LD [ ] JAYSON/HMV [ ] Trach [ ] PEG [ ] Chest Tube     VITALS:   Vital Signs Last 24 Hrs  T(C): 36.9 (13 Jan 2024 23:00), Max: 36.9 (13 Jan 2024 03:00)  T(F): 98.5 (13 Jan 2024 23:00), Max: 98.5 (13 Jan 2024 03:00)  HR: 115 (14 Jan 2024 00:00) (53 - 115)  BP: 136/68 (14 Jan 2024 00:00) (94/82 - 156/70)  BP(mean): 86 (14 Jan 2024 00:00) (70 - 113)  RR: 17 (14 Jan 2024 00:00) (13 - 27)  SpO2: 95% (14 Jan 2024 00:00) (92% - 100%)    Parameters below as of 13 Jan 2024 12:39  Patient On (Oxygen Delivery Method): room air      CAPILLARY BLOOD GLUCOSE      POCT Blood Glucose.: 90 mg/dL (13 Jan 2024 22:16)  POCT Blood Glucose.: 91 mg/dL (13 Jan 2024 17:01)  POCT Blood Glucose.: 90 mg/dL (13 Jan 2024 11:58)  POCT Blood Glucose.: 93 mg/dL (13 Jan 2024 06:38)    I&O's Summary    12 Jan 2024 07:01  -  13 Jan 2024 07:00  --------------------------------------------------------  IN: 2207.3 mL / OUT: 1100 mL / NET: 1107.3 mL    13 Jan 2024 07:01  -  14 Jan 2024 00:49  --------------------------------------------------------  IN: 830 mL / OUT: 1100 mL / NET: -270 mL        Respiratory:        LABS:                        13.4   13.24 )-----------( 235      ( 12 Jan 2024 21:42 )             38.3     01-12    138  |  101  |  7   ----------------------------<  99  3.5   |  24  |  0.61             MEDICATION LEVELS:     IVF FLUIDS/MEDICATIONS:   MEDICATIONS  (STANDING):  atorvastatin 20 milliGRAM(s) Oral at bedtime  chlorhexidine 4% Liquid 1 Application(s) Topical daily  DULoxetine 60 milliGRAM(s) Oral daily  famotidine    Tablet 20 milliGRAM(s) Oral daily  insulin lispro (ADMELOG) corrective regimen sliding scale   SubCutaneous Before meals and at bedtime  levETIRAcetam 500 milliGRAM(s) Oral two times a day  levothyroxine 50 MICROGram(s) Oral daily  metoprolol tartrate 12.5 milliGRAM(s) Oral every 12 hours  niMODipine Oral Solution 30 milliGRAM(s) Oral every 2 hours  polyethylene glycol 3350 17 Gram(s) Oral daily  senna 2 Tablet(s) Oral at bedtime    MEDICATIONS  (PRN):        IMAGING:      EXAMINATION:  PHYSICAL EXAM:    Constitutional: No Acute Distress     Neurological: Awake, alert oriented to person, place and time, Following Commands, PERRL, EOMI, No Gaze Preference, Face Symmetrical, Speech Fluent, No dysmetria, No ataxia, No nystagmus     Motor exam:          Upper extremity                         Delt     Bicep     Tricep    HG                                                 R         5/5 5/5 5/5       5/5                                               L          5/5 5/5 5/5 5/5          Lower extremity                        HF         KF        KE       DF         PF                                                  R        5/5 5/5 5/5 5/5         5/5                                               L         5/5 5/5       5/5       5/5          5/5                                                 Sensation: [x] intact to light touch  [ ] decreased:     Pulmonary: Clear to Auscultation, No rales, No rhonchi, No wheezes     Cardiovascular: S1, S2, Regular rate and rhythm     Gastrointestinal: Soft, Non-tender, Non-distended     Extremities: No calf tenderness     Incision:    HPI:  72 yo woman with PMH of HTN, HLD, hypothyroidism, had severe sudden headache on Saturday while [employer]. Her son took her to to [facility] yesterday and was found to have left IPH and SAH. MRI brain no underlying mass, CTA head ? 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 patient transferred to [facility] for angio     24 hour Events:  1/14- No events. Patient's examination stable    1/15 no events    Allergies    sulfa drugs (Hives)    Intolerances    opioid-like analgesics (Vomiting)      REVIEW OF SYSTEMS: [ ] Unable to Assess due to neurologic exam   [x] All ROS addressed below are non-contributory, except:  Neuro: [x] Headache [ ] Back pain [ ] Numbness [ ] Weakness [ ] Ataxia [ ] Dizziness [ ] Aphasia [ ] Dysarthria [ ] Visual disturbance  Resp: [ ] Shortness of breath/dyspnea, [ ] Orthopnea [ ] Cough  CV: [ ] Chest pain [ ] Palpitation [ ] Lightheadedness [ ] Syncope  Renal: [ ] Thirst [ ] Edema  GI: [ ] Nausea [ ] Emesis [ ] Abdominal pain [ ] Constipation [ ] Diarrhea  Hem: [ ] Hematemesis [ ] bright red blood per rectum  ID: [ ] Fever [ ] Chills [ ] Dysuria  ENT: [ ] Rhinorrhea      DEVICES:   [ ] Restraints [ ] ET tube [ ] central line [ ] arterial line [ ] hicks [ ] NGT/OGT [ ] EVD [ ] LD [ ] JAYSON/HMV [ ] Trach [ ] PEG [ ] Chest Tube     VITALS:   Vital Signs Last 24 Hrs  T(C): 36.9 (13 Jan 2024 23:00), Max: 36.9 (13 Jan 2024 03:00)  T(F): 98.5 (13 Jan 2024 23:00), Max: 98.5 (13 Jan 2024 03:00)  HR: 115 (14 Jan 2024 00:00) (53 - 115)  BP: 136/68 (14 Jan 2024 00:00) (94/82 - 156/70)  BP(mean): 86 (14 Jan 2024 00:00) (70 - 113)  RR: 17 (14 Jan 2024 00:00) (13 - 27)  SpO2: 95% (14 Jan 2024 00:00) (92% - 100%)    Parameters below as of 13 Jan 2024 12:39  Patient On (Oxygen Delivery Method): room air      CAPILLARY BLOOD GLUCOSE      POCT Blood Glucose.: 90 mg/dL (13 Jan 2024 22:16)  POCT Blood Glucose.: 91 mg/dL (13 Jan 2024 17:01)  POCT Blood Glucose.: 90 mg/dL (13 Jan 2024 11:58)  POCT Blood Glucose.: 93 mg/dL (13 Jan 2024 06:38)    I&O's Summary    12 Jan 2024 07:01  -  13 Jan 2024 07:00  --------------------------------------------------------  IN: 2207.3 mL / OUT: 1100 mL / NET: 1107.3 mL    13 Jan 2024 07:01  -  14 Jan 2024 00:49  --------------------------------------------------------  IN: 830 mL / OUT: 1100 mL / NET: -270 mL        Respiratory:        LABS:                        13.4   13.24 )-----------( 235      ( 12 Jan 2024 21:42 )             38.3     01-12    138  |  101  |  7   ----------------------------<  99  3.5   |  24  |  0.61             MEDICATION LEVELS:     IVF FLUIDS/MEDICATIONS:   MEDICATIONS  (STANDING):  atorvastatin 20 milliGRAM(s) Oral at bedtime  chlorhexidine 4% Liquid 1 Application(s) Topical daily  DULoxetine 60 milliGRAM(s) Oral daily  famotidine    Tablet 20 milliGRAM(s) Oral daily  insulin lispro (ADMELOG) corrective regimen sliding scale   SubCutaneous Before meals and at bedtime  levETIRAcetam 500 milliGRAM(s) Oral two times a day  levothyroxine 50 MICROGram(s) Oral daily  metoprolol tartrate 12.5 milliGRAM(s) Oral every 12 hours  niMODipine Oral Solution 30 milliGRAM(s) Oral every 2 hours  polyethylene glycol 3350 17 Gram(s) Oral daily  senna 2 Tablet(s) Oral at bedtime    MEDICATIONS  (PRN):        IMAGING:      EXAMINATION:  PHYSICAL EXAM:    Constitutional: No Acute Distress     Neurological: Awake, alert oriented to person, place and time, Following Commands, PERRL, EOMI, No Gaze Preference, Face Symmetrical, Speech Fluent, No dysmetria, No ataxia, No nystagmus     Motor exam:          Upper extremity                         Delt     Bicep     Tricep    HG                                                 R         5/5 5/5 5/5       5/5                                               L          5/5 5/5 5/5 5/5          Lower extremity                        HF         KF        KE       DF         PF                                                  R        5/5 5/5 5/5 5/5         5/5                                               L         5/5 5/5       5/5       5/5          5/5                                                 Sensation: [x] intact to light touch  [ ] decreased:     Pulmonary: Clear to Auscultation, No rales, No rhonchi, No wheezes     Cardiovascular: S1, S2, Regular rate and rhythm     Gastrointestinal: Soft, Non-tender, Non-distended     Extremities: No calf tenderness     Incision:    HPI:  74 yo woman with PMH of HTN, HLD, hypothyroidism, had severe sudden headache on Saturday while [employer]. Her son took her to to [facility] yesterday and was found to have left IPH and SAH. MRI brain no underlying mass, CTA head ? 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 patient transferred to [facility] for angio     24 hour Events:  1/14- No events. Patient's examination stable    1/15 no events    Allergies    sulfa drugs (Hives)    Intolerances    opioid-like analgesics (Vomiting)      REVIEW OF SYSTEMS: [ ] Unable to Assess due to neurologic exam   [x] All ROS addressed below are non-contributory, except:  Neuro: [x] Headache [ ] Back pain [ ] Numbness [ ] Weakness [ ] Ataxia [ ] Dizziness [ ] Aphasia [ ] Dysarthria [ ] Visual disturbance  Resp: [ ] Shortness of breath/dyspnea, [ ] Orthopnea [ ] Cough  CV: [ ] Chest pain [ ] Palpitation [ ] Lightheadedness [ ] Syncope  Renal: [ ] Thirst [ ] Edema  GI: [ ] Nausea [ ] Emesis [ ] Abdominal pain [ ] Constipation [ ] Diarrhea  Hem: [ ] Hematemesis [ ] bright red blood per rectum  ID: [ ] Fever [ ] Chills [ ] Dysuria  ENT: [ ] Rhinorrhea      DEVICES:   [ ] Restraints [ ] ET tube [ ] central line [ ] arterial line [ ] hicks [ ] NGT/OGT [ ] EVD [ ] LD [ ] JAYSON/HMV [ ] Trach [ ] PEG [ ] Chest Tube     VITALS:   Vital Signs Last 24 Hrs  T(C): 36.9 (13 Jan 2024 23:00), Max: 36.9 (13 Jan 2024 03:00)  T(F): 98.5 (13 Jan 2024 23:00), Max: 98.5 (13 Jan 2024 03:00)  HR: 115 (14 Jan 2024 00:00) (53 - 115)  BP: 136/68 (14 Jan 2024 00:00) (94/82 - 156/70)  BP(mean): 86 (14 Jan 2024 00:00) (70 - 113)  RR: 17 (14 Jan 2024 00:00) (13 - 27)  SpO2: 95% (14 Jan 2024 00:00) (92% - 100%)    Parameters below as of 13 Jan 2024 12:39  Patient On (Oxygen Delivery Method): room air      CAPILLARY BLOOD GLUCOSE      POCT Blood Glucose.: 90 mg/dL (13 Jan 2024 22:16)  POCT Blood Glucose.: 91 mg/dL (13 Jan 2024 17:01)  POCT Blood Glucose.: 90 mg/dL (13 Jan 2024 11:58)  POCT Blood Glucose.: 93 mg/dL (13 Jan 2024 06:38)    I&O's Summary    12 Jan 2024 07:01  -  13 Jan 2024 07:00  --------------------------------------------------------  IN: 2207.3 mL / OUT: 1100 mL / NET: 1107.3 mL    13 Jan 2024 07:01  -  14 Jan 2024 00:49  --------------------------------------------------------  IN: 830 mL / OUT: 1100 mL / NET: -270 mL        Respiratory:        LABS:                        13.4   13.24 )-----------( 235      ( 12 Jan 2024 21:42 )             38.3     01-12    138  |  101  |  7   ----------------------------<  99  3.5   |  24  |  0.61             MEDICATION LEVELS:     IVF FLUIDS/MEDICATIONS:   MEDICATIONS  (STANDING):  atorvastatin 20 milliGRAM(s) Oral at bedtime  chlorhexidine 4% Liquid 1 Application(s) Topical daily  DULoxetine 60 milliGRAM(s) Oral daily  famotidine    Tablet 20 milliGRAM(s) Oral daily  insulin lispro (ADMELOG) corrective regimen sliding scale   SubCutaneous Before meals and at bedtime  levETIRAcetam 500 milliGRAM(s) Oral two times a day  levothyroxine 50 MICROGram(s) Oral daily  metoprolol tartrate 12.5 milliGRAM(s) Oral every 12 hours  niMODipine Oral Solution 30 milliGRAM(s) Oral every 2 hours  polyethylene glycol 3350 17 Gram(s) Oral daily  senna 2 Tablet(s) Oral at bedtime    MEDICATIONS  (PRN):        IMAGING:      EXAMINATION:  PHYSICAL EXAM:    Constitutional: No Acute Distress     Neurological: Awake, alert oriented to person, place and time, Following Commands, PERRL, EOMI, No Gaze Preference, Face Symmetrical, Speech Fluent, No dysmetria, No ataxia, No nystagmus     Motor exam:          Upper extremity                         Delt     Bicep     Tricep    HG                                                 R         5/5 5/5 5/5       5/5                                               L          5/5 5/5 5/5 5/5          Lower extremity                        HF         KF        KE       DF         PF                                                  R        5/5 5/5 5/5 5/5         5/5                                               L         5/5 5/5       5/5       5/5          5/5                                                 Sensation: [x] intact to light touch  [ ] decreased:     Pulmonary: Clear to Auscultation, No rales, No rhonchi, No wheezes     Cardiovascular: S1, S2, Regular rate and rhythm     Gastrointestinal: Soft, Non-tender, Non-distended     Extremities: No calf tenderness     Incision:

## 2024-01-15 NOTE — PROGRESS NOTE ADULT - ASSESSMENT
A/P:  left frontal IPH and SAH, ICH 1, SAH mfs 1, HHS 1   CTA concern for AVF and aneurysm     Neuro: neuro checks q 2 hr   keppra 500 mg BID for seizure ppx x 7 days duration   CTA with some abnormal vasculature concerning for AVF  Angiogram on Tuesday   Continue home duloxetine   On nimodipine for DCI prophylaxis, goal normonatremia, normovolemia      Respiratory:  RA     CV: HTN on metoprolol 25 mg BID  Continue metoprolol 25 mg BID with holding parameters    SBP  mmhg   L Radial A line     Endocrine:  Hpothyroidism - continue Synthroid    GI: Regular diet  Continue home Pepcid   Bowel regimen     Heme/Onc:     H/H stable        DVT ppx: Start as the patient has had 2 stable CTH - SQL   LED- negative     Renal: IVL  Monitor renal function, strict I/O  Maintain normonatremia, normovolemia    ID: afebrile  A/P:  left frontal IPH and SAH, ICH 1, SAH mfs 1, HHS 1   CTA concern for AVF and aneurysm     Neuro: neuro checks q 4 hr   keppra 500 mg BID for seizure ppx x 7 days duration   CTA with some abnormal vasculature concerning for AVF  Angiogram on Tuesday   Continue home duloxetine   On nimodipine for DCI prophylaxis, goal normonatremia, normovolemia      Respiratory:  RA     CV: HTN on metoprolol 25 mg BID  Continue metoprolol 25 mg BID with holding parameters    SBP  mmhg       Endocrine:  Hpothyroidism - continue Synthroid    GI: Regular diet  Continue home Pepcid   Bowel regimen     Heme/Onc:     H/H stable        DVT ppx: Start as the patient has had 2 stable CTH - SQL   LED- negative     Renal: IVL  Monitor renal function, strict I/O  Maintain normonatremia, normovolemia    ID: afebrile     L axillary a line

## 2024-01-16 ENCOUNTER — APPOINTMENT (OUTPATIENT)
Dept: NEUROSURGERY | Facility: HOSPITAL | Age: 74
End: 2024-01-16

## 2024-01-16 LAB
ANION GAP SERPL CALC-SCNC: 11 MMOL/L — SIGNIFICANT CHANGE UP (ref 5–17)
ANION GAP SERPL CALC-SCNC: 12 MMOL/L — SIGNIFICANT CHANGE UP (ref 5–17)
APTT BLD: 32.3 SEC — SIGNIFICANT CHANGE UP (ref 24.5–35.6)
BLD GP AB SCN SERPL QL: NEGATIVE — SIGNIFICANT CHANGE UP
BUN SERPL-MCNC: 14 MG/DL — SIGNIFICANT CHANGE UP (ref 7–23)
BUN SERPL-MCNC: 9 MG/DL — SIGNIFICANT CHANGE UP (ref 7–23)
CALCIUM SERPL-MCNC: 9.4 MG/DL — SIGNIFICANT CHANGE UP (ref 8.4–10.5)
CALCIUM SERPL-MCNC: 9.6 MG/DL — SIGNIFICANT CHANGE UP (ref 8.4–10.5)
CHLORIDE SERPL-SCNC: 102 MMOL/L — SIGNIFICANT CHANGE UP (ref 96–108)
CHLORIDE SERPL-SCNC: 105 MMOL/L — SIGNIFICANT CHANGE UP (ref 96–108)
CO2 SERPL-SCNC: 22 MMOL/L — SIGNIFICANT CHANGE UP (ref 22–31)
CO2 SERPL-SCNC: 22 MMOL/L — SIGNIFICANT CHANGE UP (ref 22–31)
CREAT SERPL-MCNC: 0.73 MG/DL — SIGNIFICANT CHANGE UP (ref 0.5–1.3)
CREAT SERPL-MCNC: 0.81 MG/DL — SIGNIFICANT CHANGE UP (ref 0.5–1.3)
EGFR: 77 ML/MIN/1.73M2 — SIGNIFICANT CHANGE UP
EGFR: 87 ML/MIN/1.73M2 — SIGNIFICANT CHANGE UP
GLUCOSE SERPL-MCNC: 104 MG/DL — HIGH (ref 70–99)
GLUCOSE SERPL-MCNC: 117 MG/DL — HIGH (ref 70–99)
HCT VFR BLD CALC: 36.7 % — SIGNIFICANT CHANGE UP (ref 34.5–45)
HCT VFR BLD CALC: 38.1 % — SIGNIFICANT CHANGE UP (ref 34.5–45)
HGB BLD-MCNC: 12.7 G/DL — SIGNIFICANT CHANGE UP (ref 11.5–15.5)
HGB BLD-MCNC: 12.7 G/DL — SIGNIFICANT CHANGE UP (ref 11.5–15.5)
INR BLD: 0.98 RATIO — SIGNIFICANT CHANGE UP (ref 0.85–1.18)
MAGNESIUM SERPL-MCNC: 1.6 MG/DL — SIGNIFICANT CHANGE UP (ref 1.6–2.6)
MAGNESIUM SERPL-MCNC: 1.9 MG/DL — SIGNIFICANT CHANGE UP (ref 1.6–2.6)
MCHC RBC-ENTMCNC: 31.5 PG — SIGNIFICANT CHANGE UP (ref 27–34)
MCHC RBC-ENTMCNC: 32.2 PG — SIGNIFICANT CHANGE UP (ref 27–34)
MCHC RBC-ENTMCNC: 33.3 GM/DL — SIGNIFICANT CHANGE UP (ref 32–36)
MCHC RBC-ENTMCNC: 34.6 GM/DL — SIGNIFICANT CHANGE UP (ref 32–36)
MCV RBC AUTO: 93.1 FL — SIGNIFICANT CHANGE UP (ref 80–100)
MCV RBC AUTO: 94.5 FL — SIGNIFICANT CHANGE UP (ref 80–100)
NRBC # BLD: 0 /100 WBCS — SIGNIFICANT CHANGE UP (ref 0–0)
NRBC # BLD: 0 /100 WBCS — SIGNIFICANT CHANGE UP (ref 0–0)
PHOSPHATE SERPL-MCNC: 3.4 MG/DL — SIGNIFICANT CHANGE UP (ref 2.5–4.5)
PHOSPHATE SERPL-MCNC: 3.8 MG/DL — SIGNIFICANT CHANGE UP (ref 2.5–4.5)
PLATELET # BLD AUTO: 242 K/UL — SIGNIFICANT CHANGE UP (ref 150–400)
PLATELET # BLD AUTO: 275 K/UL — SIGNIFICANT CHANGE UP (ref 150–400)
POTASSIUM SERPL-MCNC: 3.6 MMOL/L — SIGNIFICANT CHANGE UP (ref 3.5–5.3)
POTASSIUM SERPL-MCNC: 3.8 MMOL/L — SIGNIFICANT CHANGE UP (ref 3.5–5.3)
POTASSIUM SERPL-SCNC: 3.6 MMOL/L — SIGNIFICANT CHANGE UP (ref 3.5–5.3)
POTASSIUM SERPL-SCNC: 3.8 MMOL/L — SIGNIFICANT CHANGE UP (ref 3.5–5.3)
PROTHROM AB SERPL-ACNC: 10.8 SEC — SIGNIFICANT CHANGE UP (ref 9.5–13)
RBC # BLD: 3.94 M/UL — SIGNIFICANT CHANGE UP (ref 3.8–5.2)
RBC # BLD: 4.03 M/UL — SIGNIFICANT CHANGE UP (ref 3.8–5.2)
RBC # FLD: 12 % — SIGNIFICANT CHANGE UP (ref 10.3–14.5)
RBC # FLD: 12.1 % — SIGNIFICANT CHANGE UP (ref 10.3–14.5)
RH IG SCN BLD-IMP: NEGATIVE — SIGNIFICANT CHANGE UP
SODIUM SERPL-SCNC: 136 MMOL/L — SIGNIFICANT CHANGE UP (ref 135–145)
SODIUM SERPL-SCNC: 138 MMOL/L — SIGNIFICANT CHANGE UP (ref 135–145)
WBC # BLD: 11.25 K/UL — HIGH (ref 3.8–10.5)
WBC # BLD: 15.1 K/UL — HIGH (ref 3.8–10.5)
WBC # FLD AUTO: 11.25 K/UL — HIGH (ref 3.8–10.5)
WBC # FLD AUTO: 15.1 K/UL — HIGH (ref 3.8–10.5)

## 2024-01-16 PROCEDURE — 36226 PLACE CATH VERTEBRAL ART: CPT | Mod: LT

## 2024-01-16 PROCEDURE — 99232 SBSQ HOSP IP/OBS MODERATE 35: CPT

## 2024-01-16 PROCEDURE — 36227 PLACE CATH XTRNL CAROTID: CPT | Mod: 50

## 2024-01-16 PROCEDURE — 36224 PLACE CATH CAROTD ART: CPT | Mod: 50

## 2024-01-16 PROCEDURE — 76377 3D RENDER W/INTRP POSTPROCES: CPT | Mod: 26

## 2024-01-16 RX ORDER — MAGNESIUM SULFATE 500 MG/ML
1 VIAL (ML) INJECTION ONCE
Refills: 0 | Status: COMPLETED | OUTPATIENT
Start: 2024-01-16 | End: 2024-01-16

## 2024-01-16 RX ORDER — MAGNESIUM SULFATE 500 MG/ML
1 VIAL (ML) INJECTION ONCE
Refills: 0 | Status: COMPLETED | OUTPATIENT
Start: 2024-01-16 | End: 2024-01-17

## 2024-01-16 RX ORDER — POTASSIUM CHLORIDE 20 MEQ
20 PACKET (EA) ORAL ONCE
Refills: 0 | Status: DISCONTINUED | OUTPATIENT
Start: 2024-01-16 | End: 2024-01-16

## 2024-01-16 RX ORDER — POTASSIUM CHLORIDE 20 MEQ
40 PACKET (EA) ORAL ONCE
Refills: 0 | Status: COMPLETED | OUTPATIENT
Start: 2024-01-16 | End: 2024-01-17

## 2024-01-16 RX ORDER — FAMOTIDINE 10 MG/ML
20 INJECTION INTRAVENOUS
Refills: 0 | Status: DISCONTINUED | OUTPATIENT
Start: 2024-01-16 | End: 2024-01-17

## 2024-01-16 RX ORDER — POTASSIUM CHLORIDE 20 MEQ
10 PACKET (EA) ORAL
Refills: 0 | Status: COMPLETED | OUTPATIENT
Start: 2024-01-16 | End: 2024-01-16

## 2024-01-16 RX ADMIN — NIMODIPINE 30 MILLIGRAM(S): 60 SOLUTION ORAL at 13:08

## 2024-01-16 RX ADMIN — LEVETIRACETAM 500 MILLIGRAM(S): 250 TABLET, FILM COATED ORAL at 05:23

## 2024-01-16 RX ADMIN — NIMODIPINE 30 MILLIGRAM(S): 60 SOLUTION ORAL at 04:08

## 2024-01-16 RX ADMIN — Medication 12.5 MILLIGRAM(S): at 19:30

## 2024-01-16 RX ADMIN — NIMODIPINE 30 MILLIGRAM(S): 60 SOLUTION ORAL at 07:45

## 2024-01-16 RX ADMIN — FAMOTIDINE 20 MILLIGRAM(S): 10 INJECTION INTRAVENOUS at 07:44

## 2024-01-16 RX ADMIN — LEVETIRACETAM 500 MILLIGRAM(S): 250 TABLET, FILM COATED ORAL at 19:30

## 2024-01-16 RX ADMIN — Medication 100 MILLIEQUIVALENT(S): at 02:31

## 2024-01-16 RX ADMIN — Medication 50 MICROGRAM(S): at 05:23

## 2024-01-16 RX ADMIN — CHLORHEXIDINE GLUCONATE 1 APPLICATION(S): 213 SOLUTION TOPICAL at 22:30

## 2024-01-16 RX ADMIN — NIMODIPINE 30 MILLIGRAM(S): 60 SOLUTION ORAL at 20:10

## 2024-01-16 RX ADMIN — NIMODIPINE 30 MILLIGRAM(S): 60 SOLUTION ORAL at 06:08

## 2024-01-16 RX ADMIN — DULOXETINE HYDROCHLORIDE 60 MILLIGRAM(S): 30 CAPSULE, DELAYED RELEASE ORAL at 13:07

## 2024-01-16 RX ADMIN — NIMODIPINE 30 MILLIGRAM(S): 60 SOLUTION ORAL at 14:48

## 2024-01-16 RX ADMIN — ATORVASTATIN CALCIUM 20 MILLIGRAM(S): 80 TABLET, FILM COATED ORAL at 22:13

## 2024-01-16 RX ADMIN — NIMODIPINE 30 MILLIGRAM(S): 60 SOLUTION ORAL at 10:28

## 2024-01-16 RX ADMIN — Medication 100 MILLIEQUIVALENT(S): at 04:08

## 2024-01-16 RX ADMIN — NIMODIPINE 30 MILLIGRAM(S): 60 SOLUTION ORAL at 22:13

## 2024-01-16 RX ADMIN — Medication 12.5 MILLIGRAM(S): at 05:23

## 2024-01-16 NOTE — PROGRESS NOTE ADULT - ASSESSMENT
74 yo woman with PMH of HTN, HLD, hypothyroidism, had severe sudden headache on Saturday while [employer]. Her son took her to to [facility] yesterday and was found to have left IPH and SAH. MRI brain no underlying mass, CTA head ? 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 patient transferred to [facility] for angio     POD 0: diagnostic angiogram w/ non-arterial spot sign      Neuro:   - neuro checks q 4 hr   - keppra 500 mg BID for seizure ppx x 7 days duration   - CTA with some abnormal vasculature concerning for AVF  - angio with non-arterial spot sign, will repeat on 1/23  - Continue home duloxetine   - On nimodipine for DCI prophylaxis, goal normonatremia, normovolemia      Respiratory:    - RA   - IS    CV:  - SBP   - Continue metoprolol 25 mg BID with holding parameters      Endocrine:  - Hpothyroidism - continue Synthroid    GI:   - Regular diet  - Continue home Pepcid   - Bowel regimen     Heme/Onc:     - H/H stable        - DVT ppx: Start as the patient has had 2 stable CTH - SQL   - LED- negative     Renal:   - IVL  - Monitor renal function, strict I/O  - Maintain normonatremia, normovolemia    ID:  - afebrile     L axillary a line

## 2024-01-16 NOTE — PROGRESS NOTE ADULT - SUBJECTIVE AND OBJECTIVE BOX
HPI:  72 yo woman with PMH of HTN, HLD, hypothyroidism, had severe sudden headache on Saturday while [employer]. Her son took her to to [facility] yesterday and was found to have left IPH and SAH. MRI brain no underlying mass, CTA head ? 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 patient transferred to [facility] for angio     24 hour Events:  1/14- No events. Patient's examination stable    1/15 no events  1/16 s/p diagnostic angio    Allergies    sulfa drugs (Hives)    Intolerances    opioid-like analgesics (Vomiting)      REVIEW OF SYSTEMS: [ ] Unable to Assess due to neurologic exam   [x] All ROS addressed below are non-contributory, except:  Neuro: [x] Headache [ ] Back pain [ ] Numbness [ ] Weakness [ ] Ataxia [ ] Dizziness [ ] Aphasia [ ] Dysarthria [ ] Visual disturbance  Resp: [ ] Shortness of breath/dyspnea, [ ] Orthopnea [ ] Cough  CV: [ ] Chest pain [ ] Palpitation [ ] Lightheadedness [ ] Syncope  Renal: [ ] Thirst [ ] Edema  GI: [ ] Nausea [ ] Emesis [ ] Abdominal pain [ ] Constipation [ ] Diarrhea  Hem: [ ] Hematemesis [ ] bright red blood per rectum  ID: [ ] Fever [ ] Chills [ ] Dysuria  ENT: [ ] Rhinorrhea    ICU Vital Signs Last 24 Hrs  T(C): 37.1 (16 Jan 2024 23:00), Max: 37.1 (16 Jan 2024 23:00)  T(F): 98.7 (16 Jan 2024 23:00), Max: 98.7 (16 Jan 2024 23:00)  HR: 69 (16 Jan 2024 23:00) (64 - 101)  BP: 104/59 (16 Jan 2024 17:43) (104/59 - 104/59)  BP(mean): 73 (16 Jan 2024 17:43) (73 - 73)  ABP: 111/70 (16 Jan 2024 23:00) (107/48 - 167/90)  ABP(mean): 255 (16 Jan 2024 23:00) (70 - 255)  RR: 20 (16 Jan 2024 23:00) (14 - 25)  SpO2: 93% (16 Jan 2024 23:00) (93% - 100%)    O2 Parameters below as of 16 Jan 2024 19:00  Patient On (Oxygen Delivery Method): room air                          12.7   15.10 )-----------( 275      ( 16 Jan 2024 23:15 )             36.7   01-16    136  |  102  |  9   ----------------------------<  117<H>  3.6   |  22  |  0.73    Ca    9.6      16 Jan 2024 23:15  Phos  3.4     01-16  Mg     1.6     01-16    MEDICATIONS  (STANDING):  atorvastatin 20 milliGRAM(s) Oral at bedtime  chlorhexidine 4% Liquid 1 Application(s) Topical daily  DULoxetine 60 milliGRAM(s) Oral daily  famotidine    Tablet 20 milliGRAM(s) Oral <User Schedule>  levETIRAcetam 500 milliGRAM(s) Oral two times a day  levothyroxine 50 MICROGram(s) Oral daily  metoprolol tartrate 12.5 milliGRAM(s) Oral every 12 hours  niMODipine Oral Solution 30 milliGRAM(s) Oral every 2 hours  sodium chloride 0.9%. 1000 milliLiter(s) (75 mL/Hr) IV Continuous <Continuous>    MEDICATIONS  (PRN):  acetaminophen     Tablet .. 650 milliGRAM(s) Oral every 6 hours PRN Moderate Pain (4 - 6)  aluminum hydroxide/magnesium hydroxide/simethicone Suspension 30 milliLiter(s) Oral every 4 hours PRN Dyspepsia  loperamide 4 milliGRAM(s) Oral daily PRN Diarrhea        PHYSICAL EXAM:    Constitutional: No Acute Distress     Neurological: Awake, alert oriented to person, place and time, Following Commands, PERRL, EOMI, No Gaze Preference, Face Symmetrical, Speech Fluent, No dysmetria, No ataxia, No nystagmus     Sensation: [x] intact to light touch  [ ] decreased:     Pulmonary: Clear to Auscultation, No rales, No rhonchi, No wheezes     Cardiovascular: S1, S2, Regular rate and rhythm     Gastrointestinal: Soft, Non-tender, Non-distended     Extremities: No calf tenderness     Incision: c/d/i

## 2024-01-16 NOTE — CHART NOTE - NSCHARTNOTEFT_GEN_A_CORE
Interventional Neuro Radiology  Pre-Procedure Note NP      HPI:  72 yo woman with PMH of HTN, HLD, hypothyroidism, had severe sudden headache on Saturday while [employer]. Her son took her to to [facility] yesterday and was found to have left IPH and SAH. MRI brain no underlying mass, CTA head ? 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 patient transferred to [facility] for angio  (12 Jan 2024 06:48). Patient presents today for cerebral angiogram possible embolization.       Allergies: sulfa drugs (Hives)  opioid-like analgesics (Vomiting)      Current Medications:   acetaminophen     Tablet .. 650 milliGRAM(s) Oral every 6 hours PRN  aluminum hydroxide/magnesium hydroxide/simethicone Suspension 30 milliLiter(s) Oral every 4 hours PRN  atorvastatin 20 milliGRAM(s) Oral at bedtime  chlorhexidine 4% Liquid 1 Application(s) Topical daily  DULoxetine 60 milliGRAM(s) Oral daily  famotidine    Tablet 20 milliGRAM(s) Oral <User Schedule>  levETIRAcetam 500 milliGRAM(s) Oral two times a day  levothyroxine 50 MICROGram(s) Oral daily  loperamide 4 milliGRAM(s) Oral daily PRN  metoprolol tartrate 12.5 milliGRAM(s) Oral every 12 hours  niMODipine Oral Solution 30 milliGRAM(s) Oral every 2 hours  sodium chloride 0.9%. 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   Patient presents to neuro-IR for selective cerebral angiography possible embolization.   Procedure, goals, risks, benefits and alternatives  were discussed with patient and patient's family.  All questions were answered.  Risks include but are not limited to stroke, vessel injury, hemorrhage, and or right  groin hematoma.  Patient demonstrates understanding  of all risks involved with this procedure and wishes to continue.   Appropriate  consent was obtained from patient and consent is in the patient's chart.

## 2024-01-16 NOTE — PROGRESS NOTE ADULT - NS ATTEND AMEND GEN_ALL_CORE FT
72 yo woman with h/o HTN, HLD, hypothyroidism, had severe sudden headache on 1/6 with worsening on 1/11. 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 Angiogram today without evidence of vascular malformation.     Patient reports feeling well.    On exam, alert, oriented to self, hospital and year, speech is fluent, briskly follows commands, PERRL, EOMI, VFF, face symmetric, no PD, no LE drift   R groin without hematoma.     q4h NC   plan for repeat angio in 1 week   c/w Keppra ppx  SBP <140  regular diet     Patient seen and examined by attending on 1/16/2023.    Patient is not critically ill but is medically complex due to cortical ICH and SAH.

## 2024-01-16 NOTE — PRE-ANESTHESIA EVALUATION ADULT - NSANTHPMHFT_GEN_ALL_CORE
HTN, HLD, Hypothyroidism, Hiatal hernia   Presented with HA 2/2 IPH and SAH  h/o 8 hour cyst surgery c/b postoperative re-intubation; later extubated 48 hours later; expresses that she is sensitive to anesthesia

## 2024-01-16 NOTE — PROGRESS NOTE ADULT - ASSESSMENT
A/P:  left frontal IPH and SAH, ICH 1, SAH mfs 1, HHS 1   CTA concern for AVF and aneurysm     Neuro: neuro checks q 4 hr   keppra 500 mg BID for seizure ppx x 7 days duration   CTA with some abnormal vasculature concerning for AVF  Angiogram on Tuesday   Continue home duloxetine   On nimodipine for DCI prophylaxis, goal normonatremia, normovolemia      Respiratory:  RA     CV: HTN on metoprolol 25 mg BID  Continue metoprolol 25 mg BID with holding parameters    SBP  mmhg       Endocrine:  Hpothyroidism - continue Synthroid    GI: Regular diet  Continue home Pepcid   Bowel regimen     Heme/Onc:     H/H stable        DVT ppx: Start as the patient has had 2 stable CTH - SQL   LED- negative     Renal: IVL  Monitor renal function, strict I/O  Maintain normonatremia, normovolemia    ID: afebrile     L axillary a line

## 2024-01-16 NOTE — CHART NOTE - NSCHARTNOTEFT_GEN_A_CORE
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 Alan Haney MD  Fellow: Dr. Janae Castaneda  Fellow: Dr. Vergara   Resident: Dr. Leia Bermudez    Anesthesia: (MAC) Dr. Campuzano    Sheath:    I/Os:  Estimated blood loss less than 10cc  IV fluids:     cc     Urine output     cc        Contrast Omnipaque 240      cc         Antibiotics:    Vitals: BP         HR      Spo2    %          Preliminary Report:    Using a 5 Fr short/long sheath to the right groin under MAC/general anesthesia via   left vertebral artery,  left internal carotid artery, left external carotid artery, right vertebral arter,  right internal carotid artery, right external carotid artery  a selective cerebral angiography was performed and  demonstrates ________. ( Official note to follow).  Patient tolerated procedure well, hemodynamically stable, no change in neurological status compared to baseline.  Results discussed with neurosurgery, patient and patient's  family.  Groin sheath was removed,  manual compression held to hemostasis  for  21 minutes, no active bleeding, no hematoma, vascade device applied,  quick clot and safeguard balloon dressing applied at _____h.  Patient transfered to IR Recovery Room/NSCU/PACU Interventional Neuro- Radiology   Procedure Note NP    Procedure: Selective Cerebral Angiography   Pre- Procedure Diagnosis: possible dural avf  Post- Procedure Diagnosis: spot sign    : Dr. Alan Haney MD  Fellow: Dr. Janae Castaneda  Fellow: Dr. Vergara   Resident: Dr. Leia Bermudez    Anesthesia: (MAC) Dr. Campuzano    Sheath: 5 Bulgarian short    I/Os:  Estimated blood loss less than 10cc  IV fluids: 100    cc     Urine output  due to void   cc        Contrast 82    cc                   Preliminary Report:  Using a 5 Fr short sheath to the right groin under MAC via left vertebral artery,  left internal carotid artery, left external carotid artery, right vertebral artery,  right internal carotid artery, right external carotid artery  a selective cerebral angiography was performed and  demonstrates spot sign. ( Official note to follow).  Patient tolerated procedure well, hemodynamically stable, no change in neurological status compared to pre op.  Results discussed with neurosurgery, patient and patient's  family.  Groin sheath was removed,  manual compression held to hemostasis  for  21 minutes, no active bleeding, no hematoma, vascade device applied,  quick clot and safeguard balloon dressing applied at 1815h.  Patient transferred back to NSCU

## 2024-01-17 PROCEDURE — 99231 SBSQ HOSP IP/OBS SF/LOW 25: CPT

## 2024-01-17 RX ORDER — PANTOPRAZOLE SODIUM 20 MG/1
40 TABLET, DELAYED RELEASE ORAL
Refills: 0 | Status: DISCONTINUED | OUTPATIENT
Start: 2024-01-17 | End: 2024-01-17

## 2024-01-17 RX ORDER — ONDANSETRON 8 MG/1
4 TABLET, FILM COATED ORAL ONCE
Refills: 0 | Status: COMPLETED | OUTPATIENT
Start: 2024-01-17 | End: 2024-01-17

## 2024-01-17 RX ORDER — LIDOCAINE 4 G/100G
1 CREAM TOPICAL ONCE
Refills: 0 | Status: COMPLETED | OUTPATIENT
Start: 2024-01-17 | End: 2024-01-17

## 2024-01-17 RX ORDER — SODIUM CHLORIDE 9 MG/ML
500 INJECTION INTRAMUSCULAR; INTRAVENOUS; SUBCUTANEOUS ONCE
Refills: 0 | Status: COMPLETED | OUTPATIENT
Start: 2024-01-17 | End: 2024-01-17

## 2024-01-17 RX ORDER — OMEPRAZOLE 10 MG/1
20 CAPSULE, DELAYED RELEASE ORAL DAILY
Refills: 0 | Status: DISCONTINUED | OUTPATIENT
Start: 2024-01-17 | End: 2024-01-24

## 2024-01-17 RX ORDER — ACETAMINOPHEN 500 MG
1000 TABLET ORAL ONCE
Refills: 0 | Status: COMPLETED | OUTPATIENT
Start: 2024-01-17 | End: 2024-01-17

## 2024-01-17 RX ORDER — SODIUM CHLORIDE 9 MG/ML
500 INJECTION INTRAMUSCULAR; INTRAVENOUS; SUBCUTANEOUS ONCE
Refills: 0 | Status: DISCONTINUED | OUTPATIENT
Start: 2024-01-17 | End: 2024-01-17

## 2024-01-17 RX ORDER — LEVOTHYROXINE SODIUM 125 MCG
50 TABLET ORAL DAILY
Refills: 0 | Status: DISCONTINUED | OUTPATIENT
Start: 2024-01-17 | End: 2024-01-24

## 2024-01-17 RX ORDER — NIMODIPINE 60 MG/10ML
60 SOLUTION ORAL EVERY 4 HOURS
Refills: 0 | Status: DISCONTINUED | OUTPATIENT
Start: 2024-01-17 | End: 2024-01-22

## 2024-01-17 RX ORDER — ENOXAPARIN SODIUM 100 MG/ML
40 INJECTION SUBCUTANEOUS
Refills: 0 | Status: DISCONTINUED | OUTPATIENT
Start: 2024-01-17 | End: 2024-01-24

## 2024-01-17 RX ADMIN — Medication 400 MILLIGRAM(S): at 08:20

## 2024-01-17 RX ADMIN — Medication 50 MICROGRAM(S): at 14:16

## 2024-01-17 RX ADMIN — NIMODIPINE 30 MILLIGRAM(S): 60 SOLUTION ORAL at 08:21

## 2024-01-17 RX ADMIN — Medication 4 MILLIGRAM(S): at 06:26

## 2024-01-17 RX ADMIN — SODIUM CHLORIDE 1000 MILLILITER(S): 9 INJECTION INTRAMUSCULAR; INTRAVENOUS; SUBCUTANEOUS at 06:45

## 2024-01-17 RX ADMIN — ONDANSETRON 4 MILLIGRAM(S): 8 TABLET, FILM COATED ORAL at 06:46

## 2024-01-17 RX ADMIN — Medication 400 MILLIGRAM(S): at 22:39

## 2024-01-17 RX ADMIN — Medication 1000 MILLIGRAM(S): at 08:50

## 2024-01-17 RX ADMIN — Medication 40 MILLIEQUIVALENT(S): at 00:38

## 2024-01-17 RX ADMIN — NIMODIPINE 30 MILLIGRAM(S): 60 SOLUTION ORAL at 02:18

## 2024-01-17 RX ADMIN — ENOXAPARIN SODIUM 40 MILLIGRAM(S): 100 INJECTION SUBCUTANEOUS at 18:25

## 2024-01-17 RX ADMIN — Medication 100 GRAM(S): at 00:38

## 2024-01-17 RX ADMIN — LEVETIRACETAM 500 MILLIGRAM(S): 250 TABLET, FILM COATED ORAL at 09:58

## 2024-01-17 RX ADMIN — DULOXETINE HYDROCHLORIDE 60 MILLIGRAM(S): 30 CAPSULE, DELAYED RELEASE ORAL at 11:58

## 2024-01-17 RX ADMIN — LIDOCAINE 1 PATCH: 4 CREAM TOPICAL at 19:48

## 2024-01-17 RX ADMIN — OMEPRAZOLE 20 MILLIGRAM(S): 10 CAPSULE, DELAYED RELEASE ORAL at 08:20

## 2024-01-17 RX ADMIN — NIMODIPINE 30 MILLIGRAM(S): 60 SOLUTION ORAL at 10:24

## 2024-01-17 RX ADMIN — ATORVASTATIN CALCIUM 20 MILLIGRAM(S): 80 TABLET, FILM COATED ORAL at 22:37

## 2024-01-17 RX ADMIN — Medication 650 MILLIGRAM(S): at 16:00

## 2024-01-17 RX ADMIN — LIDOCAINE 1 PATCH: 4 CREAM TOPICAL at 13:15

## 2024-01-17 RX ADMIN — Medication 650 MILLIGRAM(S): at 16:40

## 2024-01-17 RX ADMIN — NIMODIPINE 60 MILLIGRAM(S): 60 SOLUTION ORAL at 22:37

## 2024-01-17 RX ADMIN — SODIUM CHLORIDE 1000 MILLILITER(S): 9 INJECTION INTRAMUSCULAR; INTRAVENOUS; SUBCUTANEOUS at 00:34

## 2024-01-17 NOTE — PROVIDER CONTACT NOTE (MEDICATION) - BACKGROUND
SAH
s/p IPH SAH
Pt admitted for acute SAH and frontal lobe hematoma after fall at home
Pt on nimodipine q2h for management of SAH and potential vasospasm event

## 2024-01-17 NOTE — PROVIDER CONTACT NOTE (MEDICATION) - SITUATION
1200 and 1400 nimodipine held per hold order
Held 18:00 doses of nimodipine and metoprolol
held 1200 nimodipine
Pt admitted 1/12 for frontal lobe hematoma and acute SAH

## 2024-01-17 NOTE — PROGRESS NOTE ADULT - ATTENDING COMMENTS
angiogram 1/16 non arterial spot sign no AVM/fistula, repeat angio per neuroIR  step down to floor   30min spent reviewing data, management with multidisciplinary team

## 2024-01-17 NOTE — PROGRESS NOTE ADULT - ASSESSMENT
Juliane Negrete  73F Hx HTN, HLD, hypothyroid, presenting as xfer 1/12 [facility] s/p WHOL Saturday 1/6. CTH at [facility] r/f IPH/SAH. MRI brain (-) for any underlying mass. CTA showed subtle prominent vessel in Lt IPH c/f possible AVF w/ punctate aneurysm in IPH-territory. Coags wnl. Exam: intact    s/p 1/16/24 Angio- nonarterial spot sign, no AVM/fistula    TCDs daily  D/c floor in AM

## 2024-01-17 NOTE — PROVIDER CONTACT NOTE (MEDICATION) - ASSESSMENT
30mg oral solution nimodipine due at 10am, pt systolic blood pressure 109 mmHg
Noninvasive pressure cuff reads 99/80 with MAP of 87 at 18:00
held per parameters
no change from baseline

## 2024-01-17 NOTE — PROGRESS NOTE ADULT - ASSESSMENT
s/p 1/16/24 Angio- nonarterial spot sign, no AVM/fistula    DC keppra  rangio in a week  continue metop  normotensive  RA  PPI home meds  reactive hyperleukocytosis  Transfer floor    non critical

## 2024-01-17 NOTE — PROVIDER CONTACT NOTE (MEDICATION) - ACTION/TREATMENT ORDERED:
provider acknowledged
PA aware
Nimodipine 10am dose held
Ordered dosages of nimodipine and metoprolol held as per ordered parameters for low systolic blood pressure

## 2024-01-17 NOTE — PROGRESS NOTE ADULT - SUBJECTIVE AND OBJECTIVE BOX
Patient seen and examined at bedside.    --Anticoagulation--    T(C): 37.1 (01-16-24 @ 23:00), Max: 37.1 (01-16-24 @ 23:00)  HR: 70 (01-17-24 @ 00:00) (64 - 101)  BP: 104/59 (01-16-24 @ 17:43) (104/59 - 104/59)  RR: 18 (01-17-24 @ 00:00) (14 - 25)  SpO2: 93% (01-17-24 @ 00:00) (93% - 100%)  Wt(kg): --    Exam: intact, groin soft

## 2024-01-17 NOTE — PROGRESS NOTE ADULT - SUBJECTIVE AND OBJECTIVE BOX
HPI:  74 yo woman with PMH of HTN, HLD, hypothyroidism, had severe sudden headache on Saturday while [employer]. Her son took her to to [facility] yesterday and was found to have left IPH and SAH. MRI brain no underlying mass, CTA head ? 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 patient transferred to [facility] for angio     24 hour Events:  1/14- No events. Patient's examination stable    1/15 no events    Allergies    sulfa drugs (Hives)    Intolerances    opioid-like analgesics (Vomiting)      REVIEW OF SYSTEMS: [ ] Unable to Assess due to neurologic exam   [x] All ROS addressed below are non-contributory, except:  Neuro: [x] Headache [ ] Back pain [ ] Numbness [ ] Weakness [ ] Ataxia [ ] Dizziness [ ] Aphasia [ ] Dysarthria [ ] Visual disturbance  Resp: [ ] Shortness of breath/dyspnea, [ ] Orthopnea [ ] Cough  CV: [ ] Chest pain [ ] Palpitation [ ] Lightheadedness [ ] Syncope  Renal: [ ] Thirst [ ] Edema  GI: [ ] Nausea [ ] Emesis [ ] Abdominal pain [ ] Constipation [ ] Diarrhea  Hem: [ ] Hematemesis [ ] bright red blood per rectum  ID: [ ] Fever [ ] Chills [ ] Dysuria  ENT: [ ] Rhinorrhea    ICU Vital Signs Last 24 Hrs  T(C): 36.8 (16 Jan 2024 11:00), Max: 37.1 (15 Driss 2024 15:00)  T(F): 98.3 (16 Jan 2024 11:00), Max: 98.8 (15 Driss 2024 15:00)  HR: 72 (16 Jan 2024 11:00) (64 - 100)  BP: --  BP(mean): --  ABP: 132/59 (16 Jan 2024 11:00) (107/48 - 167/90)  ABP(mean): 87 (16 Jan 2024 11:00) (70 - 121)  RR: 15 (16 Jan 2024 11:00) (14 - 24)  SpO2: 100% (16 Jan 2024 11:00) (94% - 100%)    O2 Parameters below as of 16 Jan 2024 07:00  Patient On (Oxygen Delivery Method): room air    CBC:            12.7   11.25 )-----------( 242      ( 01-16-24 @ 00:27 )             38.1         Chem:         ( 01-16-24 @ 00:27 )    138  |  105  |  14  ----------------------------<  104<H>  3.8   |  22  |  0.81        Liver Functions:     Type & Screen: ( 01-16-24 @ 00:16 )    ABO/Rh/Amarilis:  O Negative       MEDICATIONS  (STANDING):  atorvastatin 20 milliGRAM(s) Oral at bedtime  chlorhexidine 4% Liquid 1 Application(s) Topical daily  DULoxetine 60 milliGRAM(s) Oral daily  famotidine    Tablet 20 milliGRAM(s) Oral <User Schedule>  levETIRAcetam 500 milliGRAM(s) Oral two times a day  levothyroxine 50 MICROGram(s) Oral daily  metoprolol tartrate 12.5 milliGRAM(s) Oral every 12 hours  niMODipine Oral Solution 30 milliGRAM(s) Oral every 2 hours  sodium chloride 0.9%. 1000 milliLiter(s) (75 mL/Hr) IV Continuous <Continuous>    MEDICATIONS  (PRN):  acetaminophen     Tablet .. 650 milliGRAM(s) Oral every 6 hours PRN Moderate Pain (4 - 6)  aluminum hydroxide/magnesium hydroxide/simethicone Suspension 30 milliLiter(s) Oral every 4 hours PRN Dyspepsia  loperamide 4 milliGRAM(s) Oral daily PRN Diarrhea      PHYSICAL EXAM:    Constitutional: No Acute Distress     Neurological: Awake, alert oriented to person, place and time, Following Commands, PERRL, EOMI, No Gaze Preference, Face Symmetrical, Speech Fluent, No dysmetria, No ataxia, No nystagmus     Motor exam:          Upper extremity                         Delt     Bicep     Tricep    HG                                                 R         5/5        5/5        5/5       5/5                                               L          5/5        5/5        5/5       5/5          Lower extremity                        HF         KF        KE       DF         PF                                                  R        5/5        5/5        5/5       5/5         5/5                                               L         5/5        5/5       5/5       5/5          5/5                                                 Sensation: [x] intact to light touch  [ ] decreased:     Pulmonary: Clear to Auscultation, No rales, No rhonchi, No wheezes     Cardiovascular: S1, S2, Regular rate and rhythm     Gastrointestinal: Soft, Non-tender, Non-distended     Extremities: No calf tenderness     Incision:    HPI:  72 yo woman with PMH of HTN, HLD, hypothyroidism, had severe sudden headache on Saturday while [employer]. Her son took her to to [facility] yesterday and was found to have left IPH and SAH. MRI brain no underlying mass, CTA head ? 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 Patient's examination stable    1/15 no events  01/17 no events    Allergies    sulfa drugs (Hives)    Intolerances    opioid-like analgesics (Vomiting)      REVIEW OF SYSTEMS: [ ] Unable to Assess due to neurologic exam   [x] All ROS addressed below are non-contributory, except:  Neuro: [x] Headache [ ] Back pain [ ] Numbness [ ] Weakness [ ] Ataxia [ ] Dizziness [ ] Aphasia [ ] Dysarthria [ ] Visual disturbance  Resp: [ ] Shortness of breath/dyspnea, [ ] Orthopnea [ ] Cough  CV: [ ] Chest pain [ ] Palpitation [ ] Lightheadedness [ ] Syncope  Renal: [ ] Thirst [ ] Edema  GI: [ ] Nausea [ ] Emesis [ ] Abdominal pain [ ] Constipation [ ] Diarrhea  Hem: [ ] Hematemesis [ ] bright red blood per rectum  ID: [ ] Fever [ ] Chills [ ] Dysuria  ENT: [ ] Rhinorrhea    ICU Vital Signs Last 24 Hrs  T(C): 36.8 (16 Jan 2024 11:00), Max: 37.1 (15 Driss 2024 15:00)  T(F): 98.3 (16 Jan 2024 11:00), Max: 98.8 (15 Driss 2024 15:00)  HR: 72 (16 Jan 2024 11:00) (64 - 100)  BP: --  BP(mean): --  ABP: 132/59 (16 Jan 2024 11:00) (107/48 - 167/90)  ABP(mean): 87 (16 Jan 2024 11:00) (70 - 121)  RR: 15 (16 Jan 2024 11:00) (14 - 24)  SpO2: 100% (16 Jan 2024 11:00) (94% - 100%)    O2 Parameters below as of 16 Jan 2024 07:00  Patient On (Oxygen Delivery Method): room air    CBC:            12.7   11.25 )-----------( 242      ( 01-16-24 @ 00:27 )             38.1         Chem:         ( 01-16-24 @ 00:27 )    138  |  105  |  14  ----------------------------<  104<H>  3.8   |  22  |  0.81        Liver Functions:     Type & Screen: ( 01-16-24 @ 00:16 )    ABO/Rh/Amarilis:  O Negative       MEDICATIONS  (STANDING):  atorvastatin 20 milliGRAM(s) Oral at bedtime  chlorhexidine 4% Liquid 1 Application(s) Topical daily  DULoxetine 60 milliGRAM(s) Oral daily  famotidine    Tablet 20 milliGRAM(s) Oral <User Schedule>  levETIRAcetam 500 milliGRAM(s) Oral two times a day  levothyroxine 50 MICROGram(s) Oral daily  metoprolol tartrate 12.5 milliGRAM(s) Oral every 12 hours  niMODipine Oral Solution 30 milliGRAM(s) Oral every 2 hours  sodium chloride 0.9%. 1000 milliLiter(s) (75 mL/Hr) IV Continuous <Continuous>    MEDICATIONS  (PRN):  acetaminophen     Tablet .. 650 milliGRAM(s) Oral every 6 hours PRN Moderate Pain (4 - 6)  aluminum hydroxide/magnesium hydroxide/simethicone Suspension 30 milliLiter(s) Oral every 4 hours PRN Dyspepsia  loperamide 4 milliGRAM(s) Oral daily PRN Diarrhea      PHYSICAL EXAM:    Constitutional: No Acute Distress     Neurological: Awake, alert oriented to person, place and time, Following Commands, PERRL, EOMI, No Gaze Preference, Face Symmetrical, Speech Fluent, No dysmetria, No ataxia, No nystagmus     Motor exam:          Upper extremity                         Delt     Bicep     Tricep    HG                                                 R         5/5        5/5        5/5       5/5                                               L          5/5        5/5        5/5       5/5          Lower extremity                        HF         KF        KE       DF         PF                                                  R        5/5        5/5        5/5       5/5         5/5                                               L         5/5        5/5       5/5       5/5          5/5                                                 Sensation: [x] intact to light touch  [ ] decreased:     Pulmonary: Clear to Auscultation, No rales, No rhonchi, No wheezes     Cardiovascular: S1, S2, Regular rate and rhythm     Gastrointestinal: Soft, Non-tender, Non-distended     Extremities: No calf tenderness     Incision:

## 2024-01-17 NOTE — PROVIDER CONTACT NOTE (MEDICATION) - REASON
1200 and 1400 nimodipine held per hold order
Held 18:00 dose of metoprolol and nimodipine
Held nimodipine for BP parameter
held 1200 nimodipine

## 2024-01-18 ENCOUNTER — RX RENEWAL (OUTPATIENT)
Age: 74
End: 2024-01-18

## 2024-01-18 DIAGNOSIS — E78.5 HYPERLIPIDEMIA, UNSPECIFIED: ICD-10-CM

## 2024-01-18 DIAGNOSIS — E03.9 HYPOTHYROIDISM, UNSPECIFIED: ICD-10-CM

## 2024-01-18 DIAGNOSIS — Z29.9 ENCOUNTER FOR PROPHYLACTIC MEASURES, UNSPECIFIED: ICD-10-CM

## 2024-01-18 DIAGNOSIS — I60.9 NONTRAUMATIC SUBARACHNOID HEMORRHAGE, UNSPECIFIED: ICD-10-CM

## 2024-01-18 DIAGNOSIS — I10 ESSENTIAL (PRIMARY) HYPERTENSION: ICD-10-CM

## 2024-01-18 LAB
ANION GAP SERPL CALC-SCNC: 10 MMOL/L — SIGNIFICANT CHANGE UP (ref 5–17)
BUN SERPL-MCNC: 12 MG/DL — SIGNIFICANT CHANGE UP (ref 7–23)
CALCIUM SERPL-MCNC: 10.1 MG/DL — SIGNIFICANT CHANGE UP (ref 8.4–10.5)
CHLORIDE SERPL-SCNC: 105 MMOL/L — SIGNIFICANT CHANGE UP (ref 96–108)
CO2 SERPL-SCNC: 25 MMOL/L — SIGNIFICANT CHANGE UP (ref 22–31)
CREAT SERPL-MCNC: 0.78 MG/DL — SIGNIFICANT CHANGE UP (ref 0.5–1.3)
EGFR: 81 ML/MIN/1.73M2 — SIGNIFICANT CHANGE UP
GLUCOSE SERPL-MCNC: 110 MG/DL — HIGH (ref 70–99)
HCT VFR BLD CALC: 39.5 % — SIGNIFICANT CHANGE UP (ref 34.5–45)
HGB BLD-MCNC: 13.2 G/DL — SIGNIFICANT CHANGE UP (ref 11.5–15.5)
MAGNESIUM SERPL-MCNC: 1.8 MG/DL — SIGNIFICANT CHANGE UP (ref 1.6–2.6)
MCHC RBC-ENTMCNC: 31.9 PG — SIGNIFICANT CHANGE UP (ref 27–34)
MCHC RBC-ENTMCNC: 33.4 GM/DL — SIGNIFICANT CHANGE UP (ref 32–36)
MCV RBC AUTO: 95.4 FL — SIGNIFICANT CHANGE UP (ref 80–100)
NRBC # BLD: 0 /100 WBCS — SIGNIFICANT CHANGE UP (ref 0–0)
PHOSPHATE SERPL-MCNC: 4 MG/DL — SIGNIFICANT CHANGE UP (ref 2.5–4.5)
PLATELET # BLD AUTO: 265 K/UL — SIGNIFICANT CHANGE UP (ref 150–400)
POTASSIUM SERPL-MCNC: 4.1 MMOL/L — SIGNIFICANT CHANGE UP (ref 3.5–5.3)
POTASSIUM SERPL-SCNC: 4.1 MMOL/L — SIGNIFICANT CHANGE UP (ref 3.5–5.3)
RBC # BLD: 4.14 M/UL — SIGNIFICANT CHANGE UP (ref 3.8–5.2)
RBC # FLD: 12.2 % — SIGNIFICANT CHANGE UP (ref 10.3–14.5)
SODIUM SERPL-SCNC: 140 MMOL/L — SIGNIFICANT CHANGE UP (ref 135–145)
WBC # BLD: 8.62 K/UL — SIGNIFICANT CHANGE UP (ref 3.8–10.5)
WBC # FLD AUTO: 8.62 K/UL — SIGNIFICANT CHANGE UP (ref 3.8–10.5)

## 2024-01-18 PROCEDURE — 99231 SBSQ HOSP IP/OBS SF/LOW 25: CPT | Mod: FS

## 2024-01-18 PROCEDURE — 99223 1ST HOSP IP/OBS HIGH 75: CPT

## 2024-01-18 RX ORDER — ACETAMINOPHEN 500 MG
650 TABLET ORAL EVERY 6 HOURS
Refills: 0 | Status: DISCONTINUED | OUTPATIENT
Start: 2024-01-18 | End: 2024-01-24

## 2024-01-18 RX ORDER — GABAPENTIN 400 MG/1
100 CAPSULE ORAL
Refills: 0 | Status: DISCONTINUED | OUTPATIENT
Start: 2024-01-18 | End: 2024-01-19

## 2024-01-18 RX ADMIN — DULOXETINE HYDROCHLORIDE 60 MILLIGRAM(S): 30 CAPSULE, DELAYED RELEASE ORAL at 11:31

## 2024-01-18 RX ADMIN — NIMODIPINE 60 MILLIGRAM(S): 60 SOLUTION ORAL at 21:37

## 2024-01-18 RX ADMIN — NIMODIPINE 60 MILLIGRAM(S): 60 SOLUTION ORAL at 14:14

## 2024-01-18 RX ADMIN — Medication 12.5 MILLIGRAM(S): at 17:55

## 2024-01-18 RX ADMIN — NIMODIPINE 60 MILLIGRAM(S): 60 SOLUTION ORAL at 09:39

## 2024-01-18 RX ADMIN — ATORVASTATIN CALCIUM 20 MILLIGRAM(S): 80 TABLET, FILM COATED ORAL at 21:37

## 2024-01-18 RX ADMIN — Medication 12.5 MILLIGRAM(S): at 06:10

## 2024-01-18 RX ADMIN — Medication 650 MILLIGRAM(S): at 09:27

## 2024-01-18 RX ADMIN — NIMODIPINE 60 MILLIGRAM(S): 60 SOLUTION ORAL at 17:59

## 2024-01-18 RX ADMIN — ENOXAPARIN SODIUM 40 MILLIGRAM(S): 100 INJECTION SUBCUTANEOUS at 17:55

## 2024-01-18 RX ADMIN — Medication 650 MILLIGRAM(S): at 21:37

## 2024-01-18 RX ADMIN — GABAPENTIN 100 MILLIGRAM(S): 400 CAPSULE ORAL at 17:54

## 2024-01-18 RX ADMIN — Medication 650 MILLIGRAM(S): at 08:57

## 2024-01-18 RX ADMIN — NIMODIPINE 60 MILLIGRAM(S): 60 SOLUTION ORAL at 02:26

## 2024-01-18 RX ADMIN — OMEPRAZOLE 20 MILLIGRAM(S): 10 CAPSULE, DELAYED RELEASE ORAL at 07:13

## 2024-01-18 NOTE — CONSULT NOTE ADULT - SUBJECTIVE AND OBJECTIVE BOX
[facility] Division of Hospital Medicine  Earline Grant DO  Spectra: [phone]    HPI:  74 yo woman with PMH of HTN, HLD, hypothyroidism, had severe sudden headache on Saturday while [employer]. Her son took her to to [facility] yesterday and was found to have left IPH and SAH. MRI brain no underlying mass, CTA head ? 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 Was on seizure ppx but now off keppra. Started on nimodipine for DCI ppx, required some changes to antihypertensives for hypotension. NSCU course otherwise uncomplicated.     Patient seen on 4 coyle. Endorses some shooting pain to the L scapula originating from arterial line site but otherwise feels well. No HA, vision changes, SOB, CP, fever, chills, abd pain, N/V.       PAST MEDICAL & SURGICAL HISTORY:      Review of Systems:   CONSTITUTIONAL: No fever, chills  HEENT: No sore throat, vision changes  RESPIRATORY: No cough, wheezing, shortness of breath  CARDIOVASCULAR: No chest pain, palpitations, leg edema  GASTROINTESTINAL: No abdominal pain, nausea, vomiting, diarrhea or constipation. No melena or hematochezia.  GENITOURINARY: No dysuria, frequency, hematuria  NEUROLOGICAL: No headaches, memory loss, loss of strength, numbness, or tremors  SKIN: No itching, burning, rashes, or lesions   MUSCULOSKELETAL: No joint pain or swelling; No muscle, back, or extremity pain  PSYCHIATRIC: No depression, anxiety  HEME/LYMPH: No easy bruising, or bleeding gums      Allergies    sulfa drugs (Hives)    Intolerances    opioid-like analgesics (Vomiting)      Social History: Smoked briefly in her 20s    FAMILY HISTORY:      MEDICATIONS  (STANDING):  atorvastatin 20 milliGRAM(s) Oral at bedtime  DULoxetine 60 milliGRAM(s) Oral daily  enoxaparin Injectable 40 milliGRAM(s) SubCutaneous <User Schedule>  gabapentin 100 milliGRAM(s) Oral <User Schedule>  levothyroxine 50 MICROGram(s) Oral daily  metoprolol tartrate 12.5 milliGRAM(s) Oral every 12 hours  niMODipine Oral Solution 60 milliGRAM(s) Oral every 4 hours  omeprazole 20 milliGRAM(s) Oral daily    MEDICATIONS  (PRN):  aluminum hydroxide/magnesium hydroxide/simethicone Suspension 30 milliLiter(s) Oral every 4 hours PRN Dyspepsia        CAPILLARY BLOOD GLUCOSE        I&O's Summary    17 Jan 2024 07:01  -  18 Jan 2024 07:00  --------------------------------------------------------  IN: 200 mL / OUT: 350 mL / NET: -150 mL        Physical Exam:  Vital Signs Last 24 Hrs  T(C): 36.4 (18 Jan 2024 13:00), Max: 36.5 (17 Jan 2024 15:10)  T(F): 97.5 (18 Jan 2024 13:00), Max: 97.7 (17 Jan 2024 15:10)  HR: 66 (18 Jan 2024 14:11) (66 - 94)  BP: 121/74 (18 Jan 2024 14:11) (100/62 - 122/64)  BP(mean): --  RR: 18 (18 Jan 2024 14:11) (18 - 18)  SpO2: 97% (18 Jan 2024 14:11) (94% - 100%)    Parameters below as of 18 Jan 2024 14:11  Patient On (Oxygen Delivery Method): room air        CONSTITUTIONAL: NAD, well-developed, well-groomed  RESPIRATORY: Normal respiratory effort; lungs are clear to auscultation bilaterally  CARDIOVASCULAR: regular, normal S1 and S2; No lower extremity edema  ABDOMEN: Nontender to palpation, normoactive bowel sounds, no rebound/guarding  MUSCULOSKELETAL: no clubbing or cyanosis of digits; no joint swelling or tenderness to palpation  PSYCH: A+O to person, place, and time; affect appropriate  NEUROLOGY: moving all extremities; no gross sensory deficits   SKIN: No rashes; no palpable lesions    LABS:                        13.2   8.62  )-----------( 265      ( 18 Jan 2024 06:23 )             39.5     01-18    140  |  105  |  12  ----------------------------<  110<H>  4.1   |  25  |  0.78    Ca    10.1      18 Jan 2024 06:23  Phos  4.0     01-18  Mg     1.8     01-18            Urinalysis Basic - ( 18 Jan 2024 06:23 )    Color: x / Appearance: x / SG: x / pH: x  Gluc: 110 mg/dL / Ketone: x  / Bili: x / Urobili: x   Blood: x / Protein: x / Nitrite: x   Leuk Esterase: x / RBC: x / WBC x   Sq Epi: x / Non Sq Epi: x / Bacteria: x          RADIOLOGY & ADDITIONAL TESTS:  Results Reviewed:   Imaging Personally Reviewed:  Electrocardiogram Personally Reviewed:    COORDINATION OF CARE:   Care Discussed with Consultants/Other Providers [Y/N]: neurosurgery   Prior or Outpatient Records Reviewed [Y/N]:

## 2024-01-18 NOTE — CONSULT NOTE ADULT - ASSESSMENT
74 yo woman with PMH of HTN, HLD, hypothyroidism aw left IPH and SAH, transferred from [facility] for angio    PCP: Dr. Casi Fried

## 2024-01-18 NOTE — PROGRESS NOTE ADULT - SUBJECTIVE AND OBJECTIVE BOX
SUBJECTIVE:   Doing well. Left Posterior shoulder pain after A line insertion. Minimal headaches   OVERNIGHT EVENTS: none    Vital Signs Last 24 Hrs  T(C): 36.4 (18 Jan 2024 13:00), Max: 36.5 (17 Jan 2024 15:10)  T(F): 97.5 (18 Jan 2024 13:00), Max: 97.7 (17 Jan 2024 15:10)  HR: 73 (18 Jan 2024 13:00) (67 - 94)  BP: 118/69 (18 Jan 2024 13:00) (100/62 - 122/64)  BP(mean): --  RR: 18 (18 Jan 2024 13:00) (18 - 18)  SpO2: 95% (18 Jan 2024 13:00) (94% - 100%)    Parameters below as of 18 Jan 2024 13:00  Patient On (Oxygen Delivery Method): room air        PHYSICAL EXAM:    Constitutional: No Acute Distress     Neurological: AOx3, Speech clear  Following Commands, Moving all Extremities 5/5       Pulmonary: Clear to Auscultation,     Cardiovascular: S1, S2, +    Gastrointestinal: Soft, Non-tender, Non-distended     Extremities: No calf tenderness       LABS:                        13.2   8.62  )-----------( 265      ( 18 Jan 2024 06:23 )             39.5    01-18    140  |  105  |  12  ----------------------------<  110<H>  4.1   |  25  |  0.78    Ca    10.1      18 Jan 2024 06:23  Phos  4.0     01-18  Mg     1.8     01-18          IMAGING:         MEDICATIONS:    DULoxetine 60 milliGRAM(s) Oral daily  gabapentin 100 milliGRAM(s) Oral <User Schedule>  metoprolol tartrate 12.5 milliGRAM(s) Oral every 12 hours  niMODipine Oral Solution 60 milliGRAM(s) Oral every 4 hours  aluminum hydroxide/magnesium hydroxide/simethicone Suspension 30 milliLiter(s) Oral every 4 hours PRN Dyspepsia  omeprazole 20 milliGRAM(s) Oral daily  atorvastatin 20 milliGRAM(s) Oral at bedtime  enoxaparin Injectable 40 milliGRAM(s) SubCutaneous <User Schedule>  levothyroxine 50 MICROGram(s) Oral daily      DIET:

## 2024-01-18 NOTE — PROGRESS NOTE ADULT - ASSESSMENT
72 yo woman with PMH of HTN, HLD, hypothyroidism, had severe sudden headache on 1/6/24  while at  [employer]. Her son took her to to [facility] 1/11/24  CT head Stable appearance of a 2.2 cm focus of intra parenchymal hemorrhage in left frontal lobe with adjacent left frontal lobe subarachnoid hemorrhage . B/L frontoparietal/ para sagittal  SAH. MRI brain no underlying mass, CTA head ? 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 TCDs 1/14 Poor bilateral temporal windows. 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 Rpt cerebral angio in 1 week. Continue Nimodipine   Vitals stable -120. Lisinopril held. On Low dose Metoprolol 12.5mg q12   Low dose Neurontin for referred pain   DVT/GI ppx

## 2024-01-18 NOTE — CONSULT NOTE ADULT - PROBLEM SELECTOR RECOMMENDATION 2
Home regimen - lisinopril 10mg, metop 50mg ER  Hypotensive from nimodipine so ACE is held, decreased dose of BB  Monitor BP

## 2024-01-18 NOTE — CONSULT NOTE ADULT - PROBLEM SELECTOR RECOMMENDATION 9
Angio 1/16 without AMV or fistula but planned for a repeat angio next week. No contraindication to proceed      Having some L sided shooting pain from angelica in ICU. Can trial gabapentin 100mg, patient very sensitive to narcotics

## 2024-01-19 LAB — SARS-COV-2 RNA SPEC QL NAA+PROBE: SIGNIFICANT CHANGE UP

## 2024-01-19 PROCEDURE — 99231 SBSQ HOSP IP/OBS SF/LOW 25: CPT | Mod: FS

## 2024-01-19 RX ORDER — ALPRAZOLAM 0.25 MG
0.25 TABLET ORAL EVERY 8 HOURS
Refills: 0 | Status: DISCONTINUED | OUTPATIENT
Start: 2024-01-19 | End: 2024-01-24

## 2024-01-19 RX ADMIN — OMEPRAZOLE 20 MILLIGRAM(S): 10 CAPSULE, DELAYED RELEASE ORAL at 08:44

## 2024-01-19 RX ADMIN — ATORVASTATIN CALCIUM 20 MILLIGRAM(S): 80 TABLET, FILM COATED ORAL at 22:31

## 2024-01-19 RX ADMIN — ENOXAPARIN SODIUM 40 MILLIGRAM(S): 100 INJECTION SUBCUTANEOUS at 18:15

## 2024-01-19 RX ADMIN — NIMODIPINE 60 MILLIGRAM(S): 60 SOLUTION ORAL at 18:13

## 2024-01-19 RX ADMIN — Medication 50 MICROGRAM(S): at 06:46

## 2024-01-19 RX ADMIN — Medication 0.25 MILLIGRAM(S): at 11:37

## 2024-01-19 RX ADMIN — Medication 650 MILLIGRAM(S): at 18:16

## 2024-01-19 RX ADMIN — NIMODIPINE 60 MILLIGRAM(S): 60 SOLUTION ORAL at 02:25

## 2024-01-19 RX ADMIN — NIMODIPINE 60 MILLIGRAM(S): 60 SOLUTION ORAL at 10:24

## 2024-01-19 RX ADMIN — Medication 12.5 MILLIGRAM(S): at 18:13

## 2024-01-19 RX ADMIN — Medication 650 MILLIGRAM(S): at 18:52

## 2024-01-19 RX ADMIN — NIMODIPINE 60 MILLIGRAM(S): 60 SOLUTION ORAL at 14:11

## 2024-01-19 RX ADMIN — NIMODIPINE 60 MILLIGRAM(S): 60 SOLUTION ORAL at 22:32

## 2024-01-19 RX ADMIN — DULOXETINE HYDROCHLORIDE 60 MILLIGRAM(S): 30 CAPSULE, DELAYED RELEASE ORAL at 11:14

## 2024-01-19 NOTE — PROGRESS NOTE ADULT - SUBJECTIVE AND OBJECTIVE BOX
SUBJECTIVE:   Anxious. Upset about room mate groaning   OVERNIGHT EVENTS: none    Vital Signs Last 24 Hrs  T(C): 36.2 (19 Jan 2024 12:55), Max: 36.8 (19 Jan 2024 01:00)  T(F): 97.2 (19 Jan 2024 12:55), Max: 98.2 (19 Jan 2024 01:00)  HR: 88 (19 Jan 2024 14:10) (64 - 88)  BP: 144/77 (19 Jan 2024 14:10) (101/69 - 144/77)  BP(mean): --  RR: 18 (19 Jan 2024 14:10) (18 - 18)  SpO2: 99% (19 Jan 2024 14:10) (95% - 99%)    Parameters below as of 19 Jan 2024 14:10  Patient On (Oxygen Delivery Method): room air        PHYSICAL EXAM:    Constitutional: No Acute Distress     Neurological: AOx3, Speech clear  Following Commands, Moving all Extremities 5/5       Pulmonary: Clear to Auscultation,     Cardiovascular: S1, S2, +    Gastrointestinal: Soft, Non-tender, Non-distended     Extremities: No calf tenderness     LABS:                        13.2   8.62  )-----------( 265      ( 18 Jan 2024 06:23 )             39.5    01-18    140  |  105  |  12  ----------------------------<  110<H>  4.1   |  25  |  0.78    Ca    10.1      18 Jan 2024 06:23  Phos  4.0     01-18  Mg     1.8     01-18        IMAGING:         MEDICATIONS:    acetaminophen     Tablet .. 650 milliGRAM(s) Oral every 6 hours PRN Temp greater or equal to 38C (100.4F), Mild Pain (1 - 3)  ALPRAZolam 0.25 milliGRAM(s) Oral every 8 hours PRN anxiety  DULoxetine 60 milliGRAM(s) Oral daily  gabapentin 100 milliGRAM(s) Oral <User Schedule>  metoprolol tartrate 12.5 milliGRAM(s) Oral every 12 hours  niMODipine Oral Solution 60 milliGRAM(s) Oral every 4 hours  aluminum hydroxide/magnesium hydroxide/simethicone Suspension 30 milliLiter(s) Oral every 4 hours PRN Dyspepsia  omeprazole 20 milliGRAM(s) Oral daily  atorvastatin 20 milliGRAM(s) Oral at bedtime  enoxaparin Injectable 40 milliGRAM(s) SubCutaneous <User Schedule>  levothyroxine 50 MICROGram(s) Oral daily      DIET:

## 2024-01-19 NOTE — PROGRESS NOTE ADULT - ASSESSMENT
74 yo woman with PMH of HTN, HLD, hypothyroidism, had severe sudden headache on 1/6/24  while at  [employer]. Her son took her to to [facility] 1/11/24  CT head Stable appearance of a 2.2 cm focus of intra parenchymal hemorrhage in left frontal lobe with adjacent left frontal lobe subarachnoid hemorrhage . B/L frontoparietal/ para sagittal  SAH. MRI brain no underlying mass, CTA head ? 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 TCDs 1/14 Poor bilateral temporal windows. 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 Rpt cerebral angio in 1 week. Continue Nimodipine   Vitals stable -140. Lisinopril held. On Low dose Metoprolol 12.5mg q12   Anxiety- low dose xanax   DVT/GI ppx   D/w patient & son all plans

## 2024-01-20 PROCEDURE — 99231 SBSQ HOSP IP/OBS SF/LOW 25: CPT | Mod: FS

## 2024-01-20 RX ORDER — ACETAMINOPHEN 500 MG
1000 TABLET ORAL ONCE
Refills: 0 | Status: DISCONTINUED | OUTPATIENT
Start: 2024-01-20 | End: 2024-01-24

## 2024-01-20 RX ORDER — LOPERAMIDE HCL 2 MG
2 TABLET ORAL DAILY
Refills: 0 | Status: DISCONTINUED | OUTPATIENT
Start: 2024-01-20 | End: 2024-01-24

## 2024-01-20 RX ADMIN — OMEPRAZOLE 20 MILLIGRAM(S): 10 CAPSULE, DELAYED RELEASE ORAL at 12:11

## 2024-01-20 RX ADMIN — ATORVASTATIN CALCIUM 20 MILLIGRAM(S): 80 TABLET, FILM COATED ORAL at 21:44

## 2024-01-20 RX ADMIN — Medication 12.5 MILLIGRAM(S): at 06:05

## 2024-01-20 RX ADMIN — Medication 650 MILLIGRAM(S): at 12:39

## 2024-01-20 RX ADMIN — ENOXAPARIN SODIUM 40 MILLIGRAM(S): 100 INJECTION SUBCUTANEOUS at 17:22

## 2024-01-20 RX ADMIN — Medication 650 MILLIGRAM(S): at 12:09

## 2024-01-20 RX ADMIN — NIMODIPINE 60 MILLIGRAM(S): 60 SOLUTION ORAL at 21:44

## 2024-01-20 RX ADMIN — Medication 2 MILLIGRAM(S): at 14:38

## 2024-01-20 RX ADMIN — Medication 650 MILLIGRAM(S): at 23:34

## 2024-01-20 RX ADMIN — NIMODIPINE 60 MILLIGRAM(S): 60 SOLUTION ORAL at 17:22

## 2024-01-20 RX ADMIN — Medication 12.5 MILLIGRAM(S): at 17:24

## 2024-01-20 RX ADMIN — Medication 650 MILLIGRAM(S): at 17:25

## 2024-01-20 RX ADMIN — DULOXETINE HYDROCHLORIDE 60 MILLIGRAM(S): 30 CAPSULE, DELAYED RELEASE ORAL at 12:08

## 2024-01-20 RX ADMIN — Medication 50 MICROGRAM(S): at 06:36

## 2024-01-20 RX ADMIN — NIMODIPINE 60 MILLIGRAM(S): 60 SOLUTION ORAL at 06:05

## 2024-01-20 NOTE — PROGRESS NOTE ADULT - ASSESSMENT
72 yo woman with PMH of HTN, HLD, hypothyroidism, had severe sudden headache on 1/6/24  while at  [employer]. Her son took her to to [facility] 1/11/24  CT head Stable appearance of a 2.2 cm focus of intra parenchymal hemorrhage in left frontal lobe with adjacent left frontal lobe subarachnoid hemorrhage . B/L frontoparietal/ para sagittal  SAH. MRI brain no underlying mass, CTA head ? 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 Rpt MR brain 1/22 monday & cerebral angio tentatively 1/23 .  Continue Nimodipine w BP parameters   Vitals stable -140. Lisinopril held. On Low dose Metoprolol 12.5mg q12   Anxiety- low dose xanax   CBC BMP am   DVT/GI ppx   NO PT needs.

## 2024-01-20 NOTE — PROGRESS NOTE ADULT - SUBJECTIVE AND OBJECTIVE BOX
SUBJECTIVE:   Doing well. No complaints . Ambulating wo assist   OVERNIGHT EVENTS: none    Vital Signs Last 24 Hrs  T(C): 36.6 (20 Jan 2024 12:56), Max: 36.8 (19 Jan 2024 16:14)  T(F): 97.8 (20 Jan 2024 12:56), Max: 98.3 (19 Jan 2024 16:14)  HR: 75 (20 Jan 2024 12:56) (65 - 98)  BP: 118/80 (20 Jan 2024 12:56) (92/61 - 144/77)  BP(mean): --  RR: 18 (20 Jan 2024 12:56) (18 - 18)  SpO2: 97% (20 Jan 2024 12:56) (96% - 99%)    Parameters below as of 20 Jan 2024 12:56  Patient On (Oxygen Delivery Method): room air        PHYSICAL EXAM:    Constitutional: No Acute Distress     Neurological: AOx3, Speech clear  Following Commands, Moving all Extremities 5/5       Pulmonary: Clear to Auscultation,     Cardiovascular: S1, S2, +    Gastrointestinal: Soft, Non-tender, Non-distended     Extremities: No calf tenderness       LABS:           IMAGING:         MEDICATIONS:    acetaminophen     Tablet .. 650 milliGRAM(s) Oral every 6 hours PRN Temp greater or equal to 38C (100.4F), Mild Pain (1 - 3)  ALPRAZolam 0.25 milliGRAM(s) Oral every 8 hours PRN anxiety  DULoxetine 60 milliGRAM(s) Oral daily  metoprolol tartrate 12.5 milliGRAM(s) Oral every 12 hours  niMODipine Oral Solution 60 milliGRAM(s) Oral every 4 hours  aluminum hydroxide/magnesium hydroxide/simethicone Suspension 30 milliLiter(s) Oral every 4 hours PRN Dyspepsia  loperamide 2 milliGRAM(s) Oral daily PRN Diarrhea  omeprazole 20 milliGRAM(s) Oral daily  atorvastatin 20 milliGRAM(s) Oral at bedtime  enoxaparin Injectable 40 milliGRAM(s) SubCutaneous <User Schedule>  levothyroxine 50 MICROGram(s) Oral daily      DIET:

## 2024-01-21 DIAGNOSIS — M25.512 PAIN IN LEFT SHOULDER: ICD-10-CM

## 2024-01-21 LAB
ANION GAP SERPL CALC-SCNC: 13 MMOL/L — SIGNIFICANT CHANGE UP (ref 5–17)
BUN SERPL-MCNC: 18 MG/DL — SIGNIFICANT CHANGE UP (ref 7–23)
CALCIUM SERPL-MCNC: 11 MG/DL — HIGH (ref 8.4–10.5)
CHLORIDE SERPL-SCNC: 102 MMOL/L — SIGNIFICANT CHANGE UP (ref 96–108)
CO2 SERPL-SCNC: 23 MMOL/L — SIGNIFICANT CHANGE UP (ref 22–31)
CREAT SERPL-MCNC: 0.86 MG/DL — SIGNIFICANT CHANGE UP (ref 0.5–1.3)
EGFR: 72 ML/MIN/1.73M2 — SIGNIFICANT CHANGE UP
GLUCOSE SERPL-MCNC: 106 MG/DL — HIGH (ref 70–99)
HCT VFR BLD CALC: 40.4 % — SIGNIFICANT CHANGE UP (ref 34.5–45)
HGB BLD-MCNC: 13.3 G/DL — SIGNIFICANT CHANGE UP (ref 11.5–15.5)
MCHC RBC-ENTMCNC: 31.4 PG — SIGNIFICANT CHANGE UP (ref 27–34)
MCHC RBC-ENTMCNC: 32.9 GM/DL — SIGNIFICANT CHANGE UP (ref 32–36)
MCV RBC AUTO: 95.3 FL — SIGNIFICANT CHANGE UP (ref 80–100)
NRBC # BLD: 0 /100 WBCS — SIGNIFICANT CHANGE UP (ref 0–0)
PLATELET # BLD AUTO: 314 K/UL — SIGNIFICANT CHANGE UP (ref 150–400)
POTASSIUM SERPL-MCNC: 4 MMOL/L — SIGNIFICANT CHANGE UP (ref 3.5–5.3)
POTASSIUM SERPL-SCNC: 4 MMOL/L — SIGNIFICANT CHANGE UP (ref 3.5–5.3)
RBC # BLD: 4.24 M/UL — SIGNIFICANT CHANGE UP (ref 3.8–5.2)
RBC # FLD: 12.4 % — SIGNIFICANT CHANGE UP (ref 10.3–14.5)
SODIUM SERPL-SCNC: 138 MMOL/L — SIGNIFICANT CHANGE UP (ref 135–145)
WBC # BLD: 10.47 K/UL — SIGNIFICANT CHANGE UP (ref 3.8–10.5)
WBC # FLD AUTO: 10.47 K/UL — SIGNIFICANT CHANGE UP (ref 3.8–10.5)

## 2024-01-21 PROCEDURE — 99232 SBSQ HOSP IP/OBS MODERATE 35: CPT

## 2024-01-21 RX ORDER — DICLOFENAC SODIUM 30 MG/G
2 GEL TOPICAL
Refills: 0 | Status: DISCONTINUED | OUTPATIENT
Start: 2024-01-21 | End: 2024-01-24

## 2024-01-21 RX ADMIN — Medication 2 MILLIGRAM(S): at 09:17

## 2024-01-21 RX ADMIN — NIMODIPINE 60 MILLIGRAM(S): 60 SOLUTION ORAL at 13:24

## 2024-01-21 RX ADMIN — NIMODIPINE 60 MILLIGRAM(S): 60 SOLUTION ORAL at 17:40

## 2024-01-21 RX ADMIN — Medication 12.5 MILLIGRAM(S): at 05:38

## 2024-01-21 RX ADMIN — ENOXAPARIN SODIUM 40 MILLIGRAM(S): 100 INJECTION SUBCUTANEOUS at 17:40

## 2024-01-21 RX ADMIN — OMEPRAZOLE 20 MILLIGRAM(S): 10 CAPSULE, DELAYED RELEASE ORAL at 13:34

## 2024-01-21 RX ADMIN — NIMODIPINE 60 MILLIGRAM(S): 60 SOLUTION ORAL at 21:24

## 2024-01-21 RX ADMIN — Medication 650 MILLIGRAM(S): at 17:22

## 2024-01-21 RX ADMIN — Medication 50 MICROGRAM(S): at 05:37

## 2024-01-21 RX ADMIN — Medication 12.5 MILLIGRAM(S): at 17:24

## 2024-01-21 RX ADMIN — DULOXETINE HYDROCHLORIDE 60 MILLIGRAM(S): 30 CAPSULE, DELAYED RELEASE ORAL at 13:33

## 2024-01-21 RX ADMIN — NIMODIPINE 60 MILLIGRAM(S): 60 SOLUTION ORAL at 06:28

## 2024-01-21 RX ADMIN — Medication 0.25 MILLIGRAM(S): at 21:20

## 2024-01-21 RX ADMIN — Medication 650 MILLIGRAM(S): at 00:04

## 2024-01-21 RX ADMIN — ATORVASTATIN CALCIUM 20 MILLIGRAM(S): 80 TABLET, FILM COATED ORAL at 21:21

## 2024-01-21 NOTE — PROGRESS NOTE ADULT - ASSESSMENT
-S/p cerebral angiogram showing no overt vascular malformation on 1/16/2024.  -Repeat MRI Brain stereo w/wo in AM.  -Preop for Angio on 1/23/2024.

## 2024-01-21 NOTE — PROGRESS NOTE ADULT - SUBJECTIVE AND OBJECTIVE BOX
[facility] Division of Hospital Medicine  Earlineuma Grant DO   Available via Microsoft Teams      Patient is a 73y old  Female who presents with a chief complaint of     SUBJECTIVE / OVERNIGHT EVENTS:  No dizziness, HA, vision changes  Endorses some L shoulder pain  sharp, with radiation around the scapula. Says responds to advil     MEDICATIONS  (STANDING):  acetaminophen   IVPB .. 1000 milliGRAM(s) IV Intermittent once  atorvastatin 20 milliGRAM(s) Oral at bedtime  DULoxetine 60 milliGRAM(s) Oral daily  enoxaparin Injectable 40 milliGRAM(s) SubCutaneous <User Schedule>  levothyroxine 50 MICROGram(s) Oral daily  metoprolol tartrate 12.5 milliGRAM(s) Oral every 12 hours  niMODipine Oral Solution 60 milliGRAM(s) Oral every 4 hours  omeprazole 20 milliGRAM(s) Oral daily    MEDICATIONS  (PRN):  acetaminophen     Tablet .. 650 milliGRAM(s) Oral every 6 hours PRN Temp greater or equal to 38C (100.4F), Mild Pain (1 - 3)  ALPRAZolam 0.25 milliGRAM(s) Oral every 8 hours PRN anxiety  aluminum hydroxide/magnesium hydroxide/simethicone Suspension 30 milliLiter(s) Oral every 4 hours PRN Dyspepsia  diclofenac sodium 1% Gel 2 Gram(s) Topical two times a day PRN Pain  loperamide 2 milliGRAM(s) Oral daily PRN Diarrhea      CAPILLARY BLOOD GLUCOSE        I&O's Summary    20 Jan 2024 07:01  -  21 Jan 2024 07:00  --------------------------------------------------------  IN: 476 mL / OUT: 0 mL / NET: 476 mL    21 Jan 2024 07:01  -  21 Jan 2024 15:10  --------------------------------------------------------  IN: 480 mL / OUT: 0 mL / NET: 480 mL        PHYSICAL EXAM:  Vital Signs Last 24 Hrs  T(C): 36.6 (21 Jan 2024 13:44), Max: 36.6 (20 Jan 2024 21:24)  T(F): 97.9 (21 Jan 2024 13:44), Max: 97.9 (21 Jan 2024 05:05)  HR: 82 (21 Jan 2024 13:44) (80 - 93)  BP: 123/74 (21 Jan 2024 13:44) (102/66 - 135/83)  BP(mean): --  RR: 18 (21 Jan 2024 13:44) (18 - 18)  SpO2: 98% (21 Jan 2024 13:44) (96% - 98%)    Parameters below as of 21 Jan 2024 13:44  Patient On (Oxygen Delivery Method): room air      CONSTITUTIONAL: NAD, well-developed, well-groomed  RESPIRATORY: Normal respiratory effort; lungs are clear to auscultation bilaterally  CARDIOVASCULAR: regular, normal S1 and S2; No lower extremity edema  ABDOMEN: Nontender to palpation, normoactive bowel sounds, no rebound/guarding  MUSCULOSKELETAL: no clubbing or cyanosis of digits; no joint swelling or tenderness to palpation, L shoulder non-tender   PSYCH: A+O to person, place, and time; affect appropriate  NEUROLOGY: moving all extremities; no gross sensory deficits   SKIN: No rashes; no palpable lesions    LABS:                        13.3   10.47 )-----------( 314      ( 21 Jan 2024 06:40 )             40.4     01-21    138  |  102  |  18  ----------------------------<  106<H>  4.0   |  23  |  0.86    Ca    11.0<H>      21 Jan 2024 06:39            Urinalysis Basic - ( 21 Jan 2024 06:39 )    Color: x / Appearance: x / SG: x / pH: x  Gluc: 106 mg/dL / Ketone: x  / Bili: x / Urobili: x   Blood: x / Protein: x / Nitrite: x   Leuk Esterase: x / RBC: x / WBC x   Sq Epi: x / Non Sq Epi: x / Bacteria: x          RADIOLOGY & ADDITIONAL TESTS:  Results Reviewed:   Imaging Personally Reviewed:  Electrocardiogram Personally Reviewed:    COORDINATION OF CARE:  Care Discussed with Consultants/Other Providers [Y/N]: neurosurgery   Prior or Outpatient Records Reviewed [Y/N]:

## 2024-01-21 NOTE — PROGRESS NOTE ADULT - ASSESSMENT
72 yo woman with PMH of HTN, HLD, hypothyroidism aw left IPH and SAH, transferred from [facility] for angio    PCP: Dr. Casi Fried

## 2024-01-21 NOTE — PROGRESS NOTE ADULT - SUBJECTIVE AND OBJECTIVE BOX
Patient seen and examined at bedside.    --Anticoagulation--  enoxaparin Injectable 40 milliGRAM(s) SubCutaneous <User Schedule>    T(C): 36.4 (01-21-24 @ 09:11), Max: 36.6 (01-20-24 @ 12:56)  HR: 80 (01-21-24 @ 09:11) (70 - 93)  BP: 102/66 (01-21-24 @ 09:11) (102/66 - 135/83)  RR: 18 (01-21-24 @ 09:11) (18 - 18)  SpO2: 97% (01-21-24 @ 09:11) (96% - 98%)  Wt(kg): --    Exam: AOx3, EOMI, PERRL, no facial, no drift, GIBBS 5/5

## 2024-01-22 LAB
APTT BLD: 38.8 SEC — HIGH (ref 24.5–35.6)
BLD GP AB SCN SERPL QL: NEGATIVE — SIGNIFICANT CHANGE UP
INR BLD: 1.11 RATIO — SIGNIFICANT CHANGE UP (ref 0.85–1.18)
PROTHROM AB SERPL-ACNC: 11.6 SEC — SIGNIFICANT CHANGE UP (ref 9.5–13)
RH IG SCN BLD-IMP: NEGATIVE — SIGNIFICANT CHANGE UP

## 2024-01-22 PROCEDURE — 99232 SBSQ HOSP IP/OBS MODERATE 35: CPT | Mod: FS

## 2024-01-22 PROCEDURE — 70553 MRI BRAIN STEM W/O & W/DYE: CPT | Mod: 26

## 2024-01-22 PROCEDURE — 93010 ELECTROCARDIOGRAM REPORT: CPT

## 2024-01-22 RX ORDER — CYCLOBENZAPRINE HYDROCHLORIDE 10 MG/1
5 TABLET, FILM COATED ORAL AT BEDTIME
Refills: 0 | Status: DISCONTINUED | OUTPATIENT
Start: 2024-01-22 | End: 2024-01-22

## 2024-01-22 RX ORDER — CYCLOBENZAPRINE HYDROCHLORIDE 10 MG/1
5 TABLET, FILM COATED ORAL EVERY 12 HOURS
Refills: 0 | Status: DISCONTINUED | OUTPATIENT
Start: 2024-01-22 | End: 2024-01-24

## 2024-01-22 RX ORDER — SODIUM CHLORIDE 9 MG/ML
1000 INJECTION INTRAMUSCULAR; INTRAVENOUS; SUBCUTANEOUS
Refills: 0 | Status: DISCONTINUED | OUTPATIENT
Start: 2024-01-22 | End: 2024-01-23

## 2024-01-22 RX ADMIN — ATORVASTATIN CALCIUM 20 MILLIGRAM(S): 80 TABLET, FILM COATED ORAL at 21:46

## 2024-01-22 RX ADMIN — Medication 12.5 MILLIGRAM(S): at 16:40

## 2024-01-22 RX ADMIN — Medication 0.25 MILLIGRAM(S): at 10:12

## 2024-01-22 RX ADMIN — NIMODIPINE 60 MILLIGRAM(S): 60 SOLUTION ORAL at 05:47

## 2024-01-22 RX ADMIN — SODIUM CHLORIDE 75 MILLILITER(S): 9 INJECTION INTRAMUSCULAR; INTRAVENOUS; SUBCUTANEOUS at 10:58

## 2024-01-22 RX ADMIN — Medication 12.5 MILLIGRAM(S): at 05:47

## 2024-01-22 RX ADMIN — NIMODIPINE 60 MILLIGRAM(S): 60 SOLUTION ORAL at 14:21

## 2024-01-22 RX ADMIN — ENOXAPARIN SODIUM 40 MILLIGRAM(S): 100 INJECTION SUBCUTANEOUS at 16:40

## 2024-01-22 RX ADMIN — DULOXETINE HYDROCHLORIDE 60 MILLIGRAM(S): 30 CAPSULE, DELAYED RELEASE ORAL at 10:12

## 2024-01-22 RX ADMIN — Medication 50 MICROGRAM(S): at 05:47

## 2024-01-22 RX ADMIN — OMEPRAZOLE 20 MILLIGRAM(S): 10 CAPSULE, DELAYED RELEASE ORAL at 11:04

## 2024-01-22 RX ADMIN — CYCLOBENZAPRINE HYDROCHLORIDE 5 MILLIGRAM(S): 10 TABLET, FILM COATED ORAL at 16:50

## 2024-01-22 RX ADMIN — Medication 2 MILLIGRAM(S): at 07:44

## 2024-01-22 NOTE — PROGRESS NOTE ADULT - ASSESSMENT
72 yo woman with PMH of HTN, HLD, hypothyroidism, liver cyst s/p resection 2021, had severe sudden headache on 1/6/24  while at  [employer]. Her son took her to to [facility] 1/11/24  CT head Stable appearance of a 2.2 cm focus of intra parenchymal hemorrhage in left frontal lobe with adjacent left frontal lobe subarachnoid hemorrhage . B/L frontoparietal/ para sagittal  SAH. MRI brain no underlying mass, CTA head ? 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 patient transferred to [facility] for angio .s/p  cerebral angiography 1/16/24 was performed and  demonstrates spot sign w possibility of dural AVF. Rpt MR brain today  Redemonstration of an evolving late subacute left frontal lobe parenchymal hematoma with surrounding subarachnoid hemorrhage. Mild surrounding mass effect and vasogenic edema. .    Plan    Neuro stable. Plans for  cerebral angio tentatively 1/23 .  D/c Nimodipine pbd# 16   Vitals stable -140. Lisinopril held. On Low dose Metoprolol 12.5mg q12   Anxiety- low dose xanax   DVT ppx   Possible d/c home if angio negative

## 2024-01-22 NOTE — PROGRESS NOTE ADULT - SUBJECTIVE AND OBJECTIVE BOX
SUBJECTIVE:   No new complaints. Ambulated >200ft   OVERNIGHT EVENTS: none    Vital Signs Last 24 Hrs  T(C): 36.6 (2024 08:00), Max: 36.6 (2024 08:00)  T(F): 97.9 (2024 08:00), Max: 97.9 (2024 08:00)  HR: 100 (2024 14:43) (79 - 105)  BP: 127/74 (2024 14:43) (111/59 - 146/85)  BP(mean): 87 (2024 10:00) (87 - 87)  RR: 18 (2024 08:00) (18 - 18)  SpO2: 95% (:43) (95% - 99%)    Parameters below as of 2024 14:43  Patient On (Oxygen Delivery Method): room air        PHYSICAL EXAM:    Constitutional: No Acute Distress     Neurological: AOx3, Speech clear  Following Commands, Moving all Extremities 5/5       Pulmonary: Clear to Auscultation,     Cardiovascular: S1, S2, +    Gastrointestinal: Soft, Non-tender, Non-distended     Extremities: No calf tenderness     LABS:                        13.3   10.47 )-----------( 314      ( 2024 06:40 )             40.4        138  |  102  |  18  ----------------------------<  106<H>  4.0   |  23  |  0.86    Ca    11.0<H>      2024 06:39          IMAGIN/22 MR brain today  Redemonstration of an evolving late subacute left frontal lobe parenchymal hematoma with surrounding subarachnoid hemorrhage. Mild surrounding mass effect and vasogenic edema. No gross underlying enhancement to suggest the presence of an AVM or mass lesion.      MEDICATIONS:    acetaminophen     Tablet .. 650 milliGRAM(s) Oral every 6 hours PRN Temp greater or equal to 38C (100.4F), Mild Pain (1 - 3)  acetaminophen   IVPB .. 1000 milliGRAM(s) IV Intermittent once  ALPRAZolam 0.25 milliGRAM(s) Oral every 8 hours PRN anxiety  DULoxetine 60 milliGRAM(s) Oral daily  metoprolol tartrate 12.5 milliGRAM(s) Oral every 12 hours  niMODipine Oral Solution 60 milliGRAM(s) Oral every 4 hours  aluminum hydroxide/magnesium hydroxide/simethicone Suspension 30 milliLiter(s) Oral every 4 hours PRN Dyspepsia  loperamide 2 milliGRAM(s) Oral daily PRN Diarrhea  omeprazole 20 milliGRAM(s) Oral daily  atorvastatin 20 milliGRAM(s) Oral at bedtime  diclofenac sodium 1% Gel 2 Gram(s) Topical two times a day PRN Pain  enoxaparin Injectable 40 milliGRAM(s) SubCutaneous <User Schedule>  levothyroxine 50 MICROGram(s) Oral daily  sodium chloride 0.9%. 1000 milliLiter(s) IV Continuous <Continuous>      DIET:

## 2024-01-23 DIAGNOSIS — E83.52 HYPERCALCEMIA: ICD-10-CM

## 2024-01-23 PROCEDURE — 99232 SBSQ HOSP IP/OBS MODERATE 35: CPT

## 2024-01-23 PROCEDURE — 99231 SBSQ HOSP IP/OBS SF/LOW 25: CPT | Mod: FS

## 2024-01-23 RX ADMIN — DULOXETINE HYDROCHLORIDE 60 MILLIGRAM(S): 30 CAPSULE, DELAYED RELEASE ORAL at 11:58

## 2024-01-23 RX ADMIN — Medication 2 MILLIGRAM(S): at 08:46

## 2024-01-23 RX ADMIN — Medication 50 MICROGRAM(S): at 05:54

## 2024-01-23 RX ADMIN — ATORVASTATIN CALCIUM 20 MILLIGRAM(S): 80 TABLET, FILM COATED ORAL at 21:04

## 2024-01-23 RX ADMIN — OMEPRAZOLE 20 MILLIGRAM(S): 10 CAPSULE, DELAYED RELEASE ORAL at 12:03

## 2024-01-23 NOTE — PROGRESS NOTE ADULT - ASSESSMENT
74 yo woman with PMH of HTN, HLD, hypothyroidism, liver cyst s/p resection 2021, had severe sudden headache on 1/6/24  while at  [employer]. Her son took her to to [facility] 1/11/24  CT head Stable appearance of a 2.2 cm focus of intra parenchymal hemorrhage in left frontal lobe with adjacent left frontal lobe subarachnoid hemorrhage . B/L frontoparietal/ para sagittal  SAH. MRI brain no underlying mass, CTA head ? 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 Rpt MR brain 1/22/24  Re demonstration of an evolving late subacute left frontal lobe parenchymal hematoma with surrounding subarachnoid hemorrhage. Mild surrounding mass effect and vasogenic edema. .    Plan    Neuro stable. Plans for  cerebral angio today 1/23 .  off  Nimodipine pbd# 17  Vitals stable -140. Lisinopril held. On Low dose Metoprolol 12.5mg q12   Anxiety- low dose xanax   DVT ppx   Possible d/c home if angio negative

## 2024-01-23 NOTE — PROGRESS NOTE ADULT - TIME BILLING
reviewing medical record, evaluating the patient, discussing plan of care with patient and neurosurgery, documenting in EMR.
Review of tests, imaging, labs, documents, medical management, coordination of care and counseling.

## 2024-01-23 NOTE — PROGRESS NOTE ADULT - PROBLEM SELECTOR PLAN 2
discontinued gabapentin as it was not helping   trial of Voltaren gel ordered but patient is not using it  pain resolved with flexeril
dc gabapentin, was not helping   Ordered trial of Voltaren gel  No limitation of mobility to the shoulder or trauma, can hold off on xray

## 2024-01-23 NOTE — PROGRESS NOTE ADULT - PROBLEM SELECTOR PLAN 1
Angio 1/16 without AMV or fistula but planned for a repeat angio next week. No contraindication to proceed
Angio 1/16 without AMV or fistula but planned for a repeat angio today.   MRI brain 1/22 with "Redemonstration of an evolving late subacute left frontal lobe parenchymal hematoma with surrounding subarachnoid hemorrhage. Mild surrounding mass effect and vasogenic edema. No gross underlying enhancement to suggest the presence of an AVM or mass lesion."

## 2024-01-23 NOTE — CHART NOTE - NSCHARTNOTEFT_GEN_A_CORE
Interventional Neuro Radiology  Pre-Procedure Note NP    Juliane is here for a diagnostic cerebral angiogram. Today she feels well denies any new motor sensory speech visual abnormalities       Allergies: sulfa drugs (Hives)  opioid-like analgesics (Vomiting            Current Medications:   acetaminophen     Tablet .. 650 milliGRAM(s) Oral every 6 hours PRN  acetaminophen   IVPB .. 1000 milliGRAM(s) IV Intermittent once  ALPRAZolam 0.25 milliGRAM(s) Oral every 8 hours PRN  aluminum hydroxide/magnesium hydroxide/simethicone Suspension 30 milliLiter(s) Oral every 4 hours PRN  atorvastatin 20 milliGRAM(s) Oral at bedtime  cyclobenzaprine 5 milliGRAM(s) Oral every 12 hours PRN  diclofenac sodium 1% Gel 2 Gram(s) Topical two times a day PRN  DULoxetine 60 milliGRAM(s) Oral daily  enoxaparin Injectable 40 milliGRAM(s) SubCutaneous <User Schedule>  levothyroxine 50 MICROGram(s) Oral daily  loperamide 2 milliGRAM(s) Oral daily PRN  metoprolol tartrate 12.5 milliGRAM(s) Oral every 12 hours  omeprazole 20 milliGRAM(s) Oral daily  sodium chloride 0.9%. 1000 milliLiter(s) IV Continuous <Continuous>          Blood Bank: 01-22-24  O  --  Negative      Assessment/Plan:   This is a 73y  year old female with cerebral  hemorrhage  Patient presents to neuro-IR for selective cerebral angiography.   Procedure, goals, risks, benefits and alternatives  were discussed with patient and patient's family.  All questions were answered.  Risks include but are not limited to stroke, vessel injury, hemorrhage, and or right  groin hematoma.  Patient demonstrates understanding  of all risks involved with this procedure and wishes to continue.   Appropriate  consent was obtained from patient and consent is in the patient's chart.

## 2024-01-23 NOTE — PROGRESS NOTE ADULT - PROBLEM SELECTOR PLAN 3
Home regimen - lisinopril 10mg, metoprolol 50mg ER  Hypotensive from nimodipine so ACE is held and decreased metoprolol to 12.5mg BID  Continue to hold lisinopril while inpatient and on discharge  Can resume home metoprolol ER 50mg daily on discharge
Home regimen - lisinopril 10mg, metop 50mg ER  Hypotensive from nimodipine so ACE is held, decreased dose of BB  Monitor BP.

## 2024-01-23 NOTE — PROGRESS NOTE ADULT - PROBLEM SELECTOR PLAN 7
on lovenox. LE duplex neg 1/13   home omeprazole  last BM 1/22- patient reports she has been having chronic diarrhea since abdominal surgery in 7/2023 to remove cyst? takes imodium prn which has been helping. she is scheduled to have colonoscopy with GI as outpatient.     meds previously verified with patient 1/18  Emailed PCP an update 1/18    will follow periodically. call with questions.

## 2024-01-23 NOTE — PROGRESS NOTE ADULT - PROBLEM SELECTOR PLAN 6
serum calcium 11 on 1/21   continue with IVF hydration  advised patient to follow up with PMD as outpatient for repeat BMP and further evaluation of hypercalcemia if it remains an issue.
on lovenox   home omeprazole  BM 1/18  LE duplex neg 1/13     meds verified with patient 1/18  Emailed PCP an update 1/18

## 2024-01-23 NOTE — CHART NOTE - NSCHARTNOTEFT_GEN_A_CORE
Interventional Neuro- Radiology   Procedure Note NP    Procedure: Selective Cerebral Angiography   Pre- Procedure Diagnosis: intracerebral hemorrhage  Post- Procedure Diagnosis: no vascular malformation noted     : Dr. Alan Haney MD  Fellow: Dr. Huber Hodge  NP: Franca Fortune  RN: Kory    Anesthesia: (MAC) Dr. Shaina Campuzano    Sheath: 5 [place]     I/Os:  Estimated blood loss less than 10cc  IV fluids: 100    cc     Urine output  due to void   cc        Contrast 48  cc                 Preliminary Report:  Using a 5 Fr sheath to the right groin under MAC via left common carotid artery, left internal carotid artery, left external carotid artery,  a selective cerebral angiography was performed and  demonstrates no vascular malformation. ( Official note to follow).  Patient tolerated procedure well, hemodynamically stable, no change in neurological status compared to baseline.  Results discussed with neurosurgery, patient and patient's  family.  Groin sheath was removed,  manual compression held to hemostasis  for  21 minutes, no active bleeding, no hematoma, vascade device applied,  quick clot and safeguard balloon dressing applied at 1615h.  Patient transferred to IR Recovery Room

## 2024-01-23 NOTE — PROGRESS NOTE ADULT - SUBJECTIVE AND OBJECTIVE BOX
[facility] Division of Hospital Medicine  Paige Gee MD  Available via MS Teams  Spectra [phone]    SUBJECTIVE / OVERNIGHT EVENTS: patient seen and examined this morning. she is doing relatively well. denies any headache and her shoulder pain resolved after flexeril.     ADDITIONAL REVIEW OF SYSTEMS:    MEDICATIONS  (STANDING):  acetaminophen   IVPB .. 1000 milliGRAM(s) IV Intermittent once  atorvastatin 20 milliGRAM(s) Oral at bedtime  DULoxetine 60 milliGRAM(s) Oral daily  enoxaparin Injectable 40 milliGRAM(s) SubCutaneous <User Schedule>  levothyroxine 50 MICROGram(s) Oral daily  metoprolol tartrate 12.5 milliGRAM(s) Oral every 12 hours  omeprazole 20 milliGRAM(s) Oral daily  sodium chloride 0.9%. 1000 milliLiter(s) (75 mL/Hr) IV Continuous <Continuous>    MEDICATIONS  (PRN):  acetaminophen     Tablet .. 650 milliGRAM(s) Oral every 6 hours PRN Temp greater or equal to 38C (100.4F), Mild Pain (1 - 3)  ALPRAZolam 0.25 milliGRAM(s) Oral every 8 hours PRN anxiety  aluminum hydroxide/magnesium hydroxide/simethicone Suspension 30 milliLiter(s) Oral every 4 hours PRN Dyspepsia  cyclobenzaprine 5 milliGRAM(s) Oral every 12 hours PRN Muscle Spasm  diclofenac sodium 1% Gel 2 Gram(s) Topical two times a day PRN Pain  loperamide 2 milliGRAM(s) Oral daily PRN Diarrhea      I&O's Summary    22 Jan 2024 07:01  -  23 Jan 2024 07:00  --------------------------------------------------------  IN: 1880 mL / OUT: 0 mL / NET: 1880 mL        PHYSICAL EXAM:  Vital Signs Last 24 Hrs  T(C): 36.8 (23 Jan 2024 09:32), Max: 37.1 (23 Jan 2024 00:42)  T(F): 98.3 (23 Jan 2024 09:32), Max: 98.7 (23 Jan 2024 00:42)  HR: 90 (23 Jan 2024 09:32) (81 - 999)  BP: 102/68 (23 Jan 2024 09:32) (102/68 - 130/71)  BP(mean): --  RR: 18 (23 Jan 2024 09:32) (18 - 18)  SpO2: 96% (23 Jan 2024 09:32) (95% - 98%)    Parameters below as of 23 Jan 2024 08:15  Patient On (Oxygen Delivery Method): room air      CONSTITUTIONAL: NAD, well-groomed  NECK: Supple  RESPIRATORY: Normal respiratory effort; lungs are clear to auscultation bilaterally  CARDIOVASCULAR: normal S1 and S2; No lower extremity edema  ABDOMEN: Nontender to palpation, normoactive bowel sounds, no rebound/guarding  MUSCULOSKELETAL: no joint swelling or tenderness to palpation  PSYCH: A+O to person, place, and time; affect appropriate  NEUROLOGY: conversant, moves all extremities      LABS:          PT/INR - ( 22 Jan 2024 23:04 )   PT: 11.6 sec;   INR: 1.11 ratio         PTT - ( 22 Jan 2024 23:04 )  PTT:38.8 sec          COVID-19 PCR: NotDetec (19 Jan 2024 10:22)        RADIOLOGY & ADDITIONAL TESTS:  New Results Reviewed Today:     < from: MR Brain Stereotactic w/wo IV Cont (01.22.24 @ 12:56) >  IMPRESSION: Redemonstration of an evolving late subacute left frontal   lobe parenchymal hematoma with surrounding subarachnoid hemorrhage. Mild   surrounding mass effect and vasogenic edema.    No gross underlying enhancement to suggest the presence of an AVM or mass   lesion.    < end of copied text >    New Imaging Personally Reviewed Today:  New Electrocardiogram Personally Reviewed Today:  Prior or Outpatient Records Reviewed Today:    COMMUNICATION:  Care Discussed with Consultants/Other Providers and Details of Discussion: neurosurgery PA([place])

## 2024-01-23 NOTE — PROGRESS NOTE ADULT - ASSESSMENT
72 yo F with PMH of HTN, HLD, hypothyroidism a/w left IPH and SAH with cerebral edema, transferred from [facility] for further intervention.     PCP: Dr. Casi Fried

## 2024-01-23 NOTE — PROGRESS NOTE ADULT - SUBJECTIVE AND OBJECTIVE BOX
SUBJECTIVE:   L shoulder pain better with 1 dose Flexeril   OVERNIGHT EVENTS: none    Vital Signs Last 24 Hrs  T(C): 36.8 (23 Jan 2024 09:32), Max: 37.1 (23 Jan 2024 00:42)  T(F): 98.3 (23 Jan 2024 09:32), Max: 98.7 (23 Jan 2024 00:42)  HR: 90 (23 Jan 2024 09:32) (81 - 999)  BP: 102/68 (23 Jan 2024 09:32) (102/68 - 130/71)  BP(mean): --  RR: 18 (23 Jan 2024 09:32) (18 - 18)  SpO2: 96% (23 Jan 2024 09:32) (95% - 98%)    Parameters below as of 23 Jan 2024 08:15  Patient On (Oxygen Delivery Method): room air        PHYSICAL EXAM:    Constitutional: No Acute Distress     Neurological: AOx3, Speech clear  Following Commands, Moving all Extremities 5/5       Pulmonary: Clear to Auscultation,     Cardiovascular: S1, S2, +    Gastrointestinal: Soft, Non-tender, Non-distended     Extremities: No calf tenderness       LABS:       PT/INR - ( 22 Jan 2024 23:04 )   PT: 11.6 sec;   INR: 1.11 ratio  )  PTT:38.8 sec          IMAGING:         MEDICATIONS:    acetaminophen     Tablet .. 650 milliGRAM(s) Oral every 6 hours PRN Temp greater or equal to 38C (100.4F), Mild Pain (1 - 3)  acetaminophen   IVPB .. 1000 milliGRAM(s) IV Intermittent once  ALPRAZolam 0.25 milliGRAM(s) Oral every 8 hours PRN anxiety  cyclobenzaprine 5 milliGRAM(s) Oral every 12 hours PRN Muscle Spasm  DULoxetine 60 milliGRAM(s) Oral daily  metoprolol tartrate 12.5 milliGRAM(s) Oral every 12 hours  aluminum hydroxide/magnesium hydroxide/simethicone Suspension 30 milliLiter(s) Oral every 4 hours PRN Dyspepsia  loperamide 2 milliGRAM(s) Oral daily PRN Diarrhea  omeprazole 20 milliGRAM(s) Oral daily  atorvastatin 20 milliGRAM(s) Oral at bedtime  diclofenac sodium 1% Gel 2 Gram(s) Topical two times a day PRN Pain  enoxaparin Injectable 40 milliGRAM(s) SubCutaneous <User Schedule>  levothyroxine 50 MICROGram(s) Oral daily  sodium chloride 0.9%. 1000 milliLiter(s) IV Continuous <Continuous>      DIET:

## 2024-01-23 NOTE — PROGRESS NOTE ADULT - TIME-BASED BILLING (NON-CRITICAL CARE)
Impression: Other secondary cataract, bilateral: H26.493. Plan: Discussed diagnosis in detail with patient. Secondary Cataracts affecting vision some, but no surgery is currently recommended. The patient will monitor vision changes and contact us with any decrease in vision. Continue to monitor.
Impression: Primary open-angle glaucoma, bilateral, moderate stage: O90.9145. Plan: Discussed diagnosis in detail with patient. Discussed treatment options with patient. Reassured patient of current condition and treatment. Will continue to monitor IOP. Continue using current medication(s).
Time-based billing (NON-critical care)
Time-based billing (NON-critical care)

## 2024-01-24 ENCOUNTER — TRANSCRIPTION ENCOUNTER (OUTPATIENT)
Age: 74
End: 2024-01-24

## 2024-01-24 VITALS
OXYGEN SATURATION: 99 % | DIASTOLIC BLOOD PRESSURE: 78 MMHG | SYSTOLIC BLOOD PRESSURE: 120 MMHG | TEMPERATURE: 98 F | HEART RATE: 62 BPM | RESPIRATION RATE: 18 BRPM

## 2024-01-24 PROCEDURE — 80048 BASIC METABOLIC PNL TOTAL CA: CPT

## 2024-01-24 PROCEDURE — 84480 ASSAY TRIIODOTHYRONINE (T3): CPT

## 2024-01-24 PROCEDURE — 97530 THERAPEUTIC ACTIVITIES: CPT

## 2024-01-24 PROCEDURE — 84443 ASSAY THYROID STIM HORMONE: CPT

## 2024-01-24 PROCEDURE — 92523 SPEECH SOUND LANG COMPREHEN: CPT

## 2024-01-24 PROCEDURE — 97161 PT EVAL LOW COMPLEX 20 MIN: CPT

## 2024-01-24 PROCEDURE — 36224 PLACE CATH CAROTD ART: CPT

## 2024-01-24 PROCEDURE — 85610 PROTHROMBIN TIME: CPT

## 2024-01-24 PROCEDURE — 82962 GLUCOSE BLOOD TEST: CPT

## 2024-01-24 PROCEDURE — 70553 MRI BRAIN STEM W/O & W/DYE: CPT

## 2024-01-24 PROCEDURE — 93886 INTRACRANIAL COMPLETE STUDY: CPT

## 2024-01-24 PROCEDURE — 97116 GAIT TRAINING THERAPY: CPT

## 2024-01-24 PROCEDURE — 93306 TTE W/DOPPLER COMPLETE: CPT

## 2024-01-24 PROCEDURE — 85730 THROMBOPLASTIN TIME PARTIAL: CPT

## 2024-01-24 PROCEDURE — 80053 COMPREHEN METABOLIC PANEL: CPT

## 2024-01-24 PROCEDURE — 87635 SARS-COV-2 COVID-19 AMP PRB: CPT

## 2024-01-24 PROCEDURE — 93005 ELECTROCARDIOGRAM TRACING: CPT

## 2024-01-24 PROCEDURE — 83735 ASSAY OF MAGNESIUM: CPT

## 2024-01-24 PROCEDURE — 97535 SELF CARE MNGMENT TRAINING: CPT

## 2024-01-24 PROCEDURE — 92507 TX SP LANG VOICE COMM INDIV: CPT

## 2024-01-24 PROCEDURE — 97110 THERAPEUTIC EXERCISES: CPT

## 2024-01-24 PROCEDURE — 70450 CT HEAD/BRAIN W/O DYE: CPT

## 2024-01-24 PROCEDURE — 83036 HEMOGLOBIN GLYCOSYLATED A1C: CPT

## 2024-01-24 PROCEDURE — 84439 ASSAY OF FREE THYROXINE: CPT

## 2024-01-24 PROCEDURE — 84436 ASSAY OF TOTAL THYROXINE: CPT

## 2024-01-24 PROCEDURE — 93970 EXTREMITY STUDY: CPT

## 2024-01-24 PROCEDURE — 86901 BLOOD TYPING SEROLOGIC RH(D): CPT

## 2024-01-24 PROCEDURE — C1769: CPT

## 2024-01-24 PROCEDURE — 86900 BLOOD TYPING SEROLOGIC ABO: CPT

## 2024-01-24 PROCEDURE — 80061 LIPID PANEL: CPT

## 2024-01-24 PROCEDURE — 84100 ASSAY OF PHOSPHORUS: CPT

## 2024-01-24 PROCEDURE — 36226 PLACE CATH VERTEBRAL ART: CPT

## 2024-01-24 PROCEDURE — 86850 RBC ANTIBODY SCREEN: CPT

## 2024-01-24 PROCEDURE — 97165 OT EVAL LOW COMPLEX 30 MIN: CPT

## 2024-01-24 PROCEDURE — C1760: CPT

## 2024-01-24 PROCEDURE — 85027 COMPLETE CBC AUTOMATED: CPT

## 2024-01-24 PROCEDURE — C1894: CPT

## 2024-01-24 PROCEDURE — 99232 SBSQ HOSP IP/OBS MODERATE 35: CPT

## 2024-01-24 PROCEDURE — A9585: CPT

## 2024-01-24 PROCEDURE — 36227 PLACE CATH XTRNL CAROTID: CPT

## 2024-01-24 PROCEDURE — C1887: CPT

## 2024-01-24 PROCEDURE — 36415 COLL VENOUS BLD VENIPUNCTURE: CPT

## 2024-01-24 RX ORDER — LOPERAMIDE HCL 2 MG
2 TABLET ORAL ONCE
Refills: 0 | Status: COMPLETED | OUTPATIENT
Start: 2024-01-24 | End: 2024-01-24

## 2024-01-24 RX ORDER — LOPERAMIDE HCL 2 MG
1 TABLET ORAL
Qty: 0 | Refills: 0 | DISCHARGE
Start: 2024-01-24

## 2024-01-24 RX ORDER — ACETAMINOPHEN 500 MG
2 TABLET ORAL
Qty: 0 | Refills: 0 | DISCHARGE
Start: 2024-01-24

## 2024-01-24 RX ADMIN — Medication 50 MICROGRAM(S): at 05:31

## 2024-01-24 RX ADMIN — Medication 2 MILLIGRAM(S): at 09:03

## 2024-01-24 NOTE — PROGRESS NOTE ADULT - ASSESSMENT
assessment:    72 yo woman with PMH of HTN, HLD, hypothyroidism, liver cyst s/p resection 2021, had severe sudden headache on 1/6/24  while at  [employer]. Her son took her to to [facility] 1/11/24  CT head Stable appearance of a 2.2 cm focus of intra parenchymal hemorrhage in left frontal lobe with adjacent left frontal lobe subarachnoid hemorrhage . B/L frontoparietal/ para sagittal  SAH. MRI brain no underlying mass, CTA head ? 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 Rpt MR brain 1/22/24  Re demonstration of an evolving late subacute left frontal lobe parenchymal hematoma with surrounding subarachnoid hemorrhage. Mild surrounding mass effect and vasogenic edema.  1/23 repeat angio negative for vasc. malformation.    Plan    neuro:  continue home meds  analgesia  Neuro stable .  off  Nimodipine pbd# 17  Vitals stable -140. Lisinopril held. On Low dose Metoprolol 12.5mg q12   Anxiety- low dose xanax   DVT ppx   1/23 angio negative for vascular malformation.  stable for discharge home to f/u in office in 1-2 weeks    pulm:    stable    cardiac:    stable metoprolol    GI:    gerd omeprazole, reg diet, bowel regimen    renal:    ivl    ID:   afebrile    heme:    stable    endo:    synthroid    dispo:  home now

## 2024-01-24 NOTE — PROVIDER CONTACT NOTE (OTHER) - REASON
pt has taken her own medications from home and is refusing morning medications
BP:  112/79. 12.5 mg of metoprolol due

## 2024-01-24 NOTE — PROVIDER CONTACT NOTE (OTHER) - ACTION/TREATMENT ORDERED:
Notify provider, provider said to hold dose and recheck bp in 1 hr. New Bp: 101/66. HR: 94, so dose held.
Per provider Lamar Braswell hold doses that pt has already "taken" and administer metoprolol as prescribed. Nymalize PO to be reschedule post morning rounds where the team will provide pt teaching.

## 2024-01-24 NOTE — PROVIDER CONTACT NOTE (OTHER) - ASSESSMENT
BP: 112/79.  HR: 90
Pt is refusing morning medications because she already has taken the ones she brought from home. Pt states these medications have been verified by pharmacy however, failed to notified the team she has taken them. Per pt she did not take metoprolol but did take Levothyroxine and pantoprazole. Pt refusing nymalize PO.

## 2024-01-24 NOTE — DISCHARGE NOTE PROVIDER - HOSPITAL COURSE
74 yo woman with PMH of HTN, HLD, hypothyroidism, had severe sudden headache on Saturday while [employer]. Her son took her to to [facility] yesterday and was found to have left IPH and SAH. MRI brain no underlying mass, CTA head ? 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 patient transferred to [facility] for angio.    pt. underwent cerebral angio 1/16 no vasc. malformation noted.    mri 1/22 no vasc. malformation, evolving L frontal IPH w/ IVH.    f/u angio 1/23 also negative.    seen by PT and deemed appropriate for home PT.    f/u office 1-2 weeks.

## 2024-01-24 NOTE — PROGRESS NOTE ADULT - PROVIDER SPECIALTY LIST ADULT
NSICU
NSICU
Neurosurgery
NSICU
NSICU
Neurosurgery
NSICU
Neurosurgery
Hospitalist
NSICU
Internal Medicine

## 2024-01-24 NOTE — DISCHARGE NOTE PROVIDER - CARE PROVIDER_API CALL
Alan Haney  Neurosurgery  [address], [address]  [place] [address]  Phone: [phone]  Fax: [phone]  Follow Up Time:

## 2024-01-24 NOTE — DISCHARGE NOTE PROVIDER - NSDCFUADDINST_GEN_ALL_CORE_FT
please notify physician if fevers, bleeding, swelling, pain not relieved by medication, lethargy, mental status changes, seizure activity, new bowel or bladder dysfunction, difficulty breathing, chest pain, new motor weakness, increased nausea or vomiting, inability to tolerate foods or liquids.     please do not engage in strenuous activity, heavy lifting, drive, or return to work or school until cleared by surgeon.     Shower:  please keep incision clean and dry, do not submerge wound in water for prolonged periods of time, pat dry after showering, and do not use any creams or ointments on incision.

## 2024-01-24 NOTE — DISCHARGE NOTE PROVIDER - NSDCCPCAREPLAN_GEN_ALL_CORE_FT
PRINCIPAL DISCHARGE DIAGNOSIS  Diagnosis: H/O spontaneous subarachnoid intracranial hemorrhage  Assessment and Plan of Treatment:

## 2024-01-24 NOTE — PROVIDER CONTACT NOTE (OTHER) - SITUATION
Pt is refusing morning medications because she already has taken the ones she brought from home. Pt states these medications have been verified by pharmacy however, failed to notified the team.
BP:  112/79. 12.5 mg of metoprolol due

## 2024-01-24 NOTE — DISCHARGE NOTE NURSING/CASE MANAGEMENT/SOCIAL WORK - PATIENT PORTAL LINK FT
You can access the FollowMyHealth Patient Portal offered by [facility] by registering at the following website: [email or web address]. By joining [employer]’s FollowMyHealth portal, you will also be able to view your health information using other applications (apps) compatible with our system.

## 2024-01-24 NOTE — DISCHARGE NOTE NURSING/CASE MANAGEMENT/SOCIAL WORK - NSDCPEFALRISK_GEN_ALL_CORE
For information on Fall & Injury Prevention, visit: [email or web address] OR  [email or web address] OR  https://www.cdc.gov/steadi/patient.html

## 2024-01-24 NOTE — PROGRESS NOTE ADULT - SUBJECTIVE AND OBJECTIVE BOX
SUBJECTIVE:   feels much better this am    OVERNIGHT EVENTS: none    Vital Signs Last 24 Hrs  T(C): 36.6 (24 Jan 2024 05:48), Max: 36.9 (23 Jan 2024 08:15)  T(F): 97.9 (24 Jan 2024 05:48), Max: 98.4 (23 Jan 2024 08:15)  HR: 94 (24 Jan 2024 06:40) (84 - 999)  BP: 101/66 (24 Jan 2024 06:40) (100/61 - 154/85)  BP(mean): 102 (23 Jan 2024 18:25) (88 - 104)  RR: 18 (24 Jan 2024 06:40) (14 - 20)  SpO2: 99% (24 Jan 2024 06:40) (96% - 99%)    Parameters below as of 24 Jan 2024 06:40  Patient On (Oxygen Delivery Method): room air          PHYSICAL EXAM:    Constitutional: No Acute Distress     Neurological: AOx3, Speech clear  Following Commands, Moving all Extremities 5/5, groin stable    Pulmonary: Clear to Auscultation,     Cardiovascular: S1, S2, +    Gastrointestinal: Soft, Non-tender, Non-distended     Extremities: No calf tenderness       LABS:       PT/INR - ( 22 Jan 2024 23:04 )   PT: 11.6 sec;   INR: 1.11 ratio  )  PTT:38.8 sec          IMAGING:         MEDICATIONS:    acetaminophen     Tablet .. 650 milliGRAM(s) Oral every 6 hours PRN Temp greater or equal to 38C (100.4F), Mild Pain (1 - 3)  acetaminophen   IVPB .. 1000 milliGRAM(s) IV Intermittent once  ALPRAZolam 0.25 milliGRAM(s) Oral every 8 hours PRN anxiety  cyclobenzaprine 5 milliGRAM(s) Oral every 12 hours PRN Muscle Spasm  DULoxetine 60 milliGRAM(s) Oral daily  metoprolol tartrate 12.5 milliGRAM(s) Oral every 12 hours  aluminum hydroxide/magnesium hydroxide/simethicone Suspension 30 milliLiter(s) Oral every 4 hours PRN Dyspepsia  loperamide 2 milliGRAM(s) Oral daily PRN Diarrhea  omeprazole 20 milliGRAM(s) Oral daily  atorvastatin 20 milliGRAM(s) Oral at bedtime  diclofenac sodium 1% Gel 2 Gram(s) Topical two times a day PRN Pain  enoxaparin Injectable 40 milliGRAM(s) SubCutaneous <User Schedule>  levothyroxine 50 MICROGram(s) Oral daily  sodium chloride 0.9%. 1000 milliLiter(s) IV Continuous <Continuous>      DIET: reg

## 2024-01-24 NOTE — PROGRESS NOTE ADULT - THIS PATIENT HAS THE FOLLOWING CONDITION(S)/DIAGNOSES ON THIS ADMISSION:
Cerebral Edema
Cerebral Edema
None
Cerebral Edema
None
Cerebral Edema
ICH
None
Cerebral Edema/Brain Compression / Herniation
None
None
Cerebral Edema
Cerebral Edema
SAH
Cerebral Edema
Cerebral Edema

## 2024-01-25 ENCOUNTER — NON-APPOINTMENT (OUTPATIENT)
Age: 74
End: 2024-01-25

## 2024-01-29 RX ORDER — LISINOPRIL 10 MG/1
10 TABLET ORAL
Qty: 90 | Refills: 0 | Status: ACTIVE | COMMUNITY
Start: 2022-10-14 | End: 1900-01-01

## 2024-02-08 ENCOUNTER — APPOINTMENT (OUTPATIENT)
Dept: NEUROSURGERY | Facility: CLINIC | Age: 74
End: 2024-02-08
Payer: MEDICARE

## 2024-02-08 ENCOUNTER — NON-APPOINTMENT (OUTPATIENT)
Age: 74
End: 2024-02-08

## 2024-02-08 VITALS
WEIGHT: 124 LBS | OXYGEN SATURATION: 98 % | HEIGHT: 63 IN | BODY MASS INDEX: 21.97 KG/M2 | HEART RATE: 63 BPM | DIASTOLIC BLOOD PRESSURE: 77 MMHG | SYSTOLIC BLOOD PRESSURE: 148 MMHG

## 2024-02-08 PROCEDURE — 99205 OFFICE O/P NEW HI 60 MIN: CPT

## 2024-02-08 NOTE — PHYSICAL EXAM
[General Appearance - Alert] : alert [General Appearance - In No Acute Distress] : in no acute distress [Person] : oriented to person [Place] : oriented to place [Time] : oriented to time [Cranial Nerves Oculomotor (III)] : extraocular motion intact [Cranial Nerves Trigeminal (V)] : facial sensation intact symmetrically [Cranial Nerves Facial (VII)] : face symmetrical [Cranial Nerves Vestibulocochlear (VIII)] : hearing was intact bilaterally [Cranial Nerves Accessory (XI - Cranial And Spinal)] : head turning and shoulder shrug symmetric [Cranial Nerves Hypoglossal (XII)] : there was no tongue deviation with protrusion [Motor Strength] : muscle strength was normal in all four extremities [Involuntary Movements] : no involuntary movements were seen [] : no respiratory distress [Abnormal Walk] : normal gait

## 2024-02-09 NOTE — REASON FOR VISIT
[Follow-Up: _____] : a [unfilled] follow-up visit [Family Member] : family member [FreeTextEntry1] : Juliane Negrete is here for a hospital follow up visit. She has a past medical history of hypertension and hyperlipidemia who was transferred from [facility] to [facility] due to intracranial hemorrhage.  She  developed spontaneous severe headaches without any other neurological deficit. A noncontrast head CT demonstrated a left frontal hemorrhage. CT angiography as well as MRI with contrast demonstrated a spot sign along the posterior aspect of the hematoma. Cerebral angiography also suggested a region of contrast stagnation corresponding to the spot sign without evidence of overt arteriovenous shunting 1/16/2024. Follow-up cerebral angiography 1/23/2024 showed normal cerebral angiogram. She was discharged home.  Today she presents with chief complaint of sinus headaches, worsening vision, feeling anxious and memory issues.

## 2024-02-09 NOTE — ASSESSMENT
[FreeTextEntry1] : Impression: 73yr old female with left frontal hemorrhage Cerebral angiography 1/16/2024 - suggested a region of contrast stagnation corresponding to the spot sign without evidence of overt arteriovenous shunting   follow-up cerebral angiography 1/23/2024 normal cerebral angiogram Plan:  MRI brain w./wo contrast April then follow up in our office after or ttm  Should this damian brain show cerebral abnormality will proceed with angiography otherwise repeat mri brain in 1year  Refer to ENT to eval for sinus headaches  Refer to opthomology for eye issues  OK to take NSAID

## 2024-02-09 NOTE — REVIEW OF SYSTEMS
[Negative] : Heme/Lymph [Memory Lapses or Loss] : memory loss [Anxiety] : anxiety [Eyesight Problems] : eyesight problems [As Noted in HPI] : as noted in HPI [de-identified] : headaches [FreeTextEntry4] : sinus pain

## 2024-02-12 ENCOUNTER — TRANSCRIPTION ENCOUNTER (OUTPATIENT)
Age: 74
End: 2024-02-12

## 2024-02-12 ENCOUNTER — APPOINTMENT (OUTPATIENT)
Dept: FAMILY MEDICINE | Facility: CLINIC | Age: 74
End: 2024-02-12
Payer: MEDICARE

## 2024-02-12 VITALS
HEIGHT: 63 IN | SYSTOLIC BLOOD PRESSURE: 124 MMHG | HEART RATE: 76 BPM | DIASTOLIC BLOOD PRESSURE: 80 MMHG | RESPIRATION RATE: 12 BRPM | BODY MASS INDEX: 21.26 KG/M2 | WEIGHT: 120 LBS

## 2024-02-12 DIAGNOSIS — M25.512 PAIN IN LEFT SHOULDER: ICD-10-CM

## 2024-02-12 DIAGNOSIS — I10 ESSENTIAL (PRIMARY) HYPERTENSION: ICD-10-CM

## 2024-02-12 DIAGNOSIS — R51.9 HEADACHE, UNSPECIFIED: ICD-10-CM

## 2024-02-12 DIAGNOSIS — J10.1 INFLUENZA DUE TO OTHER IDENTIFIED INFLUENZA VIRUS WITH OTHER RESPIRATORY MANIFESTATIONS: ICD-10-CM

## 2024-02-12 DIAGNOSIS — I61.9 NONTRAUMATIC INTRACEREBRAL HEMORRHAGE, UNSPECIFIED: ICD-10-CM

## 2024-02-12 DIAGNOSIS — J01.90 ACUTE SINUSITIS, UNSPECIFIED: ICD-10-CM

## 2024-02-12 PROCEDURE — G2211 COMPLEX E/M VISIT ADD ON: CPT

## 2024-02-12 PROCEDURE — 99214 OFFICE O/P EST MOD 30 MIN: CPT

## 2024-02-12 RX ORDER — CYCLOBENZAPRINE HYDROCHLORIDE 10 MG/1
10 TABLET, FILM COATED ORAL
Qty: 30 | Refills: 3 | Status: COMPLETED | COMMUNITY
Start: 2020-08-31 | End: 2024-02-12

## 2024-02-12 RX ORDER — BUTALBITAL, ACETAMINOPHEN AND CAFFEINE 325; 50; 40 MG/1; MG/1; MG/1
50-325-40 TABLET ORAL
Qty: 60 | Refills: 0 | Status: ACTIVE | COMMUNITY
Start: 2024-02-12 | End: 1900-01-01

## 2024-02-12 NOTE — REVIEW OF SYSTEMS
[Earache] : earache [Headache] : headache [Dizziness] : dizziness [Confusion] : confusion [Memory Loss] : memory loss [Unsteady Walking] : ataxia [Negative] : Respiratory

## 2024-02-12 NOTE — ASSESSMENT
[FreeTextEntry1] : sp hospitalization for cerebral hemorrhage and aneurysm. she was admitted 1/12/24 to [facility] and transferred to [facility] she had 2 angiograms. It was stable. She was seen for follow up by Dr Haney. she will do a follow up mri in April  She has a headache . he said to follow up with PCP the headache was not related to her aneurysm . she has no sinus congestion but she often gets sinus. she appears to be having a migraine. she has photophobia, phonophobia, no nausea . right cheek and frontal headache. trial butalbital 2 q 8 hr prn headache. Labs drawn/ specimens obtained  in office on this date of service  for evaluation of   assessed conditions -     swab  to be run at [facility].

## 2024-02-12 NOTE — ASSESSMENT
[FreeTextEntry1] : sp hospitalization for cerebral hemorrhage and aneurysm. she was admitted 1/12/24 to [facility] and transferred to [facility] she had 2 angiograms. It was stable. She was seen for follow up by Dr Haney. she will do a follow up mri in April  She has a headache . he said to follow up with PCP the headache was not related to her aneurysm . she has no sinus congestion but she often gets sinus. she appears to be having a migraine. she has photophobia, phonophobia, no nausea . right cheek and frontal headache. trial butalbital 2 q 8 hr prn headache. Labs drawn/ specimens obtained  in office on this date of service  for evaluation of   assessed conditions -     swab  to be run at [facility].

## 2024-02-12 NOTE — HEALTH RISK ASSESSMENT
[Intercurrent hospitalizations] : was admitted to the hospital  [Never] : Never [de-identified] : aneurysm  SAH  [HIV test declined] : HIV test declined [Hepatitis C test declined] : Hepatitis C test declined [Change in mental status noted] : No change in mental status noted [Language] : denies difficulty with language [Behavior] : denies difficulty with behavior [Learning/Retaining New Information] : difficulty learning/retaining new information [Handling Complex Tasks] : difficulty handling complex tasks [Reasoning] : denies difficulty with reasoning [Spatial Ability and Orientation] : difficulty with spatial ability and orientation [Transportation] : transportation [Alone] : lives alone [Retired] : retired [High School] : high school [] :  [Sexually Active] : not sexually active [Feels Safe at Home] : Feels safe at home [Fully functional (bathing, dressing, toileting, transferring, walking, feeding)] : Fully functional (bathing, dressing, toileting, transferring, walking, feeding) [Fully functional (using the telephone, shopping, preparing meals, housekeeping, doing laundry, using] : Fully functional and needs no help or supervision to perform IADLs (using the telephone, shopping, preparing meals, housekeeping, doing laundry, using transportation, managing medications and managing finances) [Reports changes in hearing] : Reports no changes in hearing [Reports changes in vision] : Reports changes in vision [Reports normal functional visual acuity (ie: able to read med bottle)] : Reports normal functional visual acuity [Reports changes in dental health] : Reports no changes in dental health [Smoke Detector] : smoke detector [Carbon Monoxide Detector] : carbon monoxide detector [Guns at Home] : no guns at home [Safety elements used in home] : safety elements used in home [Seat Belt] :  uses seat belt [Sunscreen] : uses sunscreen [Travel to Developing Areas] : does not  travel to developing areas [TB Exposure] : is not being exposed to tuberculosis

## 2024-02-12 NOTE — PHYSICAL EXAM
[Ill-Appearing] : ill-appearing [Normal] : affect was normal and insight and judgment were intact [de-identified] : eyes seem tired   [de-identified] : ear wax left ear. no sinus congestion  nose is clear  [de-identified] : weak legs and right leg pain from bruises from angiogram

## 2024-02-12 NOTE — HISTORY OF PRESENT ILLNESS
[Post-hospitalization from ___ Hospital] : Post-hospitalization from [unfilled] Hospital [Admitted on: ___] : The patient was admitted on [unfilled] [Discharged on ___] : discharged on [unfilled] [Discharge Summary] : discharge summary [Pertinent Labs] : pertinent labs [Radiology Findings] : radiology findings [Discharge Med List] : discharge medication list [Med Reconciliation] : medication reconciliation has been completed [Patient Contacted By: ____] : and contacted by [unfilled] [FreeTextEntry8] : pt c/o sinus pressure +ha +right ear pain x 1 week  [FreeTextEntry2] : as per HIE:  72 yo woman with PMH of HTN, HLD, hypothyroidism, had severe sudden headache on Saturday while [employer]. Her son took her to to [facility] yesterday and was found to have left IPH and SAH. MRI brain no underlying mass, CTA head ? 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 patient transferred to [facility] for angio.   pt. underwent cerebral angio 1/16 no vasc. malformation noted.   mri 1/22 no vasc. malformation, evolving L frontal IPH w/ IVH.   f/u angio 1/23 also negative.   seen by PT and deemed appropriate for home PT.   f/u office 1-2 weeks.   she disagrees with above. she was in [employer] 1/6/24  and turned her head and then in the parking lot she vomited. she thought it was a virus she went home . 1/8/24 her sister took her to [facility] ER  she was there 9 hrs and left. Then on 1/10/ her son called her  and she was not making sense. he came home and took her to   [place] . After the evaluation she went to [facility]. She had 2 angiograms . It was stable   Then she was discharged to home.  her leg is very sore from the angiogram . it was very black and blue  and it is in a lot of pain she followed up with the neurosurgeon Dr Haney for her condition she had a headache and he said it was not the aneurysm . and that  she has a  sinus infection and to follow up with pcp  her forehead right face hurt and her eyes are swollen . she has no congestion no cough  when she lays down the headache is pounding she has photophobia phonophobia, no nausea  she is very weak, wobbly dizzy  she lives alone. she drove here . she is getting therapy.  she is preparing her own meals.

## 2024-02-12 NOTE — HISTORY OF PRESENT ILLNESS
[Post-hospitalization from ___ Hospital] : Post-hospitalization from [unfilled] Hospital [Admitted on: ___] : The patient was admitted on [unfilled] [Discharged on ___] : discharged on [unfilled] [Discharge Summary] : discharge summary [Pertinent Labs] : pertinent labs [Radiology Findings] : radiology findings [Discharge Med List] : discharge medication list [Med Reconciliation] : medication reconciliation has been completed [Patient Contacted By: ____] : and contacted by [unfilled] [FreeTextEntry8] : pt c/o sinus pressure +ha +right ear pain x 1 week  [FreeTextEntry2] : as per HIE:  74 yo woman with PMH of HTN, HLD, hypothyroidism, had severe sudden headache on Saturday while [employer]. Her son took her to to [facility] yesterday and was found to have left IPH and SAH. MRI brain no underlying mass, CTA head ? 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 patient transferred to [facility] for angio.   pt. underwent cerebral angio 1/16 no vasc. malformation noted.   mri 1/22 no vasc. malformation, evolving L frontal IPH w/ IVH.   f/u angio 1/23 also negative.   seen by PT and deemed appropriate for home PT.   f/u office 1-2 weeks.   she disagrees with above. she was in [employer] 1/6/24  and turned her head and then in the parking lot she vomited. she thought it was a virus she went home . 1/8/24 her sister took her to [facility] ER  she was there 9 hrs and left. Then on 1/10/ her son called her  and she was not making sense. he came home and took her to   [place] . After the evaluation she went to [facility]. She had 2 angiograms . It was stable   Then she was discharged to home.  her leg is very sore from the angiogram . it was very black and blue  and it is in a lot of pain she followed up with the neurosurgeon Dr Haney for her condition she had a headache and he said it was not the aneurysm . and that  she has a  sinus infection and to follow up with pcp  her forehead right face hurt and her eyes are swollen . she has no congestion no cough  when she lays down the headache is pounding she has photophobia phonophobia, no nausea  she is very weak, wobbly dizzy  she lives alone. she drove here . she is getting therapy.  she is preparing her own meals.

## 2024-02-12 NOTE — HEALTH RISK ASSESSMENT
[Intercurrent hospitalizations] : was admitted to the hospital  [Never] : Never [de-identified] : aneurysm  SAH  [HIV test declined] : HIV test declined [Hepatitis C test declined] : Hepatitis C test declined [Change in mental status noted] : No change in mental status noted [Language] : denies difficulty with language [Behavior] : denies difficulty with behavior [Learning/Retaining New Information] : difficulty learning/retaining new information [Handling Complex Tasks] : difficulty handling complex tasks [Reasoning] : denies difficulty with reasoning [Spatial Ability and Orientation] : difficulty with spatial ability and orientation [Transportation] : transportation [Alone] : lives alone [Retired] : retired [High School] : high school [] :  [Sexually Active] : not sexually active [Feels Safe at Home] : Feels safe at home [Fully functional (bathing, dressing, toileting, transferring, walking, feeding)] : Fully functional (bathing, dressing, toileting, transferring, walking, feeding) [Fully functional (using the telephone, shopping, preparing meals, housekeeping, doing laundry, using] : Fully functional and needs no help or supervision to perform IADLs (using the telephone, shopping, preparing meals, housekeeping, doing laundry, using transportation, managing medications and managing finances) [Reports changes in hearing] : Reports no changes in hearing [Reports changes in vision] : Reports changes in vision [Reports normal functional visual acuity (ie: able to read med bottle)] : Reports normal functional visual acuity [Reports changes in dental health] : Reports no changes in dental health [Smoke Detector] : smoke detector [Carbon Monoxide Detector] : carbon monoxide detector [Guns at Home] : no guns at home [Safety elements used in home] : safety elements used in home [Seat Belt] :  uses seat belt [Sunscreen] : uses sunscreen [Travel to Developing Areas] : does not  travel to developing areas [TB Exposure] : is not being exposed to tuberculosis

## 2024-02-12 NOTE — PHYSICAL EXAM
[Ill-Appearing] : ill-appearing [Normal] : affect was normal and insight and judgment were intact [de-identified] : eyes seem tired   [de-identified] : ear wax left ear. no sinus congestion  nose is clear  [de-identified] : weak legs and right leg pain from bruises from angiogram

## 2024-02-13 ENCOUNTER — TRANSCRIPTION ENCOUNTER (OUTPATIENT)
Age: 74
End: 2024-02-13

## 2024-02-13 LAB
RAPID RVP RESULT: NOT DETECTED
SARS-COV-2 RNA PNL RESP NAA+PROBE: NOT DETECTED

## 2024-02-14 ENCOUNTER — NON-APPOINTMENT (OUTPATIENT)
Age: 74
End: 2024-02-14

## 2024-02-16 ENCOUNTER — NON-APPOINTMENT (OUTPATIENT)
Age: 74
End: 2024-02-16

## 2024-02-20 ENCOUNTER — TRANSCRIPTION ENCOUNTER (OUTPATIENT)
Age: 74
End: 2024-02-20

## 2024-02-20 ENCOUNTER — NON-APPOINTMENT (OUTPATIENT)
Age: 74
End: 2024-02-20

## 2024-02-27 ENCOUNTER — TRANSCRIPTION ENCOUNTER (OUTPATIENT)
Age: 74
End: 2024-02-27

## 2024-02-27 RX ORDER — ALPRAZOLAM 0.25 MG/1
0.25 TABLET ORAL
Qty: 20 | Refills: 0 | Status: ACTIVE | COMMUNITY
Start: 2023-03-06 | End: 1900-01-01

## 2024-02-27 RX ORDER — DULOXETINE HYDROCHLORIDE 60 MG/1
60 CAPSULE, DELAYED RELEASE PELLETS ORAL
Qty: 90 | Refills: 3 | Status: ACTIVE | COMMUNITY
Start: 2020-05-10 | End: 1900-01-01

## 2024-03-15 NOTE — DISCHARGE NOTE PROVIDER - ATTENDING ATTESTATION STATEMENT
Concerns for low blood pressure and dizziness. Check if taking blood pressure med Coreg (Carvedilol). Measure BP at home and write down BP readings, bring to next visit.    Continue to reduce sugar in diet. Follow up with Dr Alvarado in April.    Schedule with Nephrology (kidney doctor).   I have personally seen and examined the patient. I have collaborated with and supervised the

## 2024-04-05 ENCOUNTER — RX RENEWAL (OUTPATIENT)
Age: 74
End: 2024-04-05

## 2024-04-05 RX ORDER — METOPROLOL SUCCINATE 50 MG/1
50 TABLET, EXTENDED RELEASE ORAL
Qty: 90 | Refills: 0 | Status: ACTIVE | COMMUNITY
Start: 2020-04-24 | End: 1900-01-01

## 2024-04-14 ENCOUNTER — RX RENEWAL (OUTPATIENT)
Age: 74
End: 2024-04-14

## 2024-04-14 RX ORDER — LEVOTHYROXINE SODIUM 0.05 MG/1
50 TABLET ORAL
Qty: 90 | Refills: 0 | Status: ACTIVE | COMMUNITY
Start: 2021-04-20 | End: 1900-01-01

## 2024-05-11 ENCOUNTER — RX RENEWAL (OUTPATIENT)
Age: 74
End: 2024-05-11

## 2024-05-11 RX ORDER — ATORVASTATIN CALCIUM 20 MG/1
20 TABLET, FILM COATED ORAL
Qty: 90 | Refills: 0 | Status: ACTIVE | COMMUNITY
Start: 2020-05-10 | End: 1900-01-01

## 2024-05-18 ENCOUNTER — RX RENEWAL (OUTPATIENT)
Age: 74
End: 2024-05-18

## 2024-05-18 RX ORDER — OMEPRAZOLE 20 MG/1
20 CAPSULE, DELAYED RELEASE ORAL
Qty: 90 | Refills: 0 | Status: ACTIVE | COMMUNITY
Start: 2020-05-10 | End: 1900-01-01

## 2024-07-01 ENCOUNTER — APPOINTMENT (OUTPATIENT)
Dept: MRI IMAGING | Facility: CLINIC | Age: 74
End: 2024-07-01
Payer: MEDICARE

## 2024-07-01 ENCOUNTER — OUTPATIENT (OUTPATIENT)
Dept: OUTPATIENT SERVICES | Facility: HOSPITAL | Age: 74
LOS: 1 days | End: 2024-07-01
Payer: MEDICARE

## 2024-07-01 DIAGNOSIS — I61.9 NONTRAUMATIC INTRACEREBRAL HEMORRHAGE, UNSPECIFIED: ICD-10-CM

## 2024-07-01 PROCEDURE — 70553 MRI BRAIN STEM W/O & W/DYE: CPT | Mod: 26,MH

## 2024-07-01 PROCEDURE — 70553 MRI BRAIN STEM W/O & W/DYE: CPT

## 2024-07-01 PROCEDURE — A9585: CPT

## 2024-07-25 ENCOUNTER — APPOINTMENT (OUTPATIENT)
Dept: NEUROSURGERY | Facility: CLINIC | Age: 74
End: 2024-07-25
Payer: MEDICARE

## 2024-07-25 DIAGNOSIS — I61.9 NONTRAUMATIC INTRACEREBRAL HEMORRHAGE, UNSPECIFIED: ICD-10-CM

## 2024-07-25 DIAGNOSIS — I60.9 NONTRAUMATIC SUBARACHNOID HEMORRHAGE, UNSPECIFIED: ICD-10-CM

## 2024-07-25 PROCEDURE — 99442: CPT | Mod: 93

## 2024-07-25 NOTE — REASON FOR VISIT
[Home] : at home, [unfilled] , at the time of the visit. [Medical Office: ([facility])___] : at the medical office located in  [Verbal consent obtained from patient] : the patient, [unfilled] [Follow-Up: _____] : a [unfilled] follow-up visit [FreeTextEntry1] : Reviewed MRI brain w/wo IV contrast 7/1/2024

## 2024-07-25 NOTE — HISTORY OF PRESENT ILLNESS
[FreeTextEntry1] : CE GARNETT is a 74 year old female with past medical history of hypertension and hyperlipidemia who was transferred from [facility] to [facility] due to intracranial hemorrhage. She developed spontaneous severe headaches without any other neurological deficit. A non-contrast head CT demonstrated a left frontal hemorrhage. CT angiography as well as MRI with contrast demonstrated a spot sign along the posterior aspect of the hematoma. Cerebral angiography also suggested a region of contrast stagnation corresponding to the spot sign without evidence of overt arteriovenous shunting 1/16/2024. Follow-up cerebral angiography 1/23/2024 showed normal cerebral angiogram. She was discharged home.  Today, patient presents for follow up phone call to review results of MRI brain obtained on 7/1/2024. No new complaints.

## 2024-07-25 NOTE — ASSESSMENT
[FreeTextEntry1] : IMPRESSION: 74F with left frontal hemorrhage s/p dx angio 1/16/2024 - suggested a region of contrast stagnation corresponding to the spot sign without evidence of overt arteriovenous shunting. s/p f/u angio 1/23/2024 which was normal.  MRI brain w/wo 7/1/24 shows aging/resolving left frontal parenchymal hemorrhage with resolved mass effect and improving edema. No underlying mass seen.    PLAN: No further neurosurgical follow up or repeat imaging from our end unless new concerns arise

## 2024-07-25 NOTE — HISTORY OF PRESENT ILLNESS
[FreeTextEntry1] : CE GARNETT is a 74 year old female with past medical history of hypertension and hyperlipidemia who was transferred from [facility] to [facility] due to intracranial hemorrhage. She developed spontaneous severe headaches without any other neurological deficit. A non-contrast head CT demonstrated a left frontal hemorrhage. CT angiography as well as MRI with contrast demonstrated a spot sign along the posterior aspect of the hematoma. Cerebral angiography also suggested a region of contrast stagnation corresponding to the spot sign without evidence of overt arteriovenous shunting 1/16/2024. Follow-up cerebral angiography 1/23/2024 showed normal cerebral angiogram. She was discharged home.  Today, patient presents for follow up phone call to review results of MRI brain obtained on 7/1/2024. No new complaints.

## 2024-07-25 NOTE — REASON FOR VISIT
[Home] : at home, [unfilled] , at the time of the visit. [Medical Office: ([facility])___] : at the medical office located in  [Verbal consent obtained from patient] : the patient, [unfilled] [Follow-Up: _____] : a [unfilled] follow-up visit [FreeTextEntry1] : Reviewed MRI brain w/wo IV contrast 7/1/2024

## 2024-07-25 NOTE — REASON FOR VISIT
[Home] : at home, [unfilled] , at the time of the visit. [Medical Office: ([facility])___] : at the medical office located in  [Verbal consent obtained from patient] : the patient, [unfilled] [Follow-Up: _____] : a [unfilled] follow-up visit [FreeTextEntry1] : Reviewed MRI brain w/wo IV contrast 7/1/2024

## 2024-07-25 NOTE — HISTORY OF PRESENT ILLNESS
[FreeTextEntry1] : CE GARNETT is a 74 year old female with past medical history of hypertension and hyperlipidemia who was transferred from [facility] to [facility] due to intracranial hemorrhage. She developed spontaneous severe headaches without any other neurological deficit. A non-contrast head CT demonstrated a left frontal hemorrhage. CT angiography as well as MRI with contrast demonstrated a spot sign along the posterior aspect of the hematoma. Cerebral angiography also suggested a region of contrast stagnation corresponding to the spot sign without evidence of overt arteriovenous shunting 1/16/2024. Follow-up cerebral angiography 1/23/2024 showed normal cerebral angiogram. She was discharged home.  Today, patient presents for follow up phone call to review results of MRI brain obtained on 7/1/2024. No new complaints.

## 2025-02-07 ENCOUNTER — RX RENEWAL (OUTPATIENT)
Age: 75
End: 2025-02-07

## 2025-02-26 ENCOUNTER — RX RENEWAL (OUTPATIENT)
Age: 75
End: 2025-02-26

## 2025-02-27 ENCOUNTER — NON-APPOINTMENT (OUTPATIENT)
Age: 75
End: 2025-02-27

## 2025-03-08 ENCOUNTER — NON-APPOINTMENT (OUTPATIENT)
Age: 75
End: 2025-03-08

## 2025-03-11 ENCOUNTER — APPOINTMENT (OUTPATIENT)
Dept: FAMILY MEDICINE | Facility: CLINIC | Age: 75
End: 2025-03-11
Payer: MEDICARE

## 2025-03-11 VITALS
SYSTOLIC BLOOD PRESSURE: 110 MMHG | DIASTOLIC BLOOD PRESSURE: 68 MMHG | OXYGEN SATURATION: 99 % | HEART RATE: 73 BPM | RESPIRATION RATE: 12 BRPM | WEIGHT: 115 LBS | HEIGHT: 63 IN | BODY MASS INDEX: 20.38 KG/M2

## 2025-03-11 DIAGNOSIS — Z09 ENCOUNTER FOR FOLLOW-UP EXAMINATION AFTER COMPLETED TREATMENT FOR CONDITIONS OTHER THAN MALIGNANT NEOPLASM: ICD-10-CM

## 2025-03-11 DIAGNOSIS — R10.32 LEFT LOWER QUADRANT PAIN: ICD-10-CM

## 2025-03-11 DIAGNOSIS — R19.7 DIARRHEA, UNSPECIFIED: ICD-10-CM

## 2025-03-11 DIAGNOSIS — R79.0 ABNORMAL LVL OF BLOOD MINERAL: ICD-10-CM

## 2025-03-11 DIAGNOSIS — R51.9 HEADACHE, UNSPECIFIED: ICD-10-CM

## 2025-03-11 DIAGNOSIS — Z00.00 ENCOUNTER FOR GENERAL ADULT MEDICAL EXAMINATION W/OUT ABNORMAL FINDINGS: ICD-10-CM

## 2025-03-11 PROCEDURE — 36415 COLL VENOUS BLD VENIPUNCTURE: CPT

## 2025-03-11 PROCEDURE — 99495 TRANSJ CARE MGMT MOD F2F 14D: CPT

## 2025-03-12 LAB
ALBUMIN SERPL ELPH-MCNC: 4.1 G/DL
ALP BLD-CCNC: 94 U/L
ALT SERPL-CCNC: 7 U/L
ANION GAP SERPL CALC-SCNC: 13 MMOL/L
APPEARANCE: CLEAR
AST SERPL-CCNC: 18 U/L
BACTERIA: NEGATIVE /HPF
BILIRUB SERPL-MCNC: 0.4 MG/DL
BILIRUBIN URINE: NEGATIVE
BLOOD URINE: NEGATIVE
BUN SERPL-MCNC: 13 MG/DL
CALCIUM SERPL-MCNC: 8.3 MG/DL
CAST: 0 /LPF
CHLORIDE SERPL-SCNC: 104 MMOL/L
CHOLEST SERPL-MCNC: 168 MG/DL
CO2 SERPL-SCNC: 29 MMOL/L
COLOR: YELLOW
CREAT SERPL-MCNC: 0.82 MG/DL
EGFRCR SERPLBLD CKD-EPI 2021: 75 ML/MIN/1.73M2
EPITHELIAL CELLS: 2 /HPF
FERRITIN SERPL-MCNC: 43 NG/ML
FOLATE SERPL-MCNC: 10.4 NG/ML
GLUCOSE QUALITATIVE U: NEGATIVE MG/DL
GLUCOSE SERPL-MCNC: 97 MG/DL
HDLC SERPL-MCNC: 66 MG/DL
IRON SERPL-MCNC: 43 UG/DL
KETONES URINE: NEGATIVE MG/DL
LDLC SERPL CALC-MCNC: 77 MG/DL
LEUKOCYTE ESTERASE URINE: ABNORMAL
MAGNESIUM SERPL-MCNC: 0.5 MG/DL
MICROSCOPIC-UA: NORMAL
NITRITE URINE: NEGATIVE
NONHDLC SERPL-MCNC: 102 MG/DL
PH URINE: 6
POTASSIUM SERPL-SCNC: 3.9 MMOL/L
PROT SERPL-MCNC: 6.2 G/DL
PROTEIN URINE: NORMAL MG/DL
RED BLOOD CELLS URINE: 2 /HPF
SODIUM SERPL-SCNC: 146 MMOL/L
SPECIFIC GRAVITY URINE: 1.02
TRIGL SERPL-MCNC: 145 MG/DL
TSH SERPL-ACNC: 1.99 UIU/ML
UROBILINOGEN URINE: 0.2 MG/DL
VIT B12 SERPL-MCNC: 440 PG/ML
WHITE BLOOD CELLS URINE: 2 /HPF

## 2025-03-13 DIAGNOSIS — N83.209 UNSPECIFIED OVARIAN CYST, UNSPECIFIED SIDE: ICD-10-CM

## 2025-03-13 LAB — BACTERIA UR CULT: NORMAL

## 2025-03-14 ENCOUNTER — TRANSCRIPTION ENCOUNTER (OUTPATIENT)
Age: 75
End: 2025-03-14

## 2025-03-14 LAB
BASOPHILS # BLD AUTO: 0.07 K/UL
BASOPHILS NFR BLD AUTO: 1 %
EOSINOPHIL # BLD AUTO: 0.1 K/UL
EOSINOPHIL NFR BLD AUTO: 1.4 %
HCT VFR BLD CALC: 44.6 %
HGB BLD-MCNC: 12.7 G/DL
IMM GRANULOCYTES NFR BLD AUTO: 0.4 %
LYMPHOCYTES # BLD AUTO: 2.01 K/UL
LYMPHOCYTES NFR BLD AUTO: 27.8 %
MAN DIFF?: NORMAL
MCHC RBC-ENTMCNC: 28.5 G/DL
MCHC RBC-ENTMCNC: 31 PG
MCV RBC AUTO: 108.8 FL
MONOCYTES # BLD AUTO: 0.5 K/UL
MONOCYTES NFR BLD AUTO: 6.9 %
NEUTROPHILS # BLD AUTO: 4.53 K/UL
NEUTROPHILS NFR BLD AUTO: 62.5 %
PLATELET # BLD AUTO: 282 K/UL
RBC # BLD: 4.1 M/UL
RBC # FLD: 14.1 %
WBC # FLD AUTO: 7.24 K/UL

## 2025-03-19 ENCOUNTER — TRANSCRIPTION ENCOUNTER (OUTPATIENT)
Age: 75
End: 2025-03-19

## 2025-04-03 ENCOUNTER — TRANSCRIPTION ENCOUNTER (OUTPATIENT)
Age: 75
End: 2025-04-03

## 2025-04-23 ENCOUNTER — TRANSCRIPTION ENCOUNTER (OUTPATIENT)
Age: 75
End: 2025-04-23

## 2025-04-24 ENCOUNTER — RX RENEWAL (OUTPATIENT)
Age: 75
End: 2025-04-24

## 2025-05-25 ENCOUNTER — NON-APPOINTMENT (OUTPATIENT)
Age: 75
End: 2025-05-25

## 2025-07-15 ENCOUNTER — RX RENEWAL (OUTPATIENT)
Age: 75
End: 2025-07-15

## 2025-08-18 ENCOUNTER — RX RENEWAL (OUTPATIENT)
Age: 75
End: 2025-08-18

## 2025-09-01 ENCOUNTER — RX RENEWAL (OUTPATIENT)
Age: 75
End: 2025-09-01